# Patient Record
Sex: FEMALE | Race: WHITE | Employment: FULL TIME | URBAN - METROPOLITAN AREA
[De-identification: names, ages, dates, MRNs, and addresses within clinical notes are randomized per-mention and may not be internally consistent; named-entity substitution may affect disease eponyms.]

---

## 2018-04-03 ENCOUNTER — HOSPITAL ENCOUNTER (OUTPATIENT)
Age: 43
Discharge: HOME | End: 2018-04-03
Payer: COMMERCIAL

## 2018-04-03 ENCOUNTER — NURSE ONLY (OUTPATIENT)
Dept: OBGYN | Age: 43
End: 2018-04-03
Payer: COMMERCIAL

## 2018-04-03 VITALS
HEIGHT: 60 IN | WEIGHT: 190 LBS | DIASTOLIC BLOOD PRESSURE: 78 MMHG | HEART RATE: 97 BPM | RESPIRATION RATE: 14 BRPM | SYSTOLIC BLOOD PRESSURE: 110 MMHG | BODY MASS INDEX: 37.3 KG/M2 | TEMPERATURE: 98.8 F

## 2018-04-03 DIAGNOSIS — N93.8 DUB (DYSFUNCTIONAL UTERINE BLEEDING): Primary | ICD-10-CM

## 2018-04-03 DIAGNOSIS — N93.8 DUB (DYSFUNCTIONAL UTERINE BLEEDING): ICD-10-CM

## 2018-04-03 LAB
HCT VFR BLD CALC: 38.8 % (ref 34–48)
HEMOGLOBIN: 12.7 G/DL (ref 11.5–15.5)

## 2018-04-03 PROCEDURE — 36415 COLL VENOUS BLD VENIPUNCTURE: CPT

## 2018-04-03 PROCEDURE — 85014 HEMATOCRIT: CPT

## 2018-04-03 PROCEDURE — 6360000002 HC RX W HCPCS

## 2018-04-03 PROCEDURE — 99211 OFF/OP EST MAY X REQ PHY/QHP: CPT

## 2018-04-03 PROCEDURE — 85018 HEMOGLOBIN: CPT

## 2018-04-03 RX ORDER — MEDROXYPROGESTERONE ACETATE 150 MG/ML
150 INJECTION, SUSPENSION INTRAMUSCULAR
Status: DISCONTINUED | OUTPATIENT
Start: 2018-04-03 | End: 2021-02-11

## 2018-04-03 RX ADMIN — MEDROXYPROGESTERONE ACETATE 150 MG: 150 INJECTION, SUSPENSION INTRAMUSCULAR at 15:55

## 2018-04-11 ENCOUNTER — CLINICAL DOCUMENTATION (OUTPATIENT)
Dept: GENERAL RADIOLOGY | Age: 43
End: 2018-04-11

## 2018-04-18 ENCOUNTER — OFFICE VISIT (OUTPATIENT)
Dept: INTERNAL MEDICINE | Age: 43
End: 2018-04-18
Payer: COMMERCIAL

## 2018-04-18 VITALS
HEART RATE: 99 BPM | RESPIRATION RATE: 18 BRPM | HEIGHT: 60 IN | TEMPERATURE: 99.1 F | DIASTOLIC BLOOD PRESSURE: 78 MMHG | OXYGEN SATURATION: 96 % | SYSTOLIC BLOOD PRESSURE: 118 MMHG | WEIGHT: 190 LBS | BODY MASS INDEX: 37.3 KG/M2

## 2018-04-18 DIAGNOSIS — Z13.1 DIABETES MELLITUS SCREENING: ICD-10-CM

## 2018-04-18 DIAGNOSIS — R20.0 NUMBNESS AND TINGLING: ICD-10-CM

## 2018-04-18 DIAGNOSIS — L20.82 FLEXURAL ECZEMA: ICD-10-CM

## 2018-04-18 DIAGNOSIS — G43.109 MIGRAINE WITH AURA AND WITHOUT STATUS MIGRAINOSUS, NOT INTRACTABLE: Primary | Chronic | ICD-10-CM

## 2018-04-18 DIAGNOSIS — R20.2 NUMBNESS AND TINGLING: ICD-10-CM

## 2018-04-18 DIAGNOSIS — Z88.9 H/O SEASONAL ALLERGIES: ICD-10-CM

## 2018-04-18 PROCEDURE — 1036F TOBACCO NON-USER: CPT | Performed by: INTERNAL MEDICINE

## 2018-04-18 PROCEDURE — G8427 DOCREV CUR MEDS BY ELIG CLIN: HCPCS | Performed by: INTERNAL MEDICINE

## 2018-04-18 PROCEDURE — 99213 OFFICE O/P EST LOW 20 MIN: CPT | Performed by: INTERNAL MEDICINE

## 2018-04-18 PROCEDURE — G8417 CALC BMI ABV UP PARAM F/U: HCPCS | Performed by: INTERNAL MEDICINE

## 2018-04-18 PROCEDURE — 99212 OFFICE O/P EST SF 10 MIN: CPT | Performed by: INTERNAL MEDICINE

## 2018-04-18 RX ORDER — CETIRIZINE HYDROCHLORIDE 10 MG/1
5 TABLET ORAL DAILY
Qty: 30 TABLET | Refills: 5 | Status: SHIPPED | OUTPATIENT
Start: 2018-04-18 | End: 2019-01-25 | Stop reason: SDUPTHER

## 2018-04-18 RX ORDER — AMITRIPTYLINE HYDROCHLORIDE 150 MG/1
TABLET, FILM COATED ORAL
Qty: 30 TABLET | Refills: 5 | Status: SHIPPED | OUTPATIENT
Start: 2018-04-18 | End: 2019-01-25 | Stop reason: SDUPTHER

## 2018-04-18 RX ORDER — SUMATRIPTAN 100 MG/1
TABLET, FILM COATED ORAL
Qty: 9 TABLET | Refills: 5 | Status: SHIPPED | OUTPATIENT
Start: 2018-04-18 | End: 2019-01-25 | Stop reason: SDUPTHER

## 2018-06-07 ENCOUNTER — HOSPITAL ENCOUNTER (OUTPATIENT)
Age: 43
Discharge: HOME | End: 2018-06-07
Payer: COMMERCIAL

## 2018-06-07 ENCOUNTER — HOSPITAL ENCOUNTER (OUTPATIENT)
Dept: MRI IMAGING | Age: 43
Discharge: HOME | End: 2018-06-09
Payer: COMMERCIAL

## 2018-06-07 DIAGNOSIS — Z12.39 BREAST CANCER SCREENING, HIGH RISK PATIENT: ICD-10-CM

## 2018-06-07 DIAGNOSIS — Z91.89 AT HIGH RISK FOR BREAST CANCER: Primary | ICD-10-CM

## 2018-06-07 DIAGNOSIS — R20.0 NUMBNESS AND TINGLING: ICD-10-CM

## 2018-06-07 DIAGNOSIS — Z13.1 DIABETES MELLITUS SCREENING: ICD-10-CM

## 2018-06-07 DIAGNOSIS — R20.2 NUMBNESS AND TINGLING: ICD-10-CM

## 2018-06-07 LAB
FOLATE: >20 NG/ML (ref 4.8–24.2)
HBA1C MFR BLD: 5.4 % (ref 4.8–5.9)
VITAMIN B-12: 479 PG/ML (ref 211–946)

## 2018-06-07 PROCEDURE — 82607 VITAMIN B-12: CPT

## 2018-06-07 PROCEDURE — 82746 ASSAY OF FOLIC ACID SERUM: CPT

## 2018-06-07 PROCEDURE — A9577 INJ MULTIHANCE: HCPCS | Performed by: RADIOLOGY

## 2018-06-07 PROCEDURE — 83036 HEMOGLOBIN GLYCOSYLATED A1C: CPT

## 2018-06-07 PROCEDURE — C8908 MRI W/O FOL W/CONT, BREAST,: HCPCS

## 2018-06-07 PROCEDURE — 6360000004 HC RX CONTRAST MEDICATION: Performed by: RADIOLOGY

## 2018-06-07 PROCEDURE — 36415 COLL VENOUS BLD VENIPUNCTURE: CPT

## 2018-06-07 RX ADMIN — GADOBENATE DIMEGLUMINE 20 ML: 529 INJECTION, SOLUTION INTRAVENOUS at 11:10

## 2018-06-08 ENCOUNTER — TELEPHONE (OUTPATIENT)
Dept: OBGYN | Age: 43
End: 2018-06-08

## 2018-06-14 ENCOUNTER — OFFICE VISIT (OUTPATIENT)
Dept: FAMILY MEDICINE CLINIC | Age: 43
End: 2018-06-14
Payer: COMMERCIAL

## 2018-06-14 VITALS
DIASTOLIC BLOOD PRESSURE: 70 MMHG | HEART RATE: 99 BPM | WEIGHT: 206 LBS | RESPIRATION RATE: 16 BRPM | BODY MASS INDEX: 40.44 KG/M2 | HEIGHT: 60 IN | TEMPERATURE: 98.1 F | SYSTOLIC BLOOD PRESSURE: 104 MMHG | OXYGEN SATURATION: 96 %

## 2018-06-14 DIAGNOSIS — J01.90 ACUTE SINUSITIS, RECURRENCE NOT SPECIFIED, UNSPECIFIED LOCATION: Primary | ICD-10-CM

## 2018-06-14 PROCEDURE — 1036F TOBACCO NON-USER: CPT | Performed by: PHYSICIAN ASSISTANT

## 2018-06-14 PROCEDURE — G8417 CALC BMI ABV UP PARAM F/U: HCPCS | Performed by: PHYSICIAN ASSISTANT

## 2018-06-14 PROCEDURE — 99213 OFFICE O/P EST LOW 20 MIN: CPT | Performed by: PHYSICIAN ASSISTANT

## 2018-06-14 PROCEDURE — G8427 DOCREV CUR MEDS BY ELIG CLIN: HCPCS | Performed by: PHYSICIAN ASSISTANT

## 2018-06-14 RX ORDER — AMOXICILLIN AND CLAVULANATE POTASSIUM 875; 125 MG/1; MG/1
1 TABLET, FILM COATED ORAL 2 TIMES DAILY
Qty: 20 TABLET | Refills: 0 | Status: SHIPPED | OUTPATIENT
Start: 2018-06-14 | End: 2018-06-24

## 2018-06-14 RX ORDER — ONDANSETRON 4 MG/1
4 TABLET, ORALLY DISINTEGRATING ORAL EVERY 8 HOURS PRN
Qty: 9 TABLET | Refills: 0 | Status: SHIPPED | OUTPATIENT
Start: 2018-06-14 | End: 2018-06-17

## 2018-07-03 ENCOUNTER — OFFICE VISIT (OUTPATIENT)
Dept: FAMILY MEDICINE CLINIC | Age: 43
End: 2018-07-03
Payer: COMMERCIAL

## 2018-07-03 VITALS
TEMPERATURE: 99.4 F | HEART RATE: 104 BPM | OXYGEN SATURATION: 98 % | RESPIRATION RATE: 15 BRPM | WEIGHT: 204 LBS | SYSTOLIC BLOOD PRESSURE: 120 MMHG | HEIGHT: 60 IN | DIASTOLIC BLOOD PRESSURE: 82 MMHG | BODY MASS INDEX: 40.05 KG/M2

## 2018-07-03 DIAGNOSIS — Z02.89 ENCOUNTER TO OBTAIN EXCUSE FROM WORK: Primary | ICD-10-CM

## 2018-07-03 DIAGNOSIS — J01.41 ACUTE RECURRENT PANSINUSITIS: ICD-10-CM

## 2018-07-03 PROCEDURE — 99213 OFFICE O/P EST LOW 20 MIN: CPT | Performed by: PHYSICIAN ASSISTANT

## 2018-07-03 PROCEDURE — G8417 CALC BMI ABV UP PARAM F/U: HCPCS | Performed by: PHYSICIAN ASSISTANT

## 2018-07-03 PROCEDURE — 1036F TOBACCO NON-USER: CPT | Performed by: PHYSICIAN ASSISTANT

## 2018-07-03 PROCEDURE — G8428 CUR MEDS NOT DOCUMENT: HCPCS | Performed by: PHYSICIAN ASSISTANT

## 2018-07-03 RX ORDER — AMOXICILLIN AND CLAVULANATE POTASSIUM 875; 125 MG/1; MG/1
1 TABLET, FILM COATED ORAL 2 TIMES DAILY
Qty: 20 TABLET | Refills: 0 | Status: SHIPPED | OUTPATIENT
Start: 2018-07-03 | End: 2018-07-13

## 2018-07-03 RX ORDER — BROMPHENIRAMINE MALEATE, PSEUDOEPHEDRINE HYDROCHLORIDE, AND DEXTROMETHORPHAN HYDROBROMIDE 2; 30; 10 MG/5ML; MG/5ML; MG/5ML
10 SYRUP ORAL 4 TIMES DAILY PRN
Qty: 120 ML | Refills: 0 | COMMUNITY
Start: 2018-07-03 | End: 2021-02-11 | Stop reason: ALTCHOICE

## 2018-08-24 ENCOUNTER — HOSPITAL ENCOUNTER (OUTPATIENT)
Dept: GENERAL RADIOLOGY | Age: 43
Discharge: HOME | End: 2018-08-26
Payer: COMMERCIAL

## 2018-08-24 DIAGNOSIS — Z91.89 AT HIGH RISK FOR BREAST CANCER: ICD-10-CM

## 2018-08-24 PROCEDURE — 77063 BREAST TOMOSYNTHESIS BI: CPT

## 2018-08-25 DIAGNOSIS — Z91.89 AT HIGH RISK FOR BREAST CANCER: Primary | ICD-10-CM

## 2018-09-19 ENCOUNTER — TELEPHONE (OUTPATIENT)
Dept: OBGYN | Age: 43
End: 2018-09-19

## 2018-10-16 ENCOUNTER — TELEPHONE (OUTPATIENT)
Dept: PEDIATRICS | Age: 43
End: 2018-10-16

## 2018-10-16 DIAGNOSIS — L20.82 FLEXURAL ECZEMA: ICD-10-CM

## 2019-01-07 ENCOUNTER — APPOINTMENT (OUTPATIENT)
Dept: CT IMAGING | Age: 44
End: 2019-01-07
Payer: COMMERCIAL

## 2019-01-07 ENCOUNTER — HOSPITAL ENCOUNTER (EMERGENCY)
Age: 44
Discharge: HOME | End: 2019-01-07
Payer: COMMERCIAL

## 2019-01-07 VITALS
HEART RATE: 74 BPM | BODY MASS INDEX: 37.3 KG/M2 | WEIGHT: 190 LBS | SYSTOLIC BLOOD PRESSURE: 102 MMHG | OXYGEN SATURATION: 98 % | HEIGHT: 60 IN | TEMPERATURE: 98.3 F | RESPIRATION RATE: 16 BRPM | DIASTOLIC BLOOD PRESSURE: 68 MMHG

## 2019-01-07 DIAGNOSIS — Z91.89 AT HIGH RISK FOR BREAST CANCER: ICD-10-CM
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
## 2019-01-07 PROCEDURE — 72125 CT NECK SPINE W/O DYE: CPT

## 2019-01-07 PROCEDURE — 70450 CT HEAD/BRAIN W/O DYE: CPT

## 2019-01-07 PROCEDURE — 99284 EMERGENCY DEPT VISIT MOD MDM: CPT

## 2019-01-07 RX ORDER — TRAMADOL HYDROCHLORIDE 50 MG/1
50 TABLET ORAL EVERY 6 HOURS PRN
Qty: 20 TABLET | Refills: 0 | Status: SHIPPED | OUTPATIENT
Start: 2019-01-07 | End: 2019-01-07

## 2019-01-07 RX ORDER — TRAMADOL HYDROCHLORIDE 50 MG/1
50 TABLET ORAL EVERY 6 HOURS PRN
Qty: 20 TABLET | Refills: 0 | Status: SHIPPED | OUTPATIENT
Start: 2019-01-07 | End: 2019-01-12

## 2019-01-07 ASSESSMENT — PAIN DESCRIPTION - PROGRESSION: CLINICAL_PROGRESSION: GRADUALLY WORSENING

## 2019-01-07 ASSESSMENT — PAIN SCALES - GENERAL: PAINLEVEL_OUTOF10: 8

## 2019-01-07 ASSESSMENT — PAIN DESCRIPTION - LOCATION: LOCATION: NECK;SHOULDER

## 2019-01-07 ASSESSMENT — PAIN DESCRIPTION - FREQUENCY: FREQUENCY: CONTINUOUS

## 2019-01-07 ASSESSMENT — PAIN DESCRIPTION - PAIN TYPE: TYPE: ACUTE PAIN

## 2019-01-07 ASSESSMENT — PAIN DESCRIPTION - DESCRIPTORS: DESCRIPTORS: ACHING

## 2019-01-25 ENCOUNTER — OFFICE VISIT (OUTPATIENT)
Dept: INTERNAL MEDICINE | Age: 44
End: 2019-01-25
Payer: COMMERCIAL

## 2019-01-25 VITALS
HEART RATE: 91 BPM | DIASTOLIC BLOOD PRESSURE: 68 MMHG | SYSTOLIC BLOOD PRESSURE: 98 MMHG | WEIGHT: 190 LBS | HEIGHT: 60 IN | BODY MASS INDEX: 37.3 KG/M2 | RESPIRATION RATE: 16 BRPM | TEMPERATURE: 97.7 F

## 2019-01-25 DIAGNOSIS — G43.109 MIGRAINE WITH AURA AND WITHOUT STATUS MIGRAINOSUS, NOT INTRACTABLE: Chronic | ICD-10-CM

## 2019-01-25 DIAGNOSIS — L20.82 FLEXURAL ECZEMA: ICD-10-CM

## 2019-01-25 DIAGNOSIS — Z88.9 H/O SEASONAL ALLERGIES: ICD-10-CM

## 2019-01-25 PROCEDURE — 99212 OFFICE O/P EST SF 10 MIN: CPT | Performed by: INTERNAL MEDICINE

## 2019-01-25 PROCEDURE — 99213 OFFICE O/P EST LOW 20 MIN: CPT | Performed by: INTERNAL MEDICINE

## 2019-01-25 RX ORDER — AMITRIPTYLINE HYDROCHLORIDE 150 MG/1
TABLET, FILM COATED ORAL
Qty: 90 TABLET | Refills: 0 | Status: SHIPPED | OUTPATIENT
Start: 2019-01-25 | End: 2019-05-06 | Stop reason: SDUPTHER

## 2019-01-25 RX ORDER — SUMATRIPTAN 100 MG/1
TABLET, FILM COATED ORAL
Qty: 9 TABLET | Refills: 2 | Status: SHIPPED | OUTPATIENT
Start: 2019-01-25 | End: 2019-01-25 | Stop reason: SDUPTHER

## 2019-01-25 RX ORDER — AMITRIPTYLINE HYDROCHLORIDE 150 MG/1
TABLET, FILM COATED ORAL
Qty: 30 TABLET | Refills: 2 | Status: SHIPPED | OUTPATIENT
Start: 2019-01-25 | End: 2019-01-25 | Stop reason: SDUPTHER

## 2019-01-25 RX ORDER — AMITRIPTYLINE HYDROCHLORIDE 150 MG/1
TABLET, FILM COATED ORAL
Qty: 150 TABLET | Refills: 0 | Status: SHIPPED | OUTPATIENT
Start: 2019-01-25 | End: 2019-05-06 | Stop reason: SDUPTHER

## 2019-01-25 RX ORDER — CETIRIZINE HYDROCHLORIDE 10 MG/1
5 TABLET ORAL DAILY
Qty: 30 TABLET | Refills: 2 | Status: SHIPPED | OUTPATIENT
Start: 2019-01-25 | End: 2019-05-06 | Stop reason: SDUPTHER

## 2019-01-25 RX ORDER — SUMATRIPTAN 100 MG/1
TABLET, FILM COATED ORAL
Qty: 27 TABLET | Refills: 0 | Status: SHIPPED | OUTPATIENT
Start: 2019-01-25 | End: 2019-05-06 | Stop reason: SDUPTHER

## 2019-01-25 ASSESSMENT — PATIENT HEALTH QUESTIONNAIRE - PHQ9
SUM OF ALL RESPONSES TO PHQ QUESTIONS 1-9: 0
1. LITTLE INTEREST OR PLEASURE IN DOING THINGS: 0
SUM OF ALL RESPONSES TO PHQ9 QUESTIONS 1 & 2: 0
SUM OF ALL RESPONSES TO PHQ QUESTIONS 1-9: 0
2. FEELING DOWN, DEPRESSED OR HOPELESS: 0

## 2019-02-07 ENCOUNTER — TELEPHONE (OUTPATIENT)
Dept: INTERNAL MEDICINE | Age: 44
End: 2019-02-07

## 2019-04-16 ENCOUNTER — APPOINTMENT (OUTPATIENT)
Dept: CT IMAGING | Age: 44
End: 2019-04-16

## 2019-04-16 ENCOUNTER — HOSPITAL ENCOUNTER (EMERGENCY)
Age: 44
Discharge: HOME | End: 2019-04-16
Attending: EMERGENCY MEDICINE

## 2019-04-16 VITALS
HEART RATE: 77 BPM | OXYGEN SATURATION: 99 % | RESPIRATION RATE: 14 BRPM | HEIGHT: 60 IN | WEIGHT: 190 LBS | DIASTOLIC BLOOD PRESSURE: 73 MMHG | SYSTOLIC BLOOD PRESSURE: 116 MMHG | TEMPERATURE: 99 F | BODY MASS INDEX: 37.3 KG/M2

## 2019-04-16 DIAGNOSIS — K59.00 CONSTIPATION, UNSPECIFIED CONSTIPATION TYPE: ICD-10-CM

## 2019-04-16 DIAGNOSIS — K56.41 FECAL IMPACTION IN RECTUM (HCC): Primary | ICD-10-CM

## 2019-04-16 LAB
ALBUMIN SERPL-MCNC: 4.2 G/DL (ref 3.5–5.2)
ALP BLD-CCNC: 74 U/L (ref 35–104)
ALT SERPL-CCNC: 22 U/L (ref 0–32)
ANION GAP SERPL CALCULATED.3IONS-SCNC: 11 MMOL/L (ref 7–16)
AST SERPL-CCNC: 46 U/L (ref 0–31)
BASOPHILS ABSOLUTE: 0.04 E9/L (ref 0–0.2)
BASOPHILS RELATIVE PERCENT: 0.4 % (ref 0–2)
BILIRUB SERPL-MCNC: 0.4 MG/DL (ref 0–1.2)
BUN BLDV-MCNC: 6 MG/DL (ref 6–20)
CALCIUM SERPL-MCNC: 9 MG/DL (ref 8.6–10.2)
CHLORIDE BLD-SCNC: 105 MMOL/L (ref 98–107)
CO2: 24 MMOL/L (ref 22–29)
CREAT SERPL-MCNC: 0.7 MG/DL (ref 0.5–1)
EOSINOPHILS ABSOLUTE: 0.09 E9/L (ref 0.05–0.5)
EOSINOPHILS RELATIVE PERCENT: 0.9 % (ref 0–6)
GFR AFRICAN AMERICAN: >60
GFR NON-AFRICAN AMERICAN: >60 ML/MIN/1.73
GLUCOSE BLD-MCNC: 101 MG/DL (ref 74–99)
HCG QUALITATIVE: NEGATIVE
HCT VFR BLD CALC: 41.4 % (ref 34–48)
HEMOGLOBIN: 13.6 G/DL (ref 11.5–15.5)
IMMATURE GRANULOCYTES #: 0.05 E9/L
IMMATURE GRANULOCYTES %: 0.5 % (ref 0–5)
LACTIC ACID, SEPSIS: 1.5 MMOL/L (ref 0.5–1.9)
LYMPHOCYTES ABSOLUTE: 1.14 E9/L (ref 1.5–4)
LYMPHOCYTES RELATIVE PERCENT: 11.3 % (ref 20–42)
MCH RBC QN AUTO: 28 PG (ref 26–35)
MCHC RBC AUTO-ENTMCNC: 32.9 % (ref 32–34.5)
MCV RBC AUTO: 85.4 FL (ref 80–99.9)
MONOCYTES ABSOLUTE: 0.64 E9/L (ref 0.1–0.95)
MONOCYTES RELATIVE PERCENT: 6.3 % (ref 2–12)
NEUTROPHILS ABSOLUTE: 8.12 E9/L (ref 1.8–7.3)
NEUTROPHILS RELATIVE PERCENT: 80.6 % (ref 43–80)
PDW BLD-RTO: 14.4 FL (ref 11.5–15)
PLATELET # BLD: 360 E9/L (ref 130–450)
PMV BLD AUTO: 10.8 FL (ref 7–12)
POTASSIUM SERPL-SCNC: 4.5 MMOL/L (ref 3.5–5)
RBC # BLD: 4.85 E12/L (ref 3.5–5.5)
SODIUM BLD-SCNC: 140 MMOL/L (ref 132–146)
TOTAL PROTEIN: 7.8 G/DL (ref 6.4–8.3)
WBC # BLD: 10.1 E9/L (ref 4.5–11.5)

## 2019-04-16 PROCEDURE — 99283 EMERGENCY DEPT VISIT LOW MDM: CPT

## 2019-04-16 PROCEDURE — 80053 COMPREHEN METABOLIC PANEL: CPT

## 2019-04-16 PROCEDURE — 6360000002 HC RX W HCPCS: Performed by: EMERGENCY MEDICINE

## 2019-04-16 PROCEDURE — 96375 TX/PRO/DX INJ NEW DRUG ADDON: CPT

## 2019-04-16 PROCEDURE — 85025 COMPLETE CBC W/AUTO DIFF WBC: CPT

## 2019-04-16 PROCEDURE — 96374 THER/PROPH/DIAG INJ IV PUSH: CPT

## 2019-04-16 PROCEDURE — 2580000003 HC RX 258: Performed by: EMERGENCY MEDICINE

## 2019-04-16 PROCEDURE — 83605 ASSAY OF LACTIC ACID: CPT

## 2019-04-16 PROCEDURE — 36415 COLL VENOUS BLD VENIPUNCTURE: CPT

## 2019-04-16 PROCEDURE — 74176 CT ABD & PELVIS W/O CONTRAST: CPT

## 2019-04-16 PROCEDURE — 84703 CHORIONIC GONADOTROPIN ASSAY: CPT

## 2019-04-16 RX ORDER — METRONIDAZOLE 500 MG/1
500 TABLET ORAL 3 TIMES DAILY
Qty: 30 TABLET | Refills: 0 | Status: SHIPPED | OUTPATIENT
Start: 2019-04-16 | End: 2019-04-26

## 2019-04-16 RX ORDER — CIPROFLOXACIN 500 MG/1
500 TABLET, FILM COATED ORAL 2 TIMES DAILY
Qty: 20 TABLET | Refills: 0 | Status: SHIPPED | OUTPATIENT
Start: 2019-04-16 | End: 2019-04-26

## 2019-04-16 RX ORDER — 0.9 % SODIUM CHLORIDE 0.9 %
1000 INTRAVENOUS SOLUTION INTRAVENOUS ONCE
Status: COMPLETED | OUTPATIENT
Start: 2019-04-16 | End: 2019-04-16

## 2019-04-16 RX ORDER — MORPHINE SULFATE 4 MG/ML
6 INJECTION, SOLUTION INTRAMUSCULAR; INTRAVENOUS ONCE
Status: COMPLETED | OUTPATIENT
Start: 2019-04-16 | End: 2019-04-16

## 2019-04-16 RX ORDER — ONDANSETRON 2 MG/ML
4 INJECTION INTRAMUSCULAR; INTRAVENOUS ONCE
Status: COMPLETED | OUTPATIENT
Start: 2019-04-16 | End: 2019-04-16

## 2019-04-16 RX ADMIN — SODIUM CHLORIDE 1000 ML: 9 INJECTION, SOLUTION INTRAVENOUS at 08:51

## 2019-04-16 RX ADMIN — MORPHINE SULFATE 6 MG: 4 INJECTION INTRAVENOUS at 08:51

## 2019-04-16 RX ADMIN — ONDANSETRON 4 MG: 2 INJECTION INTRAMUSCULAR; INTRAVENOUS at 08:51

## 2019-04-16 ASSESSMENT — PAIN DESCRIPTION - DESCRIPTORS: DESCRIPTORS: ACHING

## 2019-04-16 ASSESSMENT — PAIN DESCRIPTION - LOCATION: LOCATION: ABDOMEN

## 2019-04-16 ASSESSMENT — PAIN DESCRIPTION - FREQUENCY: FREQUENCY: CONTINUOUS

## 2019-04-16 ASSESSMENT — PAIN DESCRIPTION - PAIN TYPE: TYPE: ACUTE PAIN

## 2019-04-16 ASSESSMENT — PAIN SCALES - GENERAL
PAINLEVEL_OUTOF10: 9
PAINLEVEL_OUTOF10: 9

## 2019-04-16 ASSESSMENT — PAIN DESCRIPTION - PROGRESSION: CLINICAL_PROGRESSION: GRADUALLY WORSENING

## 2019-04-16 NOTE — LETTER
[address] [facility] Emergency Department  [facility] [place] [address]  Phone: [phone]               April 16, 2019    Patient: Matthew Francisco   YOB: 1975   Date of Visit: 4/16/2019       To Whom It May Concern:    Santi Altamirano was seen and treated in our emergency department on 4/16/2019. She may return to work on 4/18/2019.       Sincerely,       Louann Hsieh RN         Signature:__________________________________

## 2019-04-16 NOTE — ED PROVIDER NOTES
and sister. The patients home medications have been reviewed. Allergies: Latex; Iodine; Trazodone and nefazodone; Bactrim; Iodides; Aspirin; Tylenol with codeine [acetaminophen-codeine]; Ultram [tramadol hcl]; and Vicodin tuss [hydrocodone-guaifenesin]        ---------------------------------------------------PHYSICAL EXAM--------------------------------------    Constitutional/General: Alert and oriented x3  Head: Normocephalic and atraumatic  Eyes: PERRL, EOMI, conjunctiva normal, sclera non icteric  Mouth: Oropharynx clear, handling secretions, no trismus, no asymmetry of the posterior oropharynx or uvular edema  Neck: Supple, full ROM, no stridor, no meningeal signs  Respiratory: Lungs clear to auscultation bilaterally, no wheezes, rales, or rhonchi. Not in respiratory distress  Cardiovascular:  Regular rate. Regular rhythm. No murmurs, no aortic murmurs, no gallops, or rubs. 2+ distal pulses. Equal extremity pulses. Chest: No chest wall tenderness  GI:  Abdomen Soft, Non tender, Non distended. +BS. No rebound, guarding, or rigidity. No pulsatile masses. Musculoskeletal: Moves all extremities x 4. Warm and well perfused, no clubbing, cyanosis, or edema. Capillary refill <3 seconds  Integument: skin warm and dry. No rashes. Neurologic: GCS 15, no focal deficits, symmetric strength 5/5 in the upper and lower extremities bilaterally  Psychiatric: Normal Affect  Rectal: Female chaperone present Bryan Osborn RN, external exam shows no external lesions or skin tears or anal fissures. No external hemorrhoids. Digital exam has mild tenderness and positive stool in the rectal vault as well as what feels like an internal hemorrhoid at 9:00 position. + fecal impaction.  Stool is brown and is trace guaiac positive but there is no gross blood or grossly bloody stool.        -------------------------------------------------- RESULTS -------------------------------------------------  I have personally reviewed retention and distention involving the   distal sigmoid/proximal rectum. Area measures approximately 8.0 cm   anterior posterior by 7.7 cm transverse by approximately 11 cm   craniocaudal with stranding noted. Findings are concerning for fecal   impaction and the possibility of developing stercoral colitis should   be considered. 2. Mild/moderate cardiomegaly with a marked to large sized hiatal   hernia. 3. Mass effect and compression upon the uterus secondary to the fecal   retention/impaction. Bladder is prominently distended. ------------------------- NURSING NOTES AND VITALS REVIEWED ---------------------------   The nursing notes within the ED encounter and vital signs as below have been reviewed by myself. /73   Pulse 77   Temp 99 °F (37.2 °C) (Oral)   Resp 14   Ht 5' (1.524 m)   Wt 190 lb (86.2 kg)   LMP 03/30/2019   SpO2 99%   Breastfeeding? No   BMI 37.11 kg/m²   Oxygen Saturation Interpretation: Normal    The patients available past medical records and past encounters were reviewed. ------------------------------ ED COURSE/MEDICAL DECISION MAKING----------------------  Medications   0.9 % sodium chloride bolus (0 mLs Intravenous Stopped 4/16/19 0900)   morphine (PF) injection 6 mg (6 mg Intravenous Given 4/16/19 0851)   ondansetron (ZOFRAN) injection 4 mg (4 mg Intravenous Given 4/16/19 0851)              Medical Decision Making:    Fecal impaction. Enema, antibiotics, home. No further bleeding      Re-Evaluations:                Improving  She is feeling better      This patient's ED course included: a personal history and physicial examination, re-evaluation prior to disposition, IV medications and complex medical decision making and emergency management    This patient has remained hemodynamically stable during their ED course. Counseling:    The emergency provider has spoken with the patient and discussed todays results, in addition to providing specific details for the plan of care and counseling regarding the diagnosis and prognosis. Questions are answered at this time and they are agreeable with the plan.       --------------------------------- IMPRESSION AND DISPOSITION ---------------------------------    IMPRESSION  1. Fecal impaction in rectum ([facility]    2. Constipation, unspecified constipation type        DISPOSITION  Disposition: Discharge to home  Patient condition is good        NOTE: This report was transcribed using voice recognition software.  Every effort was made to ensure accuracy; however, inadvertent computerized transcription errors may be present        Guillermina Rodney MD  04/16/19 8562

## 2019-04-28 ENCOUNTER — HOSPITAL ENCOUNTER (EMERGENCY)
Age: 44
Discharge: LEFT BEFORE DISCHARGE | End: 2019-04-28
Attending: EMERGENCY MEDICINE

## 2019-04-28 ENCOUNTER — APPOINTMENT (OUTPATIENT)
Dept: CT IMAGING | Age: 44
End: 2019-04-28

## 2019-04-28 ENCOUNTER — APPOINTMENT (OUTPATIENT)
Dept: GENERAL RADIOLOGY | Age: 44
End: 2019-04-28

## 2019-04-28 ENCOUNTER — HOSPITAL ENCOUNTER (EMERGENCY)
Age: 44
Discharge: HOME | End: 2019-04-28
Attending: EMERGENCY MEDICINE

## 2019-04-28 VITALS
HEART RATE: 99 BPM | SYSTOLIC BLOOD PRESSURE: 116 MMHG | HEIGHT: 60 IN | OXYGEN SATURATION: 98 % | DIASTOLIC BLOOD PRESSURE: 81 MMHG | RESPIRATION RATE: 14 BRPM | TEMPERATURE: 97.3 F | BODY MASS INDEX: 39.27 KG/M2 | WEIGHT: 200 LBS

## 2019-04-28 VITALS
SYSTOLIC BLOOD PRESSURE: 124 MMHG | WEIGHT: 200 LBS | HEIGHT: 60 IN | RESPIRATION RATE: 16 BRPM | OXYGEN SATURATION: 98 % | HEART RATE: 99 BPM | BODY MASS INDEX: 39.27 KG/M2 | TEMPERATURE: 98.2 F | DIASTOLIC BLOOD PRESSURE: 82 MMHG

## 2019-04-28 DIAGNOSIS — K62.89 PROCTITIS: ICD-10-CM

## 2019-04-28 DIAGNOSIS — R33.9 URINARY RETENTION: ICD-10-CM

## 2019-04-28 DIAGNOSIS — K59.00 CONSTIPATION, UNSPECIFIED CONSTIPATION TYPE: Primary | ICD-10-CM

## 2019-04-28 DIAGNOSIS — K56.41 FECAL IMPACTION (HCC): Primary | ICD-10-CM

## 2019-04-28 DIAGNOSIS — K59.00 CONSTIPATION, UNSPECIFIED CONSTIPATION TYPE: ICD-10-CM

## 2019-04-28 DIAGNOSIS — R10.84 GENERALIZED ABDOMINAL PAIN: ICD-10-CM

## 2019-04-28 LAB
ALBUMIN SERPL-MCNC: 4.5 G/DL (ref 3.5–5.2)
ALP BLD-CCNC: 88 U/L (ref 35–104)
ALT SERPL-CCNC: 20 U/L (ref 0–32)
ANION GAP SERPL CALCULATED.3IONS-SCNC: 12 MMOL/L (ref 7–16)
AST SERPL-CCNC: 20 U/L (ref 0–31)
BACTERIA: ABNORMAL /HPF
BASOPHILS ABSOLUTE: 0.05 E9/L (ref 0–0.2)
BASOPHILS RELATIVE PERCENT: 0.4 % (ref 0–2)
BILIRUB SERPL-MCNC: 1.7 MG/DL (ref 0–1.2)
BILIRUBIN URINE: NEGATIVE
BLOOD, URINE: NEGATIVE
BUN BLDV-MCNC: 5 MG/DL (ref 6–20)
CALCIUM SERPL-MCNC: 9.4 MG/DL (ref 8.6–10.2)
CHLORIDE BLD-SCNC: 105 MMOL/L (ref 98–107)
CLARITY: CLEAR
CO2: 26 MMOL/L (ref 22–29)
COLOR: YELLOW
CREAT SERPL-MCNC: 0.8 MG/DL (ref 0.5–1)
EOSINOPHILS ABSOLUTE: 0.08 E9/L (ref 0.05–0.5)
EOSINOPHILS RELATIVE PERCENT: 0.6 % (ref 0–6)
GFR AFRICAN AMERICAN: >60
GFR NON-AFRICAN AMERICAN: >60 ML/MIN/1.73
GLUCOSE BLD-MCNC: 104 MG/DL (ref 74–99)
GLUCOSE URINE: NEGATIVE MG/DL
HCT VFR BLD CALC: 42.5 % (ref 34–48)
HEMOGLOBIN: 13.8 G/DL (ref 11.5–15.5)
IMMATURE GRANULOCYTES #: 0.05 E9/L
IMMATURE GRANULOCYTES %: 0.4 % (ref 0–5)
KETONES, URINE: NEGATIVE MG/DL
LACTIC ACID: 1.2 MMOL/L (ref 0.5–2.2)
LEUKOCYTE ESTERASE, URINE: ABNORMAL
LIPASE: 22 U/L (ref 13–60)
LYMPHOCYTES ABSOLUTE: 1.91 E9/L (ref 1.5–4)
LYMPHOCYTES RELATIVE PERCENT: 15 % (ref 20–42)
MCH RBC QN AUTO: 28.2 PG (ref 26–35)
MCHC RBC AUTO-ENTMCNC: 32.5 % (ref 32–34.5)
MCV RBC AUTO: 86.7 FL (ref 80–99.9)
MONOCYTES ABSOLUTE: 0.85 E9/L (ref 0.1–0.95)
MONOCYTES RELATIVE PERCENT: 6.7 % (ref 2–12)
NEUTROPHILS ABSOLUTE: 9.78 E9/L (ref 1.8–7.3)
NEUTROPHILS RELATIVE PERCENT: 76.9 % (ref 43–80)
NITRITE, URINE: NEGATIVE
PDW BLD-RTO: 14.2 FL (ref 11.5–15)
PH UA: 5 (ref 5–9)
PLATELET # BLD: 356 E9/L (ref 130–450)
PMV BLD AUTO: 10.4 FL (ref 7–12)
POTASSIUM SERPL-SCNC: 3.9 MMOL/L (ref 3.5–5)
PROTEIN UA: NEGATIVE MG/DL
RBC # BLD: 4.9 E12/L (ref 3.5–5.5)
RBC UA: ABNORMAL /HPF (ref 0–2)
SODIUM BLD-SCNC: 143 MMOL/L (ref 132–146)
SPECIFIC GRAVITY UA: 1.01 (ref 1–1.03)
TOTAL PROTEIN: 8 G/DL (ref 6.4–8.3)
UROBILINOGEN, URINE: 0.2 E.U./DL
WBC # BLD: 12.7 E9/L (ref 4.5–11.5)
WBC UA: ABNORMAL /HPF (ref 0–5)

## 2019-04-28 PROCEDURE — 85025 COMPLETE CBC W/AUTO DIFF WBC: CPT

## 2019-04-28 PROCEDURE — 6360000002 HC RX W HCPCS: Performed by: NURSE PRACTITIONER

## 2019-04-28 PROCEDURE — 74018 RADEX ABDOMEN 1 VIEW: CPT

## 2019-04-28 PROCEDURE — 83690 ASSAY OF LIPASE: CPT

## 2019-04-28 PROCEDURE — 2580000003 HC RX 258: Performed by: NURSE PRACTITIONER

## 2019-04-28 PROCEDURE — 36415 COLL VENOUS BLD VENIPUNCTURE: CPT

## 2019-04-28 PROCEDURE — 80053 COMPREHEN METABOLIC PANEL: CPT

## 2019-04-28 PROCEDURE — 74176 CT ABD & PELVIS W/O CONTRAST: CPT

## 2019-04-28 PROCEDURE — 99284 EMERGENCY DEPT VISIT MOD MDM: CPT

## 2019-04-28 PROCEDURE — 83605 ASSAY OF LACTIC ACID: CPT

## 2019-04-28 PROCEDURE — 96374 THER/PROPH/DIAG INJ IV PUSH: CPT

## 2019-04-28 PROCEDURE — 81001 URINALYSIS AUTO W/SCOPE: CPT

## 2019-04-28 PROCEDURE — 99283 EMERGENCY DEPT VISIT LOW MDM: CPT

## 2019-04-28 PROCEDURE — 6360000004 HC RX CONTRAST MEDICATION: Performed by: RADIOLOGY

## 2019-04-28 RX ORDER — MAGNESIUM CITRATE
150 SOLUTION, ORAL ORAL ONCE
Qty: 150 ML | Refills: 0 | Status: SHIPPED | OUTPATIENT
Start: 2019-04-28 | End: 2019-04-28

## 2019-04-28 RX ORDER — 0.9 % SODIUM CHLORIDE 0.9 %
1000 INTRAVENOUS SOLUTION INTRAVENOUS ONCE
Status: COMPLETED | OUTPATIENT
Start: 2019-04-28 | End: 2019-04-28

## 2019-04-28 RX ORDER — PROCHLORPERAZINE EDISYLATE 5 MG/ML
10 INJECTION INTRAMUSCULAR; INTRAVENOUS ONCE
Status: COMPLETED | OUTPATIENT
Start: 2019-04-28 | End: 2019-04-28

## 2019-04-28 RX ADMIN — PROCHLORPERAZINE EDISYLATE 10 MG: 5 INJECTION INTRAMUSCULAR; INTRAVENOUS at 15:20

## 2019-04-28 RX ADMIN — SODIUM CHLORIDE 1000 ML: 9 INJECTION, SOLUTION INTRAVENOUS at 15:20

## 2019-04-28 RX ADMIN — IOHEXOL 50 ML: 240 INJECTION, SOLUTION INTRATHECAL; INTRAVASCULAR; INTRAVENOUS; ORAL at 15:20

## 2019-04-28 ASSESSMENT — ENCOUNTER SYMPTOMS
CONSTIPATION: 1
COUGH: 0
SHORTNESS OF BREATH: 0
BLOOD IN STOOL: 0
ABDOMINAL PAIN: 1
DIARRHEA: 0
VOMITING: 1
NAUSEA: 1
HEMATOCHEZIA: 0
BACK PAIN: 0
WHEEZING: 0

## 2019-04-28 ASSESSMENT — PAIN DESCRIPTION - ORIENTATION: ORIENTATION: MID;LOWER;UPPER

## 2019-04-28 ASSESSMENT — PAIN DESCRIPTION - LOCATION
LOCATION: ABDOMEN
LOCATION: ABDOMEN

## 2019-04-28 ASSESSMENT — PAIN DESCRIPTION - DESCRIPTORS: DESCRIPTORS: ACHING

## 2019-04-28 ASSESSMENT — PAIN DESCRIPTION - FREQUENCY: FREQUENCY: CONTINUOUS

## 2019-04-28 ASSESSMENT — PAIN DESCRIPTION - PAIN TYPE: TYPE: ACUTE PAIN;CHRONIC PAIN

## 2019-04-28 ASSESSMENT — PAIN DESCRIPTION - PROGRESSION: CLINICAL_PROGRESSION: NOT CHANGED

## 2019-04-28 ASSESSMENT — PAIN SCALES - GENERAL
PAINLEVEL_OUTOF10: 9
PAINLEVEL_OUTOF10: 9

## 2019-04-28 NOTE — ED NOTES
Pt requested soap suds  States \"It works better for [facility]".   States she wants to do it herself     Elisha Aguilera, RN  04/28/19 7760

## 2019-04-28 NOTE — ED NOTES
Latex free urinary catheter obtained from central supply. Went into pts room. Pt was not in room. Clothes were not in room. Gown on bed. Pt not in restrooms at this time. NP notified.       Osmany Little RN  04/28/19 1262
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
## 2019-04-28 NOTE — ED NOTES
Call placed at this time to central supply for a latex free urinary catheter     Chen Simon RN  04/28/19 2622

## 2019-04-28 NOTE — ED NOTES
Radiology Procedure Waiver   Name: Alicia Decker  : 1975  MRN: [id number]    Date:  19    Time: 4:17 PM    Benefits of immediately proceeding with Radiology exam(s) without pre-testing outweigh the risks or are not indicated as specified below and therefore the following is/are being waived:    [x] Pregnancy test   [x] Patient had a negative POC pregnancy on 19   [] Patient had Tubal Ligation or has other Contraception Device. [] Patient  is Menopausal or Premenarcheal.    [] Patient had Full or Partial Hysterectomy. [] Protocol for Iodine allergy    [] MRI Questionnaire     [] BUN/Creatinine   [] Patient age w/no hx of renal dysfunction. [] Patient on Dialysis. [] Recent Normal Labs.   Electronically signed by NANCY Fung CNP on 19 at 4:17 PM               NANCY Fung CNP  [phone]

## 2019-04-28 NOTE — ED NOTES
Pt states she feels a little better.   Requests to be discharged    rx x 1  To follow with pmnory/ELIANE Romo RN  04/28/19 0436

## 2019-04-28 NOTE — ED NOTES
20g IV found on top of garbage in garbage can in pts room.       Virginie Russo, DULCE  04/28/19 1083

## 2019-04-28 NOTE — ED PROVIDER NOTES
atraumatic. Eyes: Pupils are equal, round, and reactive to light. Neck: Normal range of motion. Neck supple. Cardiovascular: Normal rate, regular rhythm, normal heart sounds and intact distal pulses. No murmur heard. Pulmonary/Chest: Effort normal and breath sounds normal. No respiratory distress. She has no wheezes. She has no rales. Abdominal: Soft. Bowel sounds are normal. She exhibits distension. There is no tenderness. There is no rebound and no guarding. Musculoskeletal: She exhibits no edema. Neurological: She is alert. Skin: Skin is warm and dry. Capillary refill takes less than 2 seconds. She is not diaphoretic. Nursing note and vitals reviewed. Procedures  --------------------------------------------- PAST HISTORY ---------------------------------------------  Past Medical History:  has a past medical history of Migraine, PTSD (post-traumatic stress disorder), and Sinusitis (chronic). Past Surgical History:  has a past surgical history that includes Cholecystectomy (age 24); Tonsillectomy (age 1); [place] tooth extraction; and Adenoidectomy. Social History:  reports that she has never smoked. She has never used smokeless tobacco. She reports that she does not drink alcohol or use drugs. Family History: family history includes Arthritis in her mother; Asthma in her mother; Cancer in her father, mother, and sister; Heart Disease in her mother; High Blood Pressure in her father; Migraines in her father, mother, and sister; Stroke in her mother and sister. The patients home medications have been reviewed. Allergies: Latex; Iodine; Trazodone and nefazodone; Bactrim; Iodides; Aspirin; Tylenol with codeine [acetaminophen-codeine];  Ultram [tramadol hcl]; and Vicodin tuss [hydrocodone-guaifenesin]    -------------------------------------------------- RESULTS -------------------------------------------------  Labs:  No results found for this visit on 04/28/19. Radiology:  XR ABDOMEN (KUB) (SINGLE AP VIEW)    (Results Pending)       ------------------------- NURSING NOTES AND VITALS REVIEWED ---------------------------  Date / Time Roomed:  4/28/2019 12:11 AM  ED Bed Assignment:  10/10    The nursing notes within the ED encounter and vital signs as below have been reviewed. /82   Pulse 99   Temp 98.2 °F (36.8 °C) (Oral)   Resp 16   Ht 5' (1.524 m)   Wt 200 lb (90.7 kg)   LMP 04/14/2019   SpO2 98%   BMI 39.06 kg/m²   Oxygen Saturation Interpretation: Normal      ------------------------------------------ PROGRESS NOTES ------------------------------------------         1:52 AM  I have spoken with the patient and discussed todays results, in addition to providing specific details for the plan of care and counseling regarding the diagnosis and prognosis. Their questions are answered at this time and they are agreeable with the plan. I discussed at length with them reasons for immediate return here for re evaluation. They will followup with their GI speciliast and primary care physician by calling their office tomorrow. --------------------------------- ADDITIONAL PROVIDER NOTES ---------------------------------  At this time the patient is without objective evidence of an acute process requiring hospitalization or inpatient management. They have remained hemodynamically stable throughout their entire ED visit and are stable for discharge with outpatient follow-up. The plan has been discussed in detail and they are aware of the specific conditions for emergent return, as well as the importance of follow-up. New Prescriptions    MAGNESIUM CITRATE (CITROMA) SOLN    Take 150 mLs by mouth once for 1 dose       Diagnosis:  1. Fecal impaction ([address]    2. Constipation, unspecified constipation type        Disposition:  Patient's disposition: Discharge to home  Patient's condition is stable.     [facility]    ED Course as of Apr 28 0157   [place] Apr

## 2019-04-29 ENCOUNTER — TELEPHONE (OUTPATIENT)
Dept: INTERNAL MEDICINE | Age: 44
End: 2019-04-29

## 2019-04-29 NOTE — TELEPHONE ENCOUNTER
Reached out to patient to complete Pre-Charting. Unable to reach to patient, left a voicemail for patient to call me back to proceed with [facility] flow sheet.

## 2019-05-06 ENCOUNTER — OFFICE VISIT (OUTPATIENT)
Dept: INTERNAL MEDICINE | Age: 44
End: 2019-05-06
Payer: MEDICAID

## 2019-05-06 VITALS
DIASTOLIC BLOOD PRESSURE: 77 MMHG | TEMPERATURE: 98.2 F | HEART RATE: 91 BPM | BODY MASS INDEX: 37.16 KG/M2 | SYSTOLIC BLOOD PRESSURE: 118 MMHG | HEIGHT: 60 IN | RESPIRATION RATE: 16 BRPM | WEIGHT: 189.3 LBS

## 2019-05-06 DIAGNOSIS — Z88.9 H/O SEASONAL ALLERGIES: ICD-10-CM

## 2019-05-06 DIAGNOSIS — L20.82 FLEXURAL ECZEMA: ICD-10-CM

## 2019-05-06 DIAGNOSIS — G43.109 MIGRAINE WITH AURA AND WITHOUT STATUS MIGRAINOSUS, NOT INTRACTABLE: Chronic | ICD-10-CM

## 2019-05-06 PROCEDURE — 99213 OFFICE O/P EST LOW 20 MIN: CPT | Performed by: INTERNAL MEDICINE

## 2019-05-06 PROCEDURE — 99212 OFFICE O/P EST SF 10 MIN: CPT | Performed by: INTERNAL MEDICINE

## 2019-05-06 RX ORDER — CETIRIZINE HYDROCHLORIDE 10 MG/1
10 TABLET ORAL DAILY
Qty: 30 TABLET | Refills: 2 | Status: SHIPPED | OUTPATIENT
Start: 2019-05-06 | End: 2019-07-30 | Stop reason: SDUPTHER

## 2019-05-06 RX ORDER — FLUTICASONE PROPIONATE 50 MCG
1 SPRAY, SUSPENSION (ML) NASAL DAILY
Qty: 1 BOTTLE | Refills: 3 | Status: SHIPPED | OUTPATIENT
Start: 2019-05-06 | End: 2020-11-10

## 2019-05-06 RX ORDER — SUMATRIPTAN 100 MG/1
TABLET, FILM COATED ORAL
Qty: 27 TABLET | Refills: 2 | Status: SHIPPED | OUTPATIENT
Start: 2019-05-06 | End: 2019-07-30 | Stop reason: SDUPTHER

## 2019-05-06 RX ORDER — AMITRIPTYLINE HYDROCHLORIDE 150 MG/1
TABLET, FILM COATED ORAL
Qty: 150 TABLET | Refills: 2 | Status: SHIPPED | OUTPATIENT
Start: 2019-05-06 | End: 2019-07-30 | Stop reason: SDUPTHER

## 2019-05-06 ASSESSMENT — ENCOUNTER SYMPTOMS
VOICE CHANGE: 0
ABDOMINAL PAIN: 1
COUGH: 0
NAUSEA: 0
SHORTNESS OF BREATH: 0
VOMITING: 1
SORE THROAT: 0
ABDOMINAL DISTENTION: 0

## 2019-05-06 NOTE — PROGRESS NOTES
[facility]  Internal Medicine Clinic    Attending Physician Statement  I have discussed the case, including pertinent history and exam findings with the resident. I have seen and examined the patient and the key elements of the encounter have been performed by me. I agree with the assessment, plan and orders as documented by the resident. I have reviewed all pertinent PMHx, PSHx, FamHx, SocialHx, medications, and allergies and updated history as appropriate. Patient here for routine follow up of medical problems. Constipation - continues changed diet - refractory to fiber - elavil does have side effect of constipation - would try and taper off to topamax for migraines and follow up sx - will see GS soon. Remainder of medical problems as per resident note.   Yesi Salamanca D.O.  5/6/2019 1:49 PM

## 2019-05-06 NOTE — PROGRESS NOTES
[facility]  InternalMedicine Residency Program  [facility] Note      SUBJECTIVE:    Aisha Lin presented to the [facility] for a routine visit for had concerns including 3 Month Follow-Up and Medication Refill. .     Patient is here for a follow-up appointment. She complains of persistent constipation, uses over the counter laxative as needed. Last bowel movement was a few days ago. She tends to try to defecate several times a day and reports that she feels \" raw down there\"  Patient eats a low fiber diet as revi: Diet is mostly fries, salads with lettuce and tomato, tacos, pizza and pasta. She has had several ER visits for this issue. She notes she has a reduced appetite. She notes associated abdominal pain. She notes occasional blood iin her stool, dark red and pain when defecating. She reports no unplanned weight loss. Reports no history of scopes. She reports that her her mother had colon cancer before the age of 28 which was treated with surgery and radiation, mother is currently 79. Review of Systems   Constitutional: Negative for activity change, fatigue, fever and unexpected weight change. HENT: Negative for congestion, sore throat and voice change. Respiratory: Negative for cough and shortness of breath. Cardiovascular: Negative for chest pain and leg swelling. Gastrointestinal: Positive for abdominal pain and vomiting. Negative for abdominal distention and nausea. Genitourinary: Positive for difficulty urinating. Negative for dysuria, flank pain, frequency, genital sores, pelvic pain, urgency, vaginal bleeding, vaginal discharge and vaginal pain. Neurological: Positive for headaches. Negative for dizziness, seizures, speech difficulty, light-headedness and numbness. Psychiatric/Behavioral: The patient is not nervous/anxious.         Current Outpatient Medications on File Prior to Visit   Medication Sig Dispense Refill    Psyllium (METAMUCIL) 28.3 % POWD Take by mouth Takes 1 teaspoon daily  otc med      cetirizine (ZYRTEC) 10 MG tablet Take 0.5 tablets by mouth daily 30 tablet 2    mineral oil-hydrophilic petrolatum (AQUAPHOR) ointment APPLY TOPICALLY AS NEEDED 52.5 g 2    hydrocortisone 2.5 % cream USE AS DIRECTED PER DR DIXON 30 g 2    SUMAtriptan (IMITREX) 100 MG tablet Take one tablet  prn headache. May repeat in 2 hours. No more than 200 mg in 24 hours. Do not treat greater than 4 headaches in 30 days. 27 tablet 0    amitriptyline (ELAVIL) 150 MG tablet TAKE ONE TABLET BY MOUTH NIGHTLY 150 tablet 0    amitriptyline (ELAVIL) 150 MG tablet TAKE ONE TABLET BY MOUTH NIGHTLY 90 tablet 0    brompheniramine-pseudoephedrine-DM 30-2-10 MG/5ML syrup Take 10 mLs by mouth 4 times daily as needed for Congestion or Cough 120 mL 0    Ascorbic Acid (VITAMIN C) 500 MG tablet Take 500 mg by mouth daily      VITAMIN A PO Take 1 tablet by mouth daily      vitamin B-12 (CYANOCOBALAMIN) 100 MCG tablet Take 50 mcg by mouth daily      Biotin 1 MG CAPS Take 1 tablet by mouth daily      vitamin D3 (CHOLECALCIFEROL) 400 UNITS TABS tablet Take 400 Units by mouth daily      ferrous sulfate 325 (65 FE) MG tablet Take 650 mg by mouth daily (with breakfast)      Multiple Vitamins-Minerals (MULTIVITAMIN GUMMIES ADULT PO) Take 1 tablet by mouth daily      fluticasone (FLONASE) 50 MCG/ACT nasal spray 1 spray by Nasal route daily 1 Bottle 3    Misc. Devices MISC Please provide braces for carpal tunnel syndrome, bilateral. 2 Device 0    melatonin 3 MG TABS tablet Take 3 mg by mouth daily      DiphenhydrAMINE HCl (BENADRYL ALLERGY PO) Take  by mouth as needed.        Current Facility-Administered Medications on File Prior to Visit   Medication Dose Route Frequency Provider Last Rate Last Dose    medroxyPROGESTERone (DEPO-PROVERA) injection 150 mg  150 mg Intramuscular Q3 Months NANCY DuarteM   150 mg at 04/03/18 1555       OBJECTIVE:    VS: /77 (Site: Left Upper Arm, Position: Sitting, Cuff Size: Medium Adult)   Pulse 91   Temp 98.2 °F (36.8 °C) (Oral)   Resp 16   Ht 5' (1.524 m)   Wt 189 lb 4.8 oz (85.9 kg)   LMP 04/14/2019   BMI 36.97 kg/m²   Physical Exam   Constitutional: She is oriented to person, place, and time. She appears well-developed and well-nourished. HENT:   Head: Normocephalic and atraumatic. Eyes: Pupils are equal, round, and reactive to light. Conjunctivae and EOM are normal.   Neck: Normal range of motion. Cardiovascular: Normal rate, regular rhythm and normal heart sounds. Pulmonary/Chest: Effort normal and breath sounds normal.   Abdominal: Soft. Bowel sounds are normal. She exhibits no mass. There is tenderness (lower quadrants). There is no rebound and no guarding. Genitourinary:   Genitourinary Comments: Patient declined a genitourinary examination    Musculoskeletal: Normal range of motion. Neurological: She is alert and oriented to person, place, and time. Skin: Skin is warm. Capillary refill takes less than 2 seconds. ASSESSMENT/PLAN:    Constipation  -Advised on dietary changes including increased fiber intake and more fluids  -Patient advised on trial of daily stool softener  -History of colon cancer at young age in mother as reported   -Follow up with Dr Susannah Arana as scheduled on 5/13/19  -Trial to wean off amitriptyline as it has constipation listed as side effect:consider changing to   topiramate after assessment by general surgery    History of  seasonal allergies  - cetirizine (ZYRTEC) 10 MG tablet; Take 0.5 tablets by mouth daily  Dispense: 30 tablet     Migraine with aura and without status migrainosus, not intractable  - SUMAtriptan (IMITREX) 100 MG tablet; Take one tablet  prn headache. May repeat in 2 hours. No more than 200 mg in 24 hours. Do not treat greater than 4 headaches in 30 days.   Dispense: 9 tablet  - amitriptyline (ELAVIL) 150 MG tablet; TAKE ONE TABLET BY MOUTH NIGHTLY  Dispense: 30 tablet     Flexural eczema  - mineral oil-hydrophilic petrolatum (AQUAPHOR) ointment; APPLY TOPICALLY AS NEEDED        Dispense: 52.5 g  - hydrocortisone 2.5 % cream; USE AS DIRECTED PER DR DIXON  Dispense: 30 g       RTC:  Follow up with PCP in 2 months   I have reviewed my findings and recommendations with Go Mckeon and Dr Ruperto Ellsworth MD PGY-2  5/6/2019 1:51 PM

## 2019-05-13 ENCOUNTER — OFFICE VISIT (OUTPATIENT)
Dept: SURGERY | Age: 44
End: 2019-05-13
Payer: MEDICAID

## 2019-05-13 ENCOUNTER — TELEPHONE (OUTPATIENT)
Dept: SURGERY | Age: 44
End: 2019-05-13

## 2019-05-13 VITALS
DIASTOLIC BLOOD PRESSURE: 76 MMHG | HEIGHT: 60 IN | HEART RATE: 91 BPM | SYSTOLIC BLOOD PRESSURE: 116 MMHG | OXYGEN SATURATION: 98 % | BODY MASS INDEX: 37.11 KG/M2 | WEIGHT: 189 LBS

## 2019-05-13 DIAGNOSIS — Z80.0 FAMILY HX OF COLON CANCER: ICD-10-CM

## 2019-05-13 DIAGNOSIS — K59.00 CONSTIPATION, UNSPECIFIED CONSTIPATION TYPE: Primary | ICD-10-CM

## 2019-05-13 PROCEDURE — 99204 OFFICE O/P NEW MOD 45 MIN: CPT | Performed by: SURGERY

## 2019-05-13 RX ORDER — CHLORPHENIRAMINE MALEATE 4 MG/1
4 TABLET ORAL EVERY 6 HOURS PRN
COMMUNITY
End: 2019-11-12 | Stop reason: SDUPTHER

## 2019-05-13 NOTE — PATIENT INSTRUCTIONS
Call [phone] for any questions/concerns. You will need 2 days of clear liquids prior to your colonoscopy. Follow the mag citrate instructions 2 days prior to your scope. Follow the GoLytely instructions the day prior to your scope. [facility]  MAGNESIUM CITRATE/DULCOLAX TABLETS  COLON PREP FOR COLONOSCOPY OR COLON SURGERY    It is very important that you follow all of the instructions listed on this sheet carefully (they may be slightly different than the directions on the product that you purchase at the pharmacy) to ensure that you colon is adequately cleaned out or you risk of complications could be increased. 3 Days or More Before Endoscopy:   Obtain two 8 to 10-ounce bottles of Magnesium Citrate and 1 bottle of Dulcolax tablets from the pharmacy.  Do not eat corn, tomatoes, peas or watermelon 3 to 5 days before procedure.  7:00pm Take 4 Dulcolax tablets and drink at least 8oz of clear liquids. 2 Day Before the Endoscopy:   No solid food - only clear liquids (soup, jello, or juice that you can see through with no solid food) for breakfast, lunch and supper. DO NOT drink or eat anything that is red as it will turn the inside of the colon red and look like blood. Nothing to eat or drink after midnight.  Have at least 8 oz or more of clear liquids for breakfast (7am to 8am) and lunch (11:30am to 12:30pm).  1:00pm Drink at least 8oz of clear liquids.  2:00pm Drink at least 8oz of clear liquids.  3:00pm Drink at least 8oz of clear liquids.  4:00pm Drink an 8 to 10oz bottle of Magnesium Citrate followed immediately by at least 8oz of clear liquids.  5:00pm Drink at least 8oz of clear liquids.  6:00pm Dinner - all clear liquids.  7:00pm Take 4 Dulcolax tablets and drink at least 8oz of clear liquids.  8:00pm Drink at least 8oz of clear liquids.    9:00pm Drink an 8 to 10oz bottle of Magnesium Citrate followed immediately by at least 8oz of clear liquids. Day of Endoscopy:   Nothing to eat or drink after midnight the night before the endoscopy. However, if you are taking any blood pressure medications or heart medications, you should take them with a sip of water.  If you are on INSULIN or OTHER DIABETIC MEDICATIONS, then check with your primary care physician as to how to adjust your medication while on clear liquid diet and when nothing by mouth. BOWEL PREP INSTRUCTIONS      It is very important that you follow all of the instructions listed on this sheet carefully to ensure that your colon is cleaned out or your risk of side effects could be increased. What you will Need:  1. Nulytely, Golytely or Colyte Colon prep. You have been given a prescription. 2 Days or More Before Colonoscopy:   Obtain your colon prep from the pharmacy   Do not eat corn, tomatoes, peas, or watermelon 3 to 5 days before procedure. On the Day Before Colonoscopy:  · You may have clear liquids ONLY - No solid food. Do not drink milk   Do not eat or drink anything that is red or purple in color   Do not drink alcohol or beer. The following is OK to eat or drink:  ·  Water, Limeade or lemonade  ·  Strained fruit juices (no pulp) - including apple, orange, white grape or white  cranberry  ·  Coffee or Tea - do not use any milk or creamer  ·  Chicken broth  ·  Jello without added fruit or toppings (No red or purple)    Directions to take Colon Prep:  Step 1. The colon prep can be used with or without the flavor packs. If using flavor, tear open packet and pour into bottle before mixing. Step 2. Add lukewarm water to top line on bottle. Put cap on bottle and shake to dissolve the powder. It should clear like water. Do not put anything else in the bottle. After mixing, keep in the refrigerator. You should drink it within 48 hours. Step 3. The first bowel movement occurs about 1 hour after you start drinking the bowel prep.  Continue to drink the bowel prep until the bowel movement is clear like water and free of solid bowel movement  Step 4. Drink 1 (8 oz) glass every 10 minutes. Try not to sip small amounts, but instead drink each glass within a few minutes. Note:    Feelings of bloating and/or nausea are common. This is temporary and will get better once bowel movements begin.  If you have nausea or vomiting, call the doctor   Nothing to eat or drink after midnight the night before your colonoscopy. However, if you are taking any blood pressure medications or heart medications, you should take them with a sip of water.  If you are on Insulin or Other medications for diabetes then check with your doctor as to how to take your medication for the day before and the day of the colonoscopy. [facility] - Dr. Santino Bills, Dr. Papa Martin,  Dr. Mukesh Marsh, Dr. Lizet Joseph, Dr. Everette Gill, Dr. Austin Smart, Dr. Doug Snyder  Phone: [phone]  Fax:  [phone]       Patient Information and Instructions for Colonoscopy         Definition of Colonoscopy   A colonoscopy is the visual exam of the rectum and colon (large intestine). The exam is done with a tool called a colonoscope. The colonoscope is a flexible tube with a tiny camera on the end. This instrument allows the doctor to view the inside of your rectum and colon. Sigmoidoscopy is a shorter scope that views only the last one third of the colon. Reasons for Colonoscopy   It is used to examine, diagnose, and treat problems in your large intestine. The procedure is most often done for the following reasons:    To determine the cause of abdominal pain, rectal bleeding, or a change in bowel habits   To detect and treat colon cancer or colon polyps   To obtain tissue samples for testing   To stop intestinal bleeding   Monitor response to treatment if you have inflammatory bowel disease     Possible Complications   Complications are rare, but no procedure is completely free of risk. If you are planning to have a colonoscopy, your doctor will review a list of possible complications, which may include:   Bleeding   Reaction to the sedation causing drop in your blood pressure or problems breathing  Perforation or puncture of the bowel     Factors that may increase the risk of complications include:   Pre-existing heart or kidney condition   Treatment with certain medicines, including aspirin and other drugs with anticoagulant or blood-thinning properties   Prior abdominal surgery or radiation treatments   Active colitis , diverticulitis , or other acute bowel disease   Previous treatment with radiation therapy     Be sure to discuss these risks with your doctor before the procedure. What to Expect   Prior to Procedure   Your doctor will likely do the following:   Physical exam   Health history   Review of medicines   Test your stool for hidden blood (called \"occult blood\")     Your colon must be completely clean before the procedure. Any stool left in the intestine will block the view. This preparation may start several days before the procedure. Follow your doctor's instructions. Leading up to your procedure:   Talk to your doctor about your medicines. You may be asked to stop taking some medicines up to one week before the procedure, like:   Anti-inflammatory drugs (e.g., aspirin )   Blood thinners like clopidogrel (Plavix) or warfarin (Coumadin)   Iron supplements or vitamins containing iron   The day or days before your procedure, go on a clear liquid diet (clear broth, clear juice, clear jello) with no red coloring  Do not eat or drink anything after midnight. Wear comfortable clothing. If you have diabetes, ask your doctor if you need to adjust your diabetes medicine on the day prior to your procedure and the day of your procedure. Arrange for a ride home after the procedure.      Anesthesia   You will receive intravenous sedation medicine for the procedure so you will not feel anything during the procedure. Description of the Procedure   You will lie on your left side with knees bent and drawn up toward your chest. The colonoscope will be slowly inserted through the rectum and into the bowel. The colonoscope will inject air into the colon. A small attached video camera will allow the doctor to view the colon's lining on a screen. The doctor will continue guiding the tool through the bowel and assess the lining. A tissue sample or polyps may be removed during the procedure. How Long Will It Take? Usually it takes about 30 to 45 minutes     Will It Hurt? Most people do not feel anything during the procedure and will not remember the procedure. After the procedure, gas pains and cramping are common. These pains should go away with the passing of gas. Post-procedure Care   If any tissue was removed: It will be sent to a lab to be examined. It may take 1-2 weeks for results. The doctor will usually give an initial report after the scope is removed. Other tests may be recommended. A small amount of bleeding may occur during the first few days after the procedure. When you return home after the procedure, be sure to follow your doctor's instructions, which may include:   Resume medicines as instructed by your doctor. Resume normal diet, unless directed otherwise by your doctor. The sedative will make you drowsy. Avoid driving, operating machinery, or making important decisions for the rest of the day. Rest for the remainder of the day. After arriving home, contact your doctor if any of the following occurs:   Bleeding from your rectum, notify your doctor if you pass a teaspoonful of blood or more.    Black, tarry stools   Severe abdominal pain   Hard, swollen abdomen   Signs of infection, including fever or chills   Inability to pass gas or stool   Coughing, shortness of breath, chest pain, severe nausea or vomiting In case of an emergency, CALL 911 .

## 2019-05-13 NOTE — PROGRESS NOTES
GENERAL SURGERY  OFFICE VISIT  5/13/2019    Chief Complaint   Patient presents with    Constipation     New patient, ED f/u 4/28/19, fatty blockage and constipation    Constipation     states it's been occuring for a month and a half. states BMs are about once a week.  Abdominal Pain     lower/medial abdominal pain, bloating, complains of stomach stiffness    Nausea & Vomiting     states nausea occurs after eating and with constipation, causing problems with work    [facility] Other     eating more fruits and veggies, cutting back/out carbs and fried foods, no changes in bowels     Other     feels like stools are too big to pass        HPI  Ressie Dancer is a 38 y.o. female who presents for evaluation of chronic constipation, feels stools are too large to pass. Has had multiple ED visits, and has required disimpaction. States typically has one BM per week. Does not take regular bowel regimen at home. Last bowel movement was this morning. Notes occasional blood in her stool, weight loss of approximately 15-20 lbs over past few months. Denies any fevers, chills, nausea, vomiting, abdominal pain currently. Has extensive family history of multiple types of cancer - including Colon Cancer in her mother at age 28, required surgery and chemo-rads; colon cancer is multiple other family members; gastric cancer; brain cancer; prostate cancer; can't not remember the rest.    Never had colonoscopy or EGD. Past Medical History:   Diagnosis Date    Migraine 20 years    Sinusitis (chronic)        Past Surgical History:   Procedure Laterality Date    ADENOIDECTOMY      CHOLECYSTECTOMY  age 24   [facility] TONSILLECTOMY  age 1    WISDOM TOOTH EXTRACTION         Prior to Admission medications    Medication Sig Start Date End Date Taking?  Authorizing Provider   chlorpheniramine (EQ CHLORTABS) 4 MG tablet Take 4 mg by mouth every 6 hours as needed for Allergies   Yes Historical Provider, MD   Psyllium (METAMUCIL) 28.3 % POWD Takes 1 teaspoon daily  otc med 5/6/19  Yes Sahra Hickey MD   mineral oil-hydrophilic petrolatum (AQUAPHOR) ointment APPLY TOPICALLY AS NEEDED 5/6/19  Yes Sahra Hickey MD   hydrocortisone 2.5 % cream Apply to affected area 5/6/19  Yes Sahra Hickey MD   SUMAtriptan (IMITREX) 100 MG tablet Take one tablet  prn headache. May repeat in 2 hours. No more than 200 mg in 24 hours. Do not treat greater than 4 headaches in 30 days. 5/6/19  Yes Sahra Hickey MD   amitriptyline (ELAVIL) 150 MG tablet TAKE ONE TABLET BY MOUTH NIGHTLY 5/6/19  Yes Destiney Delaney MD   Ascorbic Acid (VITAMIN C) 500 MG tablet Take 500 mg by mouth daily   Yes Historical Provider, MD   vitamin B-12 (CYANOCOBALAMIN) 100 MCG tablet Take 50 mcg by mouth daily   Yes Historical Provider, MD   Biotin 1 MG CAPS Take 1 tablet by mouth daily   Yes Historical Provider, MD   vitamin D3 (CHOLECALCIFEROL) 400 UNITS TABS tablet Take 400 Units by mouth daily   Yes Historical Provider, MD   ferrous sulfate 325 (65 FE) MG tablet Take 650 mg by mouth daily (with breakfast)   Yes Historical Provider, MD   Multiple Vitamins-Minerals (MULTIVITAMIN GUMMIES ADULT PO) Take 1 tablet by mouth daily   Yes Historical Provider, MD   Misc. Devices MISC Please provide braces for carpal tunnel syndrome, bilateral. 9/21/16  Yes Elias Briscoe MD   melatonin 3 MG TABS tablet Take 3 mg by mouth daily   Yes Historical Provider, MD   DiphenhydrAMINE HCl (BENADRYL ALLERGY PO) Take  by mouth as needed.    Yes Historical Provider, MD   cetirizine (ZYRTEC) 10 MG tablet Take 1 tablet by mouth daily 5/6/19   Sahra Hickey MD   fluticasone (FLONASE) 50 MCG/ACT nasal spray 1 spray by Nasal route daily 5/6/19   Sahra Hickey MD   brompheniramine-pseudoephedrine-DM 30-2-10 MG/5ML syrup Take 10 mLs by mouth 4 times daily as needed for Congestion or Cough 7/3/18   [facility]   VITAMIN A PO Take 1 tablet by mouth daily    Historical Provider, MD       Allergies   Allergen Reactions    Latex Anaphylaxis, Hives and Rash    Iodine Hives    Trazodone And Nefazodone Nausea And Vomiting    Bactrim Hives    Iodides Hives    Aspirin Hives and Nausea And Vomiting    Tylenol With Codeine [Acetaminophen-Codeine] Nausea And Vomiting    Ultram [Tramadol Hcl] Nausea And Vomiting    Vicodin Tuss [Hydrocodone-Guaifenesin] Nausea And Vomiting       Family History   Problem Relation Age of Onset    Cancer Mother     Heart Disease Mother     Asthma Mother     Stroke Mother    [facility] Migraines Mother     Arthritis Mother     High Blood Pressure Father     Cancer Father    [facility] Migraines Father     Cancer Sister     Stroke Sister     Migraines Sister        Social History     Tobacco Use    Smoking status: Never Smoker    Smokeless tobacco: Never Used   Substance Use Topics    Alcohol use: No     Alcohol/week: 0.0 oz    Drug use: No         Review of Systems: pertinent ROS listed in HPI, all others negative       PHYSICAL EXAM:    Vitals:    19 1243   BP: 116/76   Pulse: 91   SpO2: 98%       GENERAL:  NAD. A&Ox3. HEAD:  Normocephalic, atraumatic. LUNGS:  No increased work of breathing. CARDIOVASCULAR: RR  ABDOMEN:  Soft, non-distended, non-tender. No guarding, rigidity, rebound. RECTAL: No hemorrhoids. FOBT POSITIVE. Stool brown. Large stool ball present in rectal vault, attempted to disimpact. RADIOLOGY    Ct Abdomen Pelvis Wo Contrast    Result Date: 2019  Patient MRN:  [id number] : 1975 Age: 38 years Gender: Female Order Date:  2019 2:45 PM EXAM: CT ABDOMEN PELVIS WO CONTRAST COMPARISON: 2019 INDICATION:  persistent constipation and abdominal pain WITH ORAL CONTRAST PLEASE  TECHNIQUE:  Low-dose CT acquisition technique included one of the following options; 1. Automated exposure control, 2. Adjustment of mA and/or kV according to the patient's size or 3. Use of iterative reconstruction.  FINDINGS: There is redemonstration of large bowel stool throughout the colon notable at level of the rectum. There is fat stranding seen at posterior aspect of the rectum appearing similar compared to previous examination which may indicate proctitis. There is no evidence of bowel obstruction, free air, or evidence of free fluid. Appendix visualized and unremarkable. The urinary bladder is moderately distended. No intrahepatic or extra hepatic bile duct dilatation. There is evidence of cholecystectomy. No evidence of acute pancreatitis. Spleen is nonenlarged. Adrenal glands are unremarkable. There is mild right and left hydronephrosis and mild right and left hydroureter. No evidence of retroperitoneal lymphadenopathy. There is a small hiatal hernia. 1. Redemonstration of large amount of stool at level of the rectum with surrounding inflammatory changes. Clinical correlation recommended for possibility of proctitis. 2. Distended urinary bladder with mild bilateral hydroureter and hydronephrosis. Clinical correlation recommended. 3. No evidence of bowel obstruction, free air, or evidence of free fluid. Xr Abdomen (kub) (single Ap View)    Result Date: 2019  Patient MRN: [id number] : 1975 Age:  38 years Gender: Female Order Date: 2019 12:00 AM Exam: XR ABDOMEN (KUB) (SINGLE AP VIEW) Number of Views: 3 Indication:  Constipation x2 weeks Comparison: 2013. CT abdomen and pelvis 2019. Findings: Nonspecific bowel gas pattern. No acute fracture. Fecal retention overlying the rectum and scattered throughout the remainder of the colon. Surgical clips right mid upper abdomen. No abnormally dilated loops of small bowel.      1. Fecal retention within the rectum and throughout the remainder of the colon        ASSESSMENT/PLAN:  38 y.o. female with extensive family hx of multiple types of cancer, with chronic constipation    Regular bowel regimen  Increase daily fiber and H2O intake  Given high-risk would recommend colonoscopy    Plan will be discussed with Dr. Lizette Velazco.     Himanshu Villalba DO  Resident, PGY-1  5/13/2019  2:00 PM

## 2019-05-21 ENCOUNTER — TELEPHONE (OUTPATIENT)
Dept: SURGERY | Age: 44
End: 2019-05-21

## 2019-05-22 NOTE — TELEPHONE ENCOUNTER
MA attempted to contact patient back to schedule. MA spoke with patient's mother and she states patient is currently incapacitated and will call back when she is able.      Electronically signed by Kamille Tomas on 5/22/19 at 10:49 AM

## 2019-05-24 ENCOUNTER — APPOINTMENT (OUTPATIENT)
Dept: CT IMAGING | Age: 44
End: 2019-05-24

## 2019-05-24 ENCOUNTER — HOSPITAL ENCOUNTER (EMERGENCY)
Age: 44
Discharge: HOME | End: 2019-05-24
Attending: EMERGENCY MEDICINE

## 2019-05-24 VITALS
WEIGHT: 189 LBS | TEMPERATURE: 97.5 F | OXYGEN SATURATION: 99 % | BODY MASS INDEX: 37.11 KG/M2 | HEIGHT: 60 IN | SYSTOLIC BLOOD PRESSURE: 112 MMHG | RESPIRATION RATE: 14 BRPM | HEART RATE: 76 BPM | DIASTOLIC BLOOD PRESSURE: 80 MMHG

## 2019-05-24 DIAGNOSIS — R19.7 DIARRHEA, UNSPECIFIED TYPE: Primary | ICD-10-CM

## 2019-05-24 DIAGNOSIS — K59.00 CONSTIPATION, UNSPECIFIED CONSTIPATION TYPE: ICD-10-CM

## 2019-05-24 LAB
ALBUMIN SERPL-MCNC: 4.7 G/DL (ref 3.5–5.2)
ALP BLD-CCNC: 72 U/L (ref 35–104)
ALT SERPL-CCNC: 24 U/L (ref 0–32)
ANION GAP SERPL CALCULATED.3IONS-SCNC: 12 MMOL/L (ref 7–16)
AST SERPL-CCNC: 22 U/L (ref 0–31)
BASOPHILS ABSOLUTE: 0.05 E9/L (ref 0–0.2)
BASOPHILS RELATIVE PERCENT: 0.8 % (ref 0–2)
BILIRUB SERPL-MCNC: 0.4 MG/DL (ref 0–1.2)
BUN BLDV-MCNC: 7 MG/DL (ref 6–20)
CALCIUM SERPL-MCNC: 9.6 MG/DL (ref 8.6–10.2)
CHLORIDE BLD-SCNC: 106 MMOL/L (ref 98–107)
CO2: 25 MMOL/L (ref 22–29)
CREAT SERPL-MCNC: 0.9 MG/DL (ref 0.5–1)
EOSINOPHILS ABSOLUTE: 0.1 E9/L (ref 0.05–0.5)
EOSINOPHILS RELATIVE PERCENT: 1.6 % (ref 0–6)
GFR AFRICAN AMERICAN: >60
GFR NON-AFRICAN AMERICAN: >60 ML/MIN/1.73
GLUCOSE BLD-MCNC: 108 MG/DL (ref 74–99)
HCG QUALITATIVE: NEGATIVE
HCT VFR BLD CALC: 44.4 % (ref 34–48)
HEMOGLOBIN: 14.3 G/DL (ref 11.5–15.5)
IMMATURE GRANULOCYTES #: 0.02 E9/L
IMMATURE GRANULOCYTES %: 0.3 % (ref 0–5)
LACTIC ACID: 1.5 MMOL/L (ref 0.5–2.2)
LIPASE: 19 U/L (ref 13–60)
LYMPHOCYTES ABSOLUTE: 1.46 E9/L (ref 1.5–4)
LYMPHOCYTES RELATIVE PERCENT: 23.2 % (ref 20–42)
MCH RBC QN AUTO: 27.4 PG (ref 26–35)
MCHC RBC AUTO-ENTMCNC: 32.2 % (ref 32–34.5)
MCV RBC AUTO: 85.2 FL (ref 80–99.9)
MONOCYTES ABSOLUTE: 0.59 E9/L (ref 0.1–0.95)
MONOCYTES RELATIVE PERCENT: 9.4 % (ref 2–12)
NEUTROPHILS ABSOLUTE: 4.06 E9/L (ref 1.8–7.3)
NEUTROPHILS RELATIVE PERCENT: 64.7 % (ref 43–80)
PDW BLD-RTO: 14.1 FL (ref 11.5–15)
PLATELET # BLD: 329 E9/L (ref 130–450)
PMV BLD AUTO: 10.5 FL (ref 7–12)
POTASSIUM SERPL-SCNC: 4.1 MMOL/L (ref 3.5–5)
RBC # BLD: 5.21 E12/L (ref 3.5–5.5)
SODIUM BLD-SCNC: 143 MMOL/L (ref 132–146)
TOTAL PROTEIN: 7.9 G/DL (ref 6.4–8.3)
WBC # BLD: 6.3 E9/L (ref 4.5–11.5)

## 2019-05-24 PROCEDURE — 85025 COMPLETE CBC W/AUTO DIFF WBC: CPT

## 2019-05-24 PROCEDURE — 2580000003 HC RX 258: Performed by: EMERGENCY MEDICINE

## 2019-05-24 PROCEDURE — 99284 EMERGENCY DEPT VISIT MOD MDM: CPT

## 2019-05-24 PROCEDURE — 36415 COLL VENOUS BLD VENIPUNCTURE: CPT

## 2019-05-24 PROCEDURE — 83690 ASSAY OF LIPASE: CPT

## 2019-05-24 PROCEDURE — 74176 CT ABD & PELVIS W/O CONTRAST: CPT

## 2019-05-24 PROCEDURE — 83605 ASSAY OF LACTIC ACID: CPT

## 2019-05-24 PROCEDURE — 80053 COMPREHEN METABOLIC PANEL: CPT

## 2019-05-24 PROCEDURE — 84703 CHORIONIC GONADOTROPIN ASSAY: CPT

## 2019-05-24 RX ORDER — 0.9 % SODIUM CHLORIDE 0.9 %
1000 INTRAVENOUS SOLUTION INTRAVENOUS ONCE
Status: COMPLETED | OUTPATIENT
Start: 2019-05-24 | End: 2019-05-24

## 2019-05-24 RX ORDER — POLYETHYLENE GLYCOL 3350 17 G/17G
POWDER, FOR SOLUTION ORAL
Qty: 1 BOTTLE | Refills: 0 | Status: SHIPPED | OUTPATIENT
Start: 2019-05-24 | End: 2019-06-07

## 2019-05-24 RX ADMIN — SODIUM CHLORIDE 1000 ML: 9 INJECTION, SOLUTION INTRAVENOUS at 08:15

## 2019-05-24 ASSESSMENT — ENCOUNTER SYMPTOMS
DIARRHEA: 1
NAUSEA: 1
VOMITING: 1
SINUS PRESSURE: 0
SHORTNESS OF BREATH: 0
EYE PAIN: 0
EYE REDNESS: 0
BACK PAIN: 0
SORE THROAT: 0
COUGH: 0
WHEEZING: 0
ABDOMINAL DISTENTION: 0
ABDOMINAL PAIN: 1
EYE DISCHARGE: 0

## 2019-05-24 ASSESSMENT — PAIN SCALES - GENERAL: PAINLEVEL_OUTOF10: 9

## 2019-05-24 ASSESSMENT — PAIN DESCRIPTION - ORIENTATION: ORIENTATION: LOWER

## 2019-05-24 ASSESSMENT — PAIN DESCRIPTION - PROGRESSION: CLINICAL_PROGRESSION: GRADUALLY WORSENING

## 2019-05-24 ASSESSMENT — PAIN DESCRIPTION - DESCRIPTORS: DESCRIPTORS: ACHING

## 2019-05-24 ASSESSMENT — PAIN DESCRIPTION - LOCATION: LOCATION: ABDOMEN

## 2019-05-24 ASSESSMENT — PAIN DESCRIPTION - PAIN TYPE: TYPE: ACUTE PAIN

## 2019-05-24 ASSESSMENT — PAIN DESCRIPTION - FREQUENCY: FREQUENCY: CONTINUOUS

## 2019-05-24 NOTE — ED NOTES
Assumed care of pt. No s/s of distress. Resps easy. A&O. Pt states she took medicine for her constipation and now c/o diarrhea. Will continue to monitor.      Ellie Roblero RN  05/24/19 [id number]

## 2019-05-24 NOTE — ED NOTES
Pt states she will go to  at this time for urine specimen collection.      Buddy Cool RN  05/24/19 [id number]

## 2019-05-24 NOTE — ED PROVIDER NOTES
There is no tenderness. There is no rebound and no guarding. Musculoskeletal: She exhibits no edema. Neurological: She is alert and oriented to person, place, and time. No cranial nerve deficit. Coordination normal.   Skin: Skin is warm and dry. Nursing note and vitals reviewed. Procedures    MDM  Number of Diagnoses or Management Options  Constipation, unspecified constipation type:   Diarrhea, unspecified type:   Diagnosis management comments: 69-year-old female with diarrhea ×4 days. Patient diffuse abdominal tenderness and history of constipation. Labs are within normal limits and no evidence of electrolyte abnormalities percent treated by diarrhea. CT scan shows significant stool burden which is likely constipation. Thought that the patient's diarrhea secondary to liquid stool escaping around solid stools with fecaliths. She was counseled to need to stop using fiber and to increase her bowel regimen to promote bowel motility and remove the impacted feces. --------------------------------------------- PAST HISTORY ---------------------------------------------  Past Medical History:  has a past medical history of Migraine and Sinusitis (chronic). Past Surgical History:  has a past surgical history that includes Cholecystectomy (age 24); Tonsillectomy (age 1); Flora tooth extraction; and Adenoidectomy. Social History:  reports that she has never smoked. She has never used smokeless tobacco. She reports that she does not drink alcohol or use drugs. Family History: family history includes Arthritis in her mother; Asthma in her mother; Cancer in her father, mother, and sister; Heart Disease in her mother; High Blood Pressure in her father; Migraines in her father, mother, and sister; Stroke in her mother and sister. The patients home medications have been reviewed. Allergies: Latex; Iodine; Trazodone and nefazodone; Bactrim; Iodides; Aspirin;  Tylenol with codeine [acetaminophen-codeine];  Ultram [tramadol hcl]; and Vicodin tuss [hydrocodone-guaifenesin]    -------------------------------------------------- RESULTS -------------------------------------------------  Labs:  Results for orders placed or performed during the hospital encounter of 05/24/19   CBC Auto Differential   Result Value Ref Range    WBC 6.3 4.5 - 11.5 E9/L    RBC 5.21 3.50 - 5.50 E12/L    Hemoglobin 14.3 11.5 - 15.5 g/dL    Hematocrit 44.4 34.0 - 48.0 %    MCV 85.2 80.0 - 99.9 fL    MCH 27.4 26.0 - 35.0 pg    MCHC 32.2 32.0 - 34.5 %    RDW 14.1 11.5 - 15.0 fL    Platelets 417 408 - 501 E9/L    MPV 10.5 7.0 - 12.0 fL    Neutrophils % 64.7 43.0 - 80.0 %    Immature Granulocytes % 0.3 0.0 - 5.0 %    Lymphocytes % 23.2 20.0 - 42.0 %    Monocytes % 9.4 2.0 - 12.0 %    Eosinophils % 1.6 0.0 - 6.0 %    Basophils % 0.8 0.0 - 2.0 %    Neutrophils # 4.06 1.80 - 7.30 E9/L    Immature Granulocytes # 0.02 E9/L    Lymphocytes # 1.46 (L) 1.50 - 4.00 E9/L    Monocytes # 0.59 0.10 - 0.95 E9/L    Eosinophils # 0.10 0.05 - 0.50 E9/L    Basophils # 0.05 0.00 - 0.20 E9/L   Comprehensive Metabolic Panel   Result Value Ref Range    Sodium 143 132 - 146 mmol/L    Potassium 4.1 3.5 - 5.0 mmol/L    Chloride 106 98 - 107 mmol/L    CO2 25 22 - 29 mmol/L    Anion Gap 12 7 - 16 mmol/L    Glucose 108 (H) 74 - 99 mg/dL    BUN 7 6 - 20 mg/dL    CREATININE 0.9 0.5 - 1.0 mg/dL    GFR Non-African American >60 >=60 mL/min/1.73    GFR African American >60     Calcium 9.6 8.6 - 10.2 mg/dL    Total Protein 7.9 6.4 - 8.3 g/dL    Alb 4.7 3.5 - 5.2 g/dL    Total Bilirubin 0.4 0.0 - 1.2 mg/dL    Alkaline Phosphatase 72 35 - 104 U/L    ALT 24 0 - 32 U/L    AST 22 0 - 31 U/L   Lactic Acid, Plasma   Result Value Ref Range    Lactic Acid 1.5 0.5 - 2.2 mmol/L   Lipase   Result Value Ref Range    Lipase 19 13 - 60 U/L   HCG, SERUM, QUALITATIVE   Result Value Ref Range    hCG Qual NEGATIVE NEGATIVE       Radiology:  CT ABDOMEN PELVIS WO CONTRAST   Final Result   ALERT:  THIS IS AN ABNORMAL REPORT   1. Severe fecal retention within the rectum and sigmoid colon   measuring approximately 8.4 x 2.2 cm transverse by anterior posterior   within the rectum and extending for at least 22 cm longitudinal   dimension in the rectum and sigmoid colon with continued severe fecal   retention throughout the remainder the colon through the cecum. 2. Mass effect upon the bladder and uterus by the severe fecal   retention, with anterior displacement. 3. Abnormal appearance of the distal esophagus with circumferential   wall thickening in the setting of at least a moderate sized hiatal   hernia, circumferential infiltrative esophageal wall malignancy is not   excluded given this appearance, gastrointestinal specialist   consultation and direct examination and evaluation is recommended. 4. The appendix is not identified and therefore not evaluated. Acute   appendicitis is not excluded without positive identification. 5. Moderate degenerative disc disease L5-S1.          ------------------------- NURSING NOTES AND VITALS REVIEWED ---------------------------  Date / Time Roomed:  5/24/2019  7:49 AM  ED Bed Assignment:  17A/17A-17    The nursing notes within the ED encounter and vital signs as below have been reviewed. /80   Pulse 76   Temp 97.5 °F (36.4 °C) (Oral)   Resp 14   Ht 5' (1.524 m)   Wt 189 lb (85.7 kg)   LMP 05/04/2019   SpO2 99%   Breastfeeding? No   BMI 36.91 kg/m²   Oxygen Saturation Interpretation: Normal      ------------------------------------------ PROGRESS NOTES ------------------------------------------         1:06 PM  I have spoken with the patient and discussed todays results, in addition to providing specific details for the plan of care and counseling regarding the diagnosis and prognosis. Their questions are answered at this time and they are agreeable with the plan.  I discussed at length with them reasons for immediate return here for re evaluation. They will followup with their primary care physician by calling their office on Monday.      --------------------------------- ADDITIONAL PROVIDER NOTES ---------------------------------  At this time the patient is without objective evidence of an acute process requiring hospitalization or inpatient management. They have remained hemodynamically stable throughout their entire ED visit and are stable for discharge with outpatient follow-up. The plan has been discussed in detail and they are aware of the specific conditions for emergent return, as well as the importance of follow-up. New Prescriptions    POLYETHYLENE GLYCOL (MIRALAX) POWDER    Take 17 g by mouth daily for 14 days. Dispense QS, and no refills       Diagnosis:  1. Diarrhea, unspecified type    2. Constipation, unspecified constipation type        Disposition:  Patient's disposition: Discharge to home  Patient's condition is stable. NOTE: This report was transcribed using voice recognition software. Every effort was made to ensure accuracy; however, inadvertent computerized transcription errors may be present.             Shantel Majano,   Resident  05/24/19 0696

## 2019-05-24 NOTE — ED NOTES
Urine cup provided for urine specimen. Pt states \"I'm not able to urinate at this time. \"     Cat Jensen RN  05/24/19 2378

## 2019-05-29 ENCOUNTER — TELEPHONE (OUTPATIENT)
Dept: SURGERY | Age: 44
End: 2019-05-29

## 2019-05-29 NOTE — TELEPHONE ENCOUNTER
Scheduled pt for colonoscopy with Dr. Wes Ugalde on 6/4/19 at 10:45AM. Pt needs to arrive at [facility] at 9:45AM. Confirmed procedure date time and location with patient. Sent instruction sheet.     Electronically signed by Yvan Kumar on 5/29/19 at 9:45 AM

## 2019-05-31 ENCOUNTER — TELEPHONE (OUTPATIENT)
Dept: SURGERY | Age: 44
End: 2019-05-31

## 2019-05-31 NOTE — TELEPHONE ENCOUNTER
Pt called in to reschedule her colonoscopy with Dr. Ruben Goldberg on 6/4/19. She states she cannot get out of work. She is looking to reschedule towards the end of June to middle of July due to having some time off. She asked that Delvis Jackson call her back at [phone]. Routing message to Delvis Jackson for informational purposes.     Electronically signed by Lalo Marley CMA on 5/31/19 at 9:22 AM

## 2019-06-28 ENCOUNTER — TELEPHONE (OUTPATIENT)
Dept: SURGERY | Age: 44
End: 2019-06-28

## 2019-07-30 ENCOUNTER — OFFICE VISIT (OUTPATIENT)
Dept: INTERNAL MEDICINE | Age: 44
End: 2019-07-30
Payer: MEDICAID

## 2019-07-30 VITALS
HEART RATE: 74 BPM | TEMPERATURE: 97.8 F | SYSTOLIC BLOOD PRESSURE: 105 MMHG | BODY MASS INDEX: 37.21 KG/M2 | HEIGHT: 60 IN | RESPIRATION RATE: 16 BRPM | DIASTOLIC BLOOD PRESSURE: 60 MMHG | WEIGHT: 189.5 LBS
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
## 2019-07-30 DIAGNOSIS — G43.109 MIGRAINE WITH AURA AND WITHOUT STATUS MIGRAINOSUS, NOT INTRACTABLE: Chronic | ICD-10-CM

## 2019-07-30 DIAGNOSIS — M25.562 ACUTE PAIN OF LEFT KNEE: ICD-10-CM

## 2019-07-30 DIAGNOSIS — Z88.9 H/O SEASONAL ALLERGIES: ICD-10-CM

## 2019-07-30 PROCEDURE — 99213 OFFICE O/P EST LOW 20 MIN: CPT | Performed by: INTERNAL MEDICINE

## 2019-07-30 RX ORDER — CETIRIZINE HYDROCHLORIDE 10 MG/1
10 TABLET ORAL DAILY
Qty: 30 TABLET | Refills: 3 | Status: SHIPPED | OUTPATIENT
Start: 2019-07-30 | End: 2019-11-12 | Stop reason: SDUPTHER

## 2019-07-30 RX ORDER — AMITRIPTYLINE HYDROCHLORIDE 150 MG/1
TABLET, FILM COATED ORAL
Qty: 90 TABLET | Refills: 1 | Status: SHIPPED | OUTPATIENT
Start: 2019-07-30 | End: 2019-11-12 | Stop reason: SDUPTHER

## 2019-07-30 RX ORDER — SUMATRIPTAN 100 MG/1
TABLET, FILM COATED ORAL
Qty: 27 TABLET | Refills: 0 | Status: SHIPPED | OUTPATIENT
Start: 2019-07-30 | End: 2019-11-12 | Stop reason: SDUPTHER

## 2019-07-30 ASSESSMENT — PATIENT HEALTH QUESTIONNAIRE - PHQ9
SUM OF ALL RESPONSES TO PHQ QUESTIONS 1-9: 0
SUM OF ALL RESPONSES TO PHQ9 QUESTIONS 1 & 2: 0
SUM OF ALL RESPONSES TO PHQ QUESTIONS 1-9: 0
2. FEELING DOWN, DEPRESSED OR HOPELESS: 0
1. LITTLE INTEREST OR PLEASURE IN DOING THINGS: 0

## 2019-07-30 NOTE — PROGRESS NOTES
Patient given discharge instructions by Dr. Peyman Rogers.  2 month follow up appt scheduled and AVS mailed to patient at her request.

## 2019-07-30 NOTE — PATIENT INSTRUCTIONS
Be compliant with medications  Voltaren gel on affected area as needed  Warm compress   Stretching exercises    Follow-up referral to neurology

## 2019-09-09 ENCOUNTER — OFFICE VISIT (OUTPATIENT)
Dept: OBGYN | Age: 44
End: 2019-09-09
Payer: MEDICAID

## 2019-09-09 ENCOUNTER — HOSPITAL ENCOUNTER (OUTPATIENT)
Age: 44
Discharge: HOME | End: 2019-09-11

## 2019-09-09 VITALS
BODY MASS INDEX: 36.52 KG/M2 | HEIGHT: 60 IN | SYSTOLIC BLOOD PRESSURE: 116 MMHG | TEMPERATURE: 97.9 F | RESPIRATION RATE: 16 BRPM | WEIGHT: 186 LBS | DIASTOLIC BLOOD PRESSURE: 73 MMHG | HEART RATE: 78 BPM

## 2019-09-09 DIAGNOSIS — Z12.4 SCREENING FOR MALIGNANT NEOPLASM OF CERVIX: ICD-10-CM

## 2019-09-09 DIAGNOSIS — Z12.39 BREAST CANCER SCREENING: ICD-10-CM

## 2019-09-09 DIAGNOSIS — Z01.419 WELL WOMAN EXAM WITH ROUTINE GYNECOLOGICAL EXAM: Primary | ICD-10-CM

## 2019-09-09 PROCEDURE — G0123 SCREEN CERV/VAG THIN LAYER: HCPCS

## 2019-09-09 PROCEDURE — 87624 HPV HI-RISK TYP POOLED RSLT: CPT

## 2019-09-09 PROCEDURE — 99213 OFFICE O/P EST LOW 20 MIN: CPT | Performed by: NURSE PRACTITIONER

## 2019-09-09 PROCEDURE — 99396 PREV VISIT EST AGE 40-64: CPT | Performed by: NURSE PRACTITIONER

## 2019-09-09 ASSESSMENT — ENCOUNTER SYMPTOMS: GASTROINTESTINAL NEGATIVE: 1

## 2019-09-09 NOTE — PROGRESS NOTES
Pelvic exam by litzy edouard cnp. Specimen for pap smear obtained and hand delivered to lab. Discharged instructions given by litzy edouard cnp. Mammogram rx faxed to [address]. they will call patient to schedule.

## 2019-09-09 NOTE — PROGRESS NOTES
Subjective:      Patient ID: Tristan Cordoba is a 38 y.o. female. HPI  Patient in today for annual examination as an established patient. Last pap smear was 9/15 and was negative for intraepithelial lesion or malignancy and HPV negative. Patient's last menstrual period was 2019. Periods have been regular since stopping depo injections last year.     Has not been sexually active for a decade. Declines STD testing. Due for a screening mammogram. No concerns with breasts. No concerns today. Review of Systems   Constitutional: Negative. Gastrointestinal: Negative. Genitourinary: Negative. Psychiatric/Behavioral: Negative. All other systems reviewed and are negative. Objective:   Physical Exam   Constitutional: She is oriented to person, place, and time. She appears well-developed and well-nourished. Cardiovascular: Normal rate and regular rhythm. Pulmonary/Chest: Effort normal and breath sounds normal. Right breast exhibits no inverted nipple, no mass, no nipple discharge, no skin change and no tenderness. Left breast exhibits no inverted nipple, no mass, no nipple discharge, no skin change and no tenderness. Abdominal: Soft. She exhibits no mass. There is no tenderness. Genitourinary: Uterus normal. There is no rash, tenderness, lesion or injury on the right labia. There is no rash, tenderness, lesion or injury on the left labia. Cervix exhibits no motion tenderness, no discharge and no friability. Right adnexum displays no mass, no tenderness and no fullness. Left adnexum displays no mass, no tenderness and no fullness. No erythema, tenderness or bleeding in the vagina. No foreign body in the vagina. No signs of injury around the vagina. No vaginal discharge found. Musculoskeletal: Normal range of motion. Lymphadenopathy:        Right: No inguinal adenopathy present. Left: No inguinal adenopathy present.    Neurological: She is alert and oriented to person, place, and time. Skin: Skin is warm and dry. Psychiatric: She has a normal mood and affect. Nursing note and vitals reviewed. Assessment:      1. Well woman exam with routine gynecological exam    2. Screening for malignant neoplasm of cervix    3. Breast cancer screening            Plan:      Orders Placed This Encounter   Procedures    LISE DIGITAL SCREEN W CAD BILATERAL    PAP SMEAR     The patient was informed about the importance of regular gynecological  examination and pap smear. She was also  informed of the need for yearly mammogram after the age of 36. After age 48 ,a colonoscopy should be scheduled through her primary care physician (PCP). This will help decrease the risk of colon cancer. During the reproductive years, she should take folic acid daily in order to decrease the risk of neural tube defects such as spina bifida. Adequate calcium and vitamin D intake should be added to a healthy diet ,and weight bearing exercise continued daily for cardiovascular and bone health. The patient was reminded of the importance of safe sexual practices including a mutually monogamous relationship and the use of  barrier contraception. All vaccinations such as flu, tetanus, gardasil ( for cervical cancer ), meningitis, pneumonia and shingles should be updated by her PCP. If she is not established with a PCP, she may schedule an appointment at the medical clinic or at the [facility]. Return in about 1 year (around 9/9/2020), or if symptoms worsen or fail to improve, for annual exam.  All questions and concerns addressed. Patient voices understanding of plan of care.           NANCY Lu CNM

## 2019-09-12 LAB
HPV SAMPLE: NORMAL
HPV TYPE 16: NOT DETECTED
HPV TYPE 18: NOT DETECTED
HPV, HIGH RISK OTHER: NOT DETECTED
INTERPRETATION: NORMAL
SOURCE: NORMAL

## 2019-10-23 ENCOUNTER — HOSPITAL ENCOUNTER (OUTPATIENT)
Dept: GENERAL RADIOLOGY | Age: 44
Discharge: HOME | End: 2019-10-25

## 2019-10-23 DIAGNOSIS — Z12.39 BREAST CANCER SCREENING: ICD-10-CM

## 2019-10-23 DIAGNOSIS — Z01.419 WELL WOMAN EXAM WITH ROUTINE GYNECOLOGICAL EXAM: ICD-10-CM

## 2019-10-23 PROCEDURE — 77063 BREAST TOMOSYNTHESIS BI: CPT

## 2019-10-28 ENCOUNTER — TELEPHONE (OUTPATIENT)
Dept: ONCOLOGY | Age: 44
End: 2019-10-28

## 2019-11-12 ENCOUNTER — OFFICE VISIT (OUTPATIENT)
Dept: INTERNAL MEDICINE | Age: 44
End: 2019-11-12
Payer: MEDICAID

## 2019-11-12 VITALS
SYSTOLIC BLOOD PRESSURE: 125 MMHG | HEART RATE: 99 BPM | RESPIRATION RATE: 16 BRPM | HEIGHT: 60 IN | TEMPERATURE: 98.6 F | WEIGHT: 185 LBS | BODY MASS INDEX: 36.32 KG/M2 | DIASTOLIC BLOOD PRESSURE: 65 MMHG

## 2019-11-12 DIAGNOSIS — L20.82 FLEXURAL ECZEMA: ICD-10-CM

## 2019-11-12 DIAGNOSIS — G43.109 MIGRAINE WITH AURA AND WITHOUT STATUS MIGRAINOSUS, NOT INTRACTABLE: Chronic | ICD-10-CM

## 2019-11-12 DIAGNOSIS — Z78.9 TAKES IRON SUPPLEMENTS: Primary | ICD-10-CM

## 2019-11-12 DIAGNOSIS — Z88.9 H/O SEASONAL ALLERGIES: ICD-10-CM

## 2019-11-12 PROCEDURE — G0008 ADMIN INFLUENZA VIRUS VAC: HCPCS

## 2019-11-12 PROCEDURE — 6360000002 HC RX W HCPCS

## 2019-11-12 PROCEDURE — 90686 IIV4 VACC NO PRSV 0.5 ML IM: CPT

## 2019-11-12 PROCEDURE — 99213 OFFICE O/P EST LOW 20 MIN: CPT | Performed by: INTERNAL MEDICINE

## 2019-11-12 PROCEDURE — 99212 OFFICE O/P EST SF 10 MIN: CPT | Performed by: INTERNAL MEDICINE

## 2019-11-12 RX ORDER — AMITRIPTYLINE HYDROCHLORIDE 150 MG/1
TABLET, FILM COATED ORAL
Qty: 90 TABLET | Refills: 3 | Status: SHIPPED | OUTPATIENT
Start: 2019-11-12 | End: 2020-05-04 | Stop reason: SDUPTHER

## 2019-11-12 RX ORDER — SUMATRIPTAN 100 MG/1
TABLET, FILM COATED ORAL
Qty: 20 TABLET | Refills: 0 | Status: SHIPPED | OUTPATIENT
Start: 2019-11-12 | End: 2020-05-04 | Stop reason: SDUPTHER

## 2019-11-12 RX ORDER — CHLORPHENIRAMINE MALEATE 4 MG/1
4 TABLET ORAL EVERY 6 HOURS PRN
Qty: 120 TABLET | Refills: 3 | Status: SHIPPED | OUTPATIENT
Start: 2019-11-12 | End: 2021-02-11 | Stop reason: SDUPTHER

## 2019-11-12 RX ORDER — CETIRIZINE HYDROCHLORIDE 10 MG/1
10 TABLET ORAL DAILY
Qty: 30 TABLET | Refills: 3 | Status: SHIPPED | OUTPATIENT
Start: 2019-11-12 | End: 2020-08-13 | Stop reason: SDUPTHER

## 2019-11-12 RX ORDER — FERROUS SULFATE 325(65) MG
325 TABLET ORAL
Qty: 30 TABLET | Refills: 3 | Status: CANCELLED | OUTPATIENT
Start: 2019-11-12

## 2019-11-12 ASSESSMENT — ENCOUNTER SYMPTOMS
DIARRHEA: 0
SHORTNESS OF BREATH: 0
SINUS PRESSURE: 0
ABDOMINAL PAIN: 0
COUGH: 0
WHEEZING: 0
BACK PAIN: 0
EYE REDNESS: 0
RHINORRHEA: 0
CONSTIPATION: 0
PHOTOPHOBIA: 0
SORE THROAT: 0

## 2019-11-19 ENCOUNTER — HOSPITAL ENCOUNTER (OUTPATIENT)
Dept: GENERAL RADIOLOGY | Age: 44
Discharge: HOME | End: 2019-11-21
Payer: COMMERCIAL

## 2019-11-19 ENCOUNTER — HOSPITAL ENCOUNTER (OUTPATIENT)
Age: 44
Discharge: HOME | End: 2019-11-19

## 2019-11-19 ENCOUNTER — HOSPITAL ENCOUNTER (OUTPATIENT)
Dept: GENERAL RADIOLOGY | Age: 44
End: 2019-11-19
Payer: COMMERCIAL

## 2019-11-19 DIAGNOSIS — Z78.9 TAKES IRON SUPPLEMENTS: ICD-10-CM

## 2019-11-19 DIAGNOSIS — R92.8 ABNORMAL MAMMOGRAM: ICD-10-CM

## 2019-11-19 DIAGNOSIS — R92.8 ABNORMAL MAMMOGRAM OF LEFT BREAST: Primary | ICD-10-CM

## 2019-11-19 LAB
BASOPHILS ABSOLUTE: 0.05 E9/L (ref 0–0.2)
BASOPHILS RELATIVE PERCENT: 0.7 % (ref 0–2)
EOSINOPHILS ABSOLUTE: 0.13 E9/L (ref 0.05–0.5)
EOSINOPHILS RELATIVE PERCENT: 1.9 % (ref 0–6)
FERRITIN: 51 NG/ML
HCT VFR BLD CALC: 41.8 % (ref 34–48)
HEMOGLOBIN: 13.2 G/DL (ref 11.5–15.5)
IMMATURE GRANULOCYTES #: 0.03 E9/L
IMMATURE GRANULOCYTES %: 0.4 % (ref 0–5)
IRON SATURATION: 19 % (ref 15–50)
IRON: 68 MCG/DL (ref 37–145)
LYMPHOCYTES ABSOLUTE: 1.76 E9/L (ref 1.5–4)
LYMPHOCYTES RELATIVE PERCENT: 25.7 % (ref 20–42)
MCH RBC QN AUTO: 27.2 PG (ref 26–35)
MCHC RBC AUTO-ENTMCNC: 31.6 % (ref 32–34.5)
MCV RBC AUTO: 86 FL (ref 80–99.9)
MONOCYTES ABSOLUTE: 0.43 E9/L (ref 0.1–0.95)
MONOCYTES RELATIVE PERCENT: 6.3 % (ref 2–12)
NEUTROPHILS ABSOLUTE: 4.45 E9/L (ref 1.8–7.3)
NEUTROPHILS RELATIVE PERCENT: 65 % (ref 43–80)
PDW BLD-RTO: 13.8 FL (ref 11.5–15)
PLATELET # BLD: 322 E9/L (ref 130–450)
PMV BLD AUTO: 9.8 FL (ref 7–12)
RBC # BLD: 4.86 E12/L (ref 3.5–5.5)
TOTAL IRON BINDING CAPACITY: 359 MCG/DL (ref 250–450)
WBC # BLD: 6.9 E9/L (ref 4.5–11.5)

## 2019-11-19 PROCEDURE — 83550 IRON BINDING TEST: CPT

## 2019-11-19 PROCEDURE — 76642 ULTRASOUND BREAST LIMITED: CPT

## 2019-11-19 PROCEDURE — 36415 COLL VENOUS BLD VENIPUNCTURE: CPT

## 2019-11-19 PROCEDURE — 85025 COMPLETE CBC W/AUTO DIFF WBC: CPT

## 2019-11-19 PROCEDURE — 83540 ASSAY OF IRON: CPT

## 2019-11-19 PROCEDURE — 82728 ASSAY OF FERRITIN: CPT

## 2019-12-06 ENCOUNTER — TELEPHONE (OUTPATIENT)
Dept: OBGYN | Age: 44
End: 2019-12-06

## 2019-12-19 ENCOUNTER — HOSPITAL ENCOUNTER (OUTPATIENT)
Dept: MRI IMAGING | Age: 44
Discharge: HOME | End: 2019-12-21
Payer: COMMERCIAL

## 2019-12-19 DIAGNOSIS — R92.8 ABNORMAL MAMMOGRAM OF LEFT BREAST: ICD-10-CM

## 2019-12-19 PROCEDURE — 6360000004 HC RX CONTRAST MEDICATION: Performed by: RADIOLOGY

## 2019-12-19 PROCEDURE — 77049 MRI BREAST C-+ W/CAD BI: CPT

## 2019-12-19 PROCEDURE — 77046 MRI BREAST C- UNILATERAL: CPT

## 2019-12-19 PROCEDURE — A9577 INJ MULTIHANCE: HCPCS | Performed by: RADIOLOGY

## 2019-12-19 RX ADMIN — GADOBENATE DIMEGLUMINE 18 ML: 529 INJECTION, SOLUTION INTRAVENOUS at 07:55

## 2019-12-20 ENCOUNTER — TELEPHONE (OUTPATIENT)
Dept: GENERAL RADIOLOGY | Age: 44
End: 2019-12-20

## 2019-12-20 DIAGNOSIS — R92.8 ABNORMAL MAMMOGRAM: Primary | ICD-10-CM

## 2019-12-23 ENCOUNTER — TELEPHONE (OUTPATIENT)
Dept: GENERAL RADIOLOGY | Age: 44
End: 2019-12-23

## 2020-01-09 ENCOUNTER — HOSPITAL ENCOUNTER (OUTPATIENT)
Dept: MRI IMAGING | Age: 45
Discharge: HOME | End: 2020-01-11
Payer: COMMERCIAL

## 2020-01-09 ENCOUNTER — HOSPITAL ENCOUNTER (OUTPATIENT)
Dept: GENERAL RADIOLOGY | Age: 45
Discharge: HOME | End: 2020-01-11
Payer: COMMERCIAL

## 2020-01-09 PROCEDURE — 88305 TISSUE EXAM BY PATHOLOGIST: CPT

## 2020-01-09 PROCEDURE — 2720000010 MRI BIOPSY BREAST W LOC DEVICE LEFT 1ST LESION

## 2020-01-09 PROCEDURE — 2500000003 HC RX 250 WO HCPCS

## 2020-01-09 PROCEDURE — A9577 INJ MULTIHANCE: HCPCS | Performed by: RADIOLOGY

## 2020-01-09 PROCEDURE — 77065 DX MAMMO INCL CAD UNI: CPT

## 2020-01-09 PROCEDURE — 2720000010 MRI BIOPSY BREAST W LOC DEVICE RIGHT 1ST LESION

## 2020-01-09 PROCEDURE — 6360000004 HC RX CONTRAST MEDICATION: Performed by: RADIOLOGY

## 2020-01-09 RX ADMIN — GADOBENATE DIMEGLUMINE 17 ML: 529 INJECTION, SOLUTION INTRAVENOUS at 13:37

## 2020-01-15 ENCOUNTER — TELEPHONE (OUTPATIENT)
Dept: GENERAL RADIOLOGY | Age: 45
End: 2020-01-15

## 2020-01-15 NOTE — TELEPHONE ENCOUNTER
[facility], patient was asked prior to biopsy how she would like notified of her breast biopsy results and she elected telephone call from breast navigator. Call to patient today to instruct her that the pathology report from her left breast biopsy indicates a benign finding: fibrocystic change and usual ductal hyperplasia. Instructed that the findings in the right breast also are benign: fibroadenomatoid hyperplasia and PASH. Instructed that while [address] [place] [address] Drive is a benign finding, some experts recommend surgical excision for further evaluation, while others do not recommend excision if patients are asymptomatic. Instructed that the performing radiologist is recommending a consultation with a surgeon regarding the right breast biopsy findings. Instructed that the surgeon will make a recommendation regarding whether or not further surgical intervention is indicated. Samantha verbalizes understanding and states she will follow up with [facility] regarding a referral to a surgeon. Pathology report route to [facility].  Electronically signed by Sangeeta Hurst RN, BSN on 1/15/2020 at 12:31 PM

## 2020-01-16 ENCOUNTER — TELEPHONE (OUTPATIENT)
Dept: OBGYN | Age: 45
End: 2020-01-16

## 2020-01-16 NOTE — TELEPHONE ENCOUNTER
I placed a referral for patient to see DR. Sawyer regarding [address] [place] [address] Drive.

## 2020-02-21 ENCOUNTER — OFFICE VISIT (OUTPATIENT)
Dept: BREAST CENTER | Age: 45
End: 2020-02-21
Payer: MEDICAID

## 2020-02-21 VITALS
TEMPERATURE: 98.2 F | SYSTOLIC BLOOD PRESSURE: 136 MMHG | DIASTOLIC BLOOD PRESSURE: 72 MMHG | HEIGHT: 60 IN | WEIGHT: 202 LBS | BODY MASS INDEX: 39.66 KG/M2 | HEART RATE: 102 BPM | OXYGEN SATURATION: 98 % | RESPIRATION RATE: 16 BRPM

## 2020-02-21 PROCEDURE — 99244 OFF/OP CNSLTJ NEW/EST MOD 40: CPT | Performed by: SURGERY

## 2020-02-21 PROCEDURE — 99203 OFFICE O/P NEW LOW 30 MIN: CPT

## 2020-02-21 ASSESSMENT — ENCOUNTER SYMPTOMS
EYES NEGATIVE: 1
DIARRHEA: 0
BLOOD IN STOOL: 0
NAUSEA: 0
CONSTIPATION: 0
ALLERGIC/IMMUNOLOGIC NEGATIVE: 1
SHORTNESS OF BREATH: 0
RESPIRATORY NEGATIVE: 1
COUGH: 0
GASTROINTESTINAL NEGATIVE: 1
VOMITING: 0
ABDOMINAL PAIN: 0
BACK PAIN: 0

## 2020-02-21 NOTE — LETTER
[address] [facility] [address] [address]  Phone: [phone]  Fax: Karina Lobato MD        February 21, 2020     Andrés Dickson MD  [facility]    Patient: Kang Gaston  MR Number: [id number]  YOB: 1975  Date of Visit: 2/21/2020    Dear Dr. Andrés Dickson: Thank you for the request for consultation for Madison Allijessica to me for the evaluation of [address] [place] [address] Drive and high risk for breast cancer. Below are the relevant portions of my assessment and plan of care. Primus Yany is at super high risk for breast cancer, but at this time she needs no surgical intervention. I highly recommended genetic testing and seeing genetic counselors, but she is going to think about it. I recommended chemoprevention and provided literature. I agree with continuing every 6 months alternating mammogram and ultrasound. I recommended colonoscopy, she is going to think about it. We will see her back in 6 months in our high risk breast clinic. If you have questions, please do not hesitate to call me. I look forward to following Primus Yany along with you.     Sincerely,        Karyna Contreras MD

## 2020-02-21 NOTE — PROGRESS NOTES
drugs. Past Medical History:   Diagnosis Date    Migraine 20 years    Sinusitis (chronic)        Past Surgical History:   Procedure Laterality Date    ADENOIDECTOMY      CHOLECYSTECTOMY  age 24   [facility] TONSILLECTOMY  age 1    WISDOM TOOTH EXTRACTION         Current Outpatient Medications   Medication Sig Dispense Refill    amitriptyline (ELAVIL) 150 MG tablet TAKE ONE TABLET BY MOUTH NIGHTLY 90 tablet 3    chlorpheniramine (EQ CHLORTABS) 4 MG tablet Take 1 tablet by mouth every 6 hours as needed for Allergies 120 tablet 3    mineral oil-hydrophilic petrolatum (AQUAPHOR) ointment APPLY TOPICALLY AS NEEDED 52.5 g 3    SUMAtriptan (IMITREX) 100 MG tablet Take one tablet  prn headache. May repeat in 2 hours. No more than 200 mg in 24 hours. Do not treat greater than 4 headaches in 30 days. 20 tablet 0    diclofenac sodium 1 % GEL Apply 2 g topically 4 times daily 2 Tube 1    Ascorbic Acid (VITAMIN C) 500 MG tablet Take 500 mg by mouth daily      VITAMIN A PO Take 1 tablet by mouth daily      vitamin B-12 (CYANOCOBALAMIN) 100 MCG tablet Take 50 mcg by mouth daily      Biotin 1 MG CAPS Take 1 tablet by mouth daily      vitamin D3 (CHOLECALCIFEROL) 400 UNITS TABS tablet Take 400 Units by mouth daily      Multiple Vitamins-Minerals (MULTIVITAMIN GUMMIES ADULT PO) Take 1 tablet by mouth daily      Misc. Devices MISC Please provide braces for carpal tunnel syndrome, bilateral. 2 Device 0    melatonin 3 MG TABS tablet Take 3 mg by mouth daily      DiphenhydrAMINE HCl (BENADRYL ALLERGY PO) Take  by mouth as needed.       cetirizine (ZYRTEC) 10 MG tablet Take 1 tablet by mouth daily (Patient not taking: Reported on 2/21/2020) 30 tablet 3    hydrocortisone 2.5 % cream Apply to affected area (Patient not taking: Reported on 2/21/2020) 30 g 3    Psyllium (METAMUCIL) 28.3 % POWD Takes 1 teaspoon daily  otc med (Patient not taking: Reported on 2/21/2020) 1 Bottle 2    fluticasone (FLONASE) 50 MCG/ACT nasal spray 1 spray by Nasal route daily (Patient not taking: Reported on 2/21/2020) 1 Bottle 3    brompheniramine-pseudoephedrine-DM 30-2-10 MG/5ML syrup Take 10 mLs by mouth 4 times daily as needed for Congestion or Cough (Patient not taking: Reported on 2/21/2020) 120 mL 0    ferrous sulfate 325 (65 FE) MG tablet Take 650 mg by mouth daily (with breakfast)       Current Facility-Administered Medications   Medication Dose Route Frequency Provider Last Rate Last Dose    medroxyPROGESTERone (DEPO-PROVERA) injection 150 mg  150 mg Intramuscular Q3 Months NANCY Garner - CNM   150 mg at 04/03/18 1555       Allergies   Allergen Reactions    Latex Anaphylaxis, Hives and Rash    Iodine Hives    Trazodone And Nefazodone Nausea And Vomiting    Bactrim Hives    Iodides Hives    Aspirin Hives and Nausea And Vomiting    Tylenol With Codeine [Acetaminophen-Codeine] Nausea And Vomiting    Ultram [Tramadol Hcl] Nausea And Vomiting    Vicodin Tuss [Hydrocodone-Guaifenesin] Nausea And Vomiting       Family History   Problem Relation Age of Onset    Cancer Mother 29        breast, bilateral, more than once; gastric cancer    Heart Disease Mother     Asthma Mother     Stroke Mother     Migraines Mother     Arthritis Mother     Colon Cancer Mother 27        rectal cancer    High Blood Pressure Father     Cancer Father         prostate    Migraines Father     Cancer Sister 29        breast and ovarian    Stroke Sister     Breast Cancer Maternal Grandmother     Cancer Paternal Grandmother         breast    Cancer Paternal Grandfather         prostate    Migraines Sister     Breast Cancer Maternal Aunt     Cancer Maternal Aunt         ovarian cancer    Colon Cancer Maternal Aunt     Breast Cancer Paternal Aunt     Prostate Cancer Paternal Uncle     Breast Cancer Maternal Cousin         under age of 48    Cancer Maternal Cousin         ovarian    Breast Cancer Maternal Aunt     Cancer Maternal Aunt Abdomen is soft. Tenderness: There is no abdominal tenderness. Musculoskeletal: Normal range of motion. General: No swelling or tenderness. Lymphadenopathy:      Cervical: No cervical adenopathy. Right cervical: No superficial cervical adenopathy. Left cervical: No superficial cervical adenopathy. Upper Body:      Right upper body: No supraclavicular or axillary adenopathy. Left upper body: No supraclavicular or axillary adenopathy. Skin:     General: Skin is warm and dry. Neurological:      General: No focal deficit present. Mental Status: She is alert and oriented to person, place, and time. Psychiatric:         Mood and Affect: Mood normal.         Behavior: Behavior normal.         Thought Content: Thought content normal.         Judgment: Judgment normal.       MAMMOGRAM/ULTRASOUND:    INDICATION:   Screening.       HISTORY:   The patient has no personal history of cancer.  The patient has the following family history of breast cancer:  mother, at age 35 and sister, at age 45.       MAMMOGRAM VIEWS:   The following mammographic views where obtained: bilateral craniocaudal; bilateral craniocaudal with tomosynthesis; bilateral mediolateral oblique; and bilateral mediolateral oblique with tomosynthesis       TOMOSYNTHESIS:   Tomosynthesis (3 Dimensional Breast Imaging) was used on this examination to aid in evaluation.       COMPARISON:   The present examination has been compared to prior imaging studies performed at [facility] on 07/12/2017 and 08/24/2018.       CAD:   This exam was reviewed using the Synac[employer] Computer Aided Detection (CAD)       TISSUE DENSITY:   The breasts are heterogeneously dense (Type 3 density).       MAMMOGRAM FINDINGS:   In the right breast, no suspicious masses, areas of suspicious architectural distortion, suspicious calcifications, or additional suspicious findings are identified.           Finding 1:   There is a

## 2020-03-03 ENCOUNTER — TELEPHONE (OUTPATIENT)
Dept: OBGYN | Age: 45
End: 2020-03-03

## 2020-03-03 NOTE — TELEPHONE ENCOUNTER
Patient returned call and informed of instruction as per litzy edouard cnp. Informed that she is not too old for depo provera bur being she is brca risk she would like patient to check with her breast specialist before staring the depo back up.  Patient states will do this with her breast specialist.

## 2020-03-03 NOTE — TELEPHONE ENCOUNTER
----- Message from [facility], NANCY - TANNER sent at 3/3/2020 10:10 AM EST -----  Regarding: FW: Depo  Patient is not \"too old\" to use depo. However, as she is high risk for BRCA, I recommend she discuss this with her breast specialist. Some studies indicate depo use can increase BRCA risk in higher risk women, but results are mixed. ----- Message -----  From: Saelna Pringle  Sent: 2/26/2020   1:06 PM EST  To: [facility], NANCY - TANNER  Subject: Depo                                             Patient called and wanted to know if she is able to re-start the Depo. Or is she too old to have it at 54 y.o.?

## 2020-04-29 ENCOUNTER — TELEPHONE (OUTPATIENT)
Dept: BREAST CENTER | Age: 45
End: 2020-04-29

## 2020-04-29 NOTE — TELEPHONE ENCOUNTER
MA received a call from patient that states she has thought about everything you spoke to her about at her appt on 2/21/2020. She states she would like to move forward now with the genetic counselor. MA advised patient she would let  know so she can place referral and I will send all information needed to the office and they will contact her to schedule appt. Pt verbalized understanding. MA routing message to  for further assistance. MA noticed stated Genetic testing I wasn't sure if that was for us to draw or since they do testing at counselor was just stating for them.         Electronically signed by Sully Liu MA on 4/29/20 at 10:19 AM EDT

## 2020-05-04 ENCOUNTER — VIRTUAL VISIT (OUTPATIENT)
Dept: INTERNAL MEDICINE | Age: 45
End: 2020-05-04
Payer: MEDICAID

## 2020-05-04 PROCEDURE — 99213 OFFICE O/P EST LOW 20 MIN: CPT | Performed by: INTERNAL MEDICINE

## 2020-05-04 RX ORDER — MECLIZINE HCL 12.5 MG/1
12.5 TABLET ORAL 3 TIMES DAILY PRN
Qty: 90 TABLET | Refills: 0 | Status: SHIPPED | OUTPATIENT
Start: 2020-05-04 | End: 2020-08-13 | Stop reason: SDUPTHER

## 2020-05-04 RX ORDER — AMOXICILLIN AND CLAVULANATE POTASSIUM 875; 125 MG/1; MG/1
1 TABLET, FILM COATED ORAL 2 TIMES DAILY WITH MEALS
Qty: 20 TABLET | Refills: 0 | Status: SHIPPED | OUTPATIENT
Start: 2020-05-04 | End: 2020-05-14

## 2020-05-04 RX ORDER — AMITRIPTYLINE HYDROCHLORIDE 150 MG/1
TABLET, FILM COATED ORAL
Qty: 90 TABLET | Refills: 3 | Status: SHIPPED | OUTPATIENT
Start: 2020-05-04 | End: 2020-08-19 | Stop reason: SDUPTHER

## 2020-05-04 RX ORDER — SUMATRIPTAN 100 MG/1
TABLET, FILM COATED ORAL
Qty: 20 TABLET | Refills: 0 | Status: SHIPPED | OUTPATIENT
Start: 2020-05-04 | End: 2020-08-13 | Stop reason: SDUPTHER

## 2020-05-04 ASSESSMENT — ENCOUNTER SYMPTOMS
EYE REDNESS: 0
SHORTNESS OF BREATH: 0
SORE THROAT: 0
SINUS PRESSURE: 1
ABDOMINAL PAIN: 0
EYE ITCHING: 1
NAUSEA: 0
COUGH: 0
SINUS PAIN: 1
DIARRHEA: 0

## 2020-05-04 ASSESSMENT — PATIENT HEALTH QUESTIONNAIRE - PHQ9
2. FEELING DOWN, DEPRESSED OR HOPELESS: 0
SUM OF ALL RESPONSES TO PHQ QUESTIONS 1-9: 0
SUM OF ALL RESPONSES TO PHQ9 QUESTIONS 1 & 2: 0
SUM OF ALL RESPONSES TO PHQ QUESTIONS 1-9: 0
1. LITTLE INTEREST OR PLEASURE IN DOING THINGS: 0

## 2020-05-04 NOTE — PATIENT INSTRUCTIONS
Take medications as prescribed  Contact clinic if no improvement in your symptoms after completing antibiotics

## 2020-05-04 NOTE — PROGRESS NOTES
TeleMedicine Patient Consent    This visit was performed as a virtual video visit using a synchronous, two-way, audio-video telehealth technology platform. Patient identification was verified at the start of the visit, including the patient's telephone number and physical location. I discussed with the patient the nature of our telehealth visits, that:     1. Due to the nature of an audio- video modality, the only components of a physical exam that could be done are the elements supported by direct observation. 2. I would evaluate the patient and recommend diagnostics and treatments based on my assessment. 3. If it was felt that the patient should be evaluated in clinic or an emergency room setting, then they would be directed there. 4. Our sessions are not being recorded and that personal health information is protected. 5. Our team would provide follow up care in person if/when the patient needs it. Patient does agree to proceed with telemedicine consultation. Patient's location: home address in [facility]  Physician  location other address in [facility]. Time spent: Greater than 15 minutes    This visit was completed virtually using Doxy. dayan Padilla [facility]  InternalMedicine Residency Program  [facility] Note      SUBJECTIVE:    Nazanin Fong presented to the [facility] for a routine visit for had concerns including Medication Refill and Sinus Problem. .     Patient complains today of sinus problem. She complains of sinus tenderness and pressure. She is also experiencing thick greenish nasal discharge, ongoing for 1. 5 months. She  Notes occasionally chills. Maximum temp of 99. She also complains of associated headaches. She has tried over the counter medications for her symptoms with minimal symptoms. Patient also complains of dizziness, she feels like the room is spinning. She has had these symptoms for several months. She denies any associated falls.      Review of Systems   HENT: Positive

## 2020-08-04 ASSESSMENT — ENCOUNTER SYMPTOMS
NAUSEA: 0
TROUBLE SWALLOWING: 0
BLOOD IN STOOL: 0
VOMITING: 0
VOICE CHANGE: 0
ABDOMINAL PAIN: 0
RHINORRHEA: 0
EYE DISCHARGE: 0
BACK PAIN: 0
SINUS PAIN: 0
DIARRHEA: 0
WHEEZING: 0
ABDOMINAL DISTENTION: 0
CHEST TIGHTNESS: 0
SORE THROAT: 0
SHORTNESS OF BREATH: 0
CONSTIPATION: 0
COUGH: 0
CHOKING: 0
EYE ITCHING: 0
SINUS PRESSURE: 0

## 2020-08-04 NOTE — PROGRESS NOTES
Subjective:  Bx 2020 PAS; TC 34%; Dr. Sandoval Class referred for high risk monitoring. Patient ID: Kacy Renee is a 54 y.o. female. HPI   54 y.o. female who presented with a mammogram at [facility] on 10/23/2019. The patient has not noted a change in BSE since presentation. Patient denies nipple discharge. Patient denies a personal history of breast cancer. Breast cancer risk factors include family hx on mother's side, family hx on father's side, mom with breast CA at age 29's (currently age 79). , Sister with breast CA at age 39 (currently 46). nulliparous, family hx of ovarian CA, family hx of colon CA and age and gender. Ashkenazi Anab[employer]ism Ancestry: No.     OBSTETRIC RELATED HISTORY:  Age of menarche was 14. Age of menopause :  Not yet    Patient denies hormonal therapy. Patient is . Is patient interested in fertility information about fertility preservation? No     CANCER SURVEILLANCE HISTORY:  Mammograms: Yes  Yearly  Breast MRI's: Yes Yearly  Breast Biopsies: Yes   Colonoscopy: No   GI Polyps: Not Applicable   EGD: No   Pelvic Exam: Yes  Pap Smear: Yes   Dermatology: No   Lung screening: no        Estimated body mass index is 39.45 kg/m² as calculated from the following:    Height as of this encounter: 5' (1.524 m). Weight as of this encounter: 202 lb (91.6 kg). Bra Size: 42B     -Mammogram findings: Focal asymmetry in the left breast requires additional evaluation, and ultrasound was recommended.  -Left breast ultrasound 10/23/2019: The finding in question is not seen on ultrasound; recommend breast MRI. -MRI bilateral breast on 2020:  Segmental non-mass enhancement is noted in the upper outer quadrant of the left breast which measures approximately 7.7 cm's in dimension; linear heterogeneous non-mass enhancement  Is identified in the upper outer quadrant of the right breast which measures 1.7 cm's.      -Biopsy on 2020 PATHOLOGY:  Diagnosis:  A.  Breast, left upper outer, biopsy:  Fibrocystic change and subtle usual ductal hyperplasia (UDH). Negative for malignancy. B. Breast, right upper outer, biopsy:  Benign breast tissue with foci of fibroadenomatoid hyperplasia and focal  pseudoangiomatous stromal hyperplasia ([address]). Negative for malignancy.     Past Medical History:   Diagnosis Date    Migraine 20 years    Sinusitis (chronic)      Past Surgical History:   Procedure Laterality Date    ADENOIDECTOMY      CHOLECYSTECTOMY  age 24   [facility] TONSILLECTOMY  age 1   [facility] WISDOM TOOTH EXTRACTION       Social History     Socioeconomic History    Marital status: Single     Spouse name: Not on file    Number of children: Not on file    Years of education: Not on file    Highest education level: Not on file   Occupational History    Not on file   Social Needs    Financial resource strain: Not on file    Food insecurity     Worry: Not on file     Inability: Not on file    Transportation needs     Medical: Not on file     Non-medical: Not on file   Tobacco Use    Smoking status: Never Smoker    Smokeless tobacco: Never Used   Substance and Sexual Activity    Alcohol use: No     Alcohol/week: 0.0 standard drinks    Drug use: No    Sexual activity: Never   Lifestyle    Physical activity     Days per week: Not on file     Minutes per session: Not on file    Stress: Not on file   Relationships    Social connections     Talks on phone: Not on file     Gets together: Not on file     Attends Congregation service: Not on file     Active member of club or organization: Not on file     Attends meetings of clubs or organizations: Not on file     Relationship status: Not on file    Intimate partner violence     Fear of current or ex partner: Not on file     Emotionally abused: Not on file     Physically abused: Not on file     Forced sexual activity: Not on file   Other Topics Concern    Not on file   Social History Narrative    Not on file     Allergies   Allergen Reactions    Latex Anaphylaxis, Hives and Rash    Iodine Hives    Trazodone And Nefazodone Nausea And Vomiting    Bactrim Hives    Iodides Hives    Aspirin Hives and Nausea And Vomiting    Tylenol With Codeine [Acetaminophen-Codeine] Nausea And Vomiting    Ultram [Tramadol Hcl] Nausea And Vomiting    Vicodin Tuss [Hydrocodone-Guaifenesin] Nausea And Vomiting     Current Outpatient Medications on File Prior to Visit   Medication Sig Dispense Refill    SUMAtriptan (IMITREX) 100 MG tablet Take one tablet  prn headache. May repeat in 2 hours. No more than 200 mg in 24 hours. Do not treat greater than 4 headaches in 30 days.  20 tablet 0    amitriptyline (ELAVIL) 150 MG tablet TAKE ONE TABLET BY MOUTH NIGHTLY 90 tablet 3    meclizine (ANTIVERT) 12.5 MG tablet Take 1 tablet by mouth 3 times daily as needed for Dizziness 90 tablet 0    cetirizine (ZYRTEC) 10 MG tablet Take 1 tablet by mouth daily 30 tablet 3    chlorpheniramine (EQ CHLORTABS) 4 MG tablet Take 1 tablet by mouth every 6 hours as needed for Allergies 120 tablet 3    hydrocortisone 2.5 % cream Apply to affected area 30 g 3    mineral oil-hydrophilic petrolatum (AQUAPHOR) ointment APPLY TOPICALLY AS NEEDED 52.5 g 3    diclofenac sodium 1 % GEL Apply 2 g topically 4 times daily 2 Tube 1    Psyllium (METAMUCIL) 28.3 % POWD Takes 1 teaspoon daily  otc med 1 Bottle 2    fluticasone (FLONASE) 50 MCG/ACT nasal spray 1 spray by Nasal route daily 1 Bottle 3    brompheniramine-pseudoephedrine-DM 30-2-10 MG/5ML syrup Take 10 mLs by mouth 4 times daily as needed for Congestion or Cough 120 mL 0    Ascorbic Acid (VITAMIN C) 500 MG tablet Take 500 mg by mouth daily      VITAMIN A PO Take 1 tablet by mouth daily      vitamin B-12 (CYANOCOBALAMIN) 100 MCG tablet Take 50 mcg by mouth daily      Biotin 1 MG CAPS Take 1 tablet by mouth daily      vitamin D3 (CHOLECALCIFEROL) 400 UNITS TABS tablet Take 400 Units by mouth daily      ferrous sulfate 325 (65 FE) MG tablet Take 650 mg by mouth daily (with breakfast)      Multiple Vitamins-Minerals (MULTIVITAMIN GUMMIES ADULT PO) Take 1 tablet by mouth daily      Misc. Devices MISC Please provide braces for carpal tunnel syndrome, bilateral. 2 Device 0    melatonin 3 MG TABS tablet Take 3 mg by mouth daily      DiphenhydrAMINE HCl (BENADRYL ALLERGY PO) Take  by mouth as needed. Current Facility-Administered Medications on File Prior to Visit   Medication Dose Route Frequency Provider Last Rate Last Dose    medroxyPROGESTERone (DEPO-PROVERA) injection 150 mg  150 mg Intramuscular Q3 Months CamillaNANCY Seay - CNM   150 mg at 04/03/18 2375     Review of Systems   Constitutional: Negative for activity change, appetite change, chills, fatigue, fever and unexpected weight change. Generally she feels. HENT: Positive for postnasal drip. Negative for congestion, rhinorrhea, sinus pressure, sinus pain, sore throat, trouble swallowing and voice change. Eyes: Negative for discharge, itching and visual disturbance. Respiratory: Negative for cough, choking, chest tightness, shortness of breath and wheezing. Hx asthma   Cardiovascular: Negative for chest pain, palpitations and leg swelling. Gastrointestinal: Negative for abdominal distention, abdominal pain, blood in stool, constipation, diarrhea, nausea and vomiting. Endocrine: Negative for cold intolerance and heat intolerance. Genitourinary: Negative for difficulty urinating, dysuria, frequency and hematuria. Musculoskeletal: Negative for arthralgias, back pain, gait problem, joint swelling, myalgias, neck pain and neck stiffness. Allergic/Immunologic: Negative for environmental allergies and food allergies. Neurological: Negative for dizziness, seizures, syncope, speech difficulty, weakness, light-headedness and headaches. Hematological: Negative for adenopathy. Does not bruise/bleed easily.    Psychiatric/Behavioral: Negative for agitation, confusion and decreased concentration. The patient is not nervous/anxious. Pleasant and conversant       Objective:   Physical Exam  Vitals signs and nursing note reviewed. Constitutional:       General: She is not in acute distress. Appearance: She is well-developed. She is not diaphoretic. Comments: ECOG 0   HENT:      Head: Normocephalic and atraumatic. Mouth/Throat:      Pharynx: No oropharyngeal exudate. Eyes:      General: No scleral icterus. Right eye: No discharge. Left eye: No discharge. Conjunctiva/sclera: Conjunctivae normal.   Neck:      Musculoskeletal: Normal range of motion and neck supple. Thyroid: No thyromegaly. Vascular: No JVD. Trachea: No tracheal deviation. Cardiovascular:      Rate and Rhythm: Normal rate and regular rhythm. Heart sounds: No murmur. No friction rub. No gallop. Pulmonary:      Effort: Pulmonary effort is normal. No respiratory distress or retractions. Breath sounds: Normal breath sounds. No stridor. No wheezing or rales. Chest:      Chest wall: No mass, lacerations, deformity, swelling, tenderness or edema. Breasts: Breasts are symmetrical.         Right: No inverted nipple, mass, nipple discharge, skin change or tenderness. Left: No inverted nipple, mass, nipple discharge, skin change or tenderness. Comments: Breasts are dense bilaterally. No skin dimpling or puckering. No nipple discharge. No clinically suspicious lumps nodules or masses appreciated. No axillary lymphadenopathy. Abdominal:      General: There is no distension. Palpations: Abdomen is soft. Tenderness: There is no abdominal tenderness. There is no guarding or rebound. Musculoskeletal: Normal range of motion. General: No tenderness or deformity. Right shoulder: Normal.      Left shoulder: Normal.   Lymphadenopathy:      Cervical: No cervical adenopathy.       Right cervical: No superficial, hyperplasia ([address] [place] [address] Drive). Negative for malignancy. HIGH RISK PROTOCOL:      During pt's visit today, a ShannanLisa Risk Evaluation was performed. Pt has a 34.4 % lifetime risk (to age [place] 3) of developing breast cancer (high risk). Per NCCN guidelines Version 1.2019 , the following recommendations were discussed:      Clinical encounter every 6-12 months (to begin when identified as high risk; not prior to age 24).  Referral to [facility].  Annual screening mammogram   o To begin 10 years prior to the youngest family member with breast cancer but not prior to age 27.  Annual breast MRI   o To begin 10 years prior to youngest family member with breast cancer but not prior to age 22.    -High risk screening:  Discussed MRI of breast and advised her that breast MRI has high sensitivity but the specificity is lower due to the overlap in the enhancement pattern of benign and malignant lesions. This may lead to false positive results and invasive testing and procedures that may be unnecessary (i.e., biopsy, excision, etc.). We reviewed potential risks of Gadolinium and that risks, if any, are not fully understood. Gadolinium is retained for months or years and brain, bone, and other organs. The FDA states there is no clinical evidence that directly links gadolinium retention to adverse effects in patients with normal kidney function. Nephrogenic systemic fibrosis has occurred in patients with impaired elimination of gadolinium based contrast agents. Higher than recommended dosing or repeated dosing appears to increase the risk. We reviewed that it's important to have blood work to ensure adequate kidney function prior to MRI. We discussed prior authorization does not cover the co-pay, and once authorized, she should contact her insurance company to clearly understand the financial impact of testing.     Anaphylactic and other hypersensitivity reactions with cardiovascular, respiratory or cutaneous

## 2020-08-11 ENCOUNTER — OFFICE VISIT (OUTPATIENT)
Dept: BREAST CENTER | Age: 45
End: 2020-08-11
Payer: MEDICAID

## 2020-08-11 VITALS
HEIGHT: 60 IN | SYSTOLIC BLOOD PRESSURE: 108 MMHG | RESPIRATION RATE: 18 BRPM | BODY MASS INDEX: 40.44 KG/M2 | TEMPERATURE: 97 F | HEART RATE: 99 BPM | DIASTOLIC BLOOD PRESSURE: 60 MMHG | OXYGEN SATURATION: 99 % | WEIGHT: 206 LBS

## 2020-08-11 PROCEDURE — 99214 OFFICE O/P EST MOD 30 MIN: CPT | Performed by: NURSE PRACTITIONER

## 2020-08-11 PROCEDURE — 99213 OFFICE O/P EST LOW 20 MIN: CPT | Performed by: NURSE PRACTITIONER

## 2020-08-13 ENCOUNTER — VIRTUAL VISIT (OUTPATIENT)
Dept: INTERNAL MEDICINE | Age: 45
End: 2020-08-13
Payer: MEDICAID

## 2020-08-13 PROCEDURE — 99442 PR PHYS/QHP TELEPHONE EVALUATION 11-20 MIN: CPT | Performed by: INTERNAL MEDICINE

## 2020-08-13 RX ORDER — MOMETASONE FUROATE 50 UG/1
SPRAY, METERED NASAL
Qty: 1 INHALER | Refills: 0 | Status: SHIPPED
Start: 2020-08-13 | End: 2020-08-19 | Stop reason: SDUPTHER

## 2020-08-13 RX ORDER — MECLIZINE HCL 12.5 MG/1
12.5 TABLET ORAL 3 TIMES DAILY PRN
Qty: 90 TABLET | Refills: 0 | Status: SHIPPED
Start: 2020-08-13 | End: 2020-08-19 | Stop reason: SDUPTHER

## 2020-08-13 RX ORDER — CETIRIZINE HYDROCHLORIDE 10 MG/1
10 TABLET ORAL DAILY
Qty: 30 TABLET | Refills: 3 | Status: SHIPPED
Start: 2020-08-13 | End: 2020-08-19 | Stop reason: SDUPTHER

## 2020-08-13 RX ORDER — SUMATRIPTAN 100 MG/1
TABLET, FILM COATED ORAL
Qty: 20 TABLET | Refills: 0 | Status: SHIPPED
Start: 2020-08-13 | End: 2020-08-19 | Stop reason: SDUPTHER

## 2020-08-13 NOTE — PROGRESS NOTES
[facility]  Internal Medicine Clinic    Attending Physician's Statement      TeleMedicine Patient Consent    This visit was performed as phone visit. Patient identification was verified at the start of the visit, including the patient's telephone number and physical location. I discussed with the patient the nature of our telehealth visits, that:     1. I would evaluate the patient and recommend diagnostics and treatments based on my assessment. 2. If it is felt that the patient should be evaluated in the clinic or an emergency room setting, then they would be directed there. 3. Our sessions are not being recorded and that personal health information is protected. 4. Our team would provide follow up care in person if/when the patient needs it. Patient does agree to proceed with telemedicine consultation. Patient's location: home address in [place]    I have discussed the case, including pertinent history with the medical resident. I agree with the assessment, plan and orders as documented by the resident. I have reviewed all the pertinent PMHx, PSHx, Family Hx, Social Hx, medications and allergies, and updated history as appropriate. Patient presents for telephone visit. 1. Chronic sinusitis, has been using decongestants a lot during the last 2 weeks, now with increased nasal congestion. Possible rhinitis medicamentosa. D/c decongestant, may have intranasal steroid. Follow up in 1 week. 2. Seasonal allergies      Pertinent lab results and imaging studies have been reviewed. Medical problems, assessment and plan per medical resident's note. Time spent: 11 - 20 mins      Thierry Marques. Herberth Carlos MD  Internal Medicine Residency Faculty  8/13/2020      The patient is being evaluated by a Virtual Visit (video visit) encounter to address concerns as mentioned above. A caregiver was present when appropriate.  Due to this being a TeleHealth encounter (During COVID-19 public health emergency), evaluation of the following organ systems was limited: Vitals/Constitutional/EENT/Resp/CV/GI//MS/Neuro/Skin/Heme-Lymph-Imm. Pursuant to the emergency declaration under the [address] [place] [address], [address] [place] [address] authority and the [employer] Act, this Virtual Visit was conducted with patient's (and/or legal guardian's) consent, to reduce the patient's risk of exposure to COVID-19 and provide necessary medical care. The patient (and/or legal guardian) has also been advised to contact this office for worsening conditions or problems, and seek emergency medical treatment and/or call 911 if deemed necessary. Services were provided through a video synchronous discussion virtually to substitute for in-person clinic visit. Patient and provider were located at their individual homes.

## 2020-08-13 NOTE — PROGRESS NOTES
Special Virtual Visit done per Dr. Chetan Ackerman  Patients questions were addressed and answered Printed AVS  was mailed to pt

## 2020-08-13 NOTE — PROGRESS NOTES
Radames Roberts is a 54 y.o. female evaluated via telephone on 8/13/2020. Consent:  She and/or health care decision maker is aware that that she may receive a bill for this telephone service, depending on her insurance coverage, and has provided verbal consent to proceed: Yes      Documentation:  I communicated with the patient and/or health care decision maker about visit  Details of this discussion including any medical advice provided: yes    She has PMH of Migraine on imitrex 100 mg PRN,  Eczema, seasonal alalergy Significant family hx of breast/ovarian/rectal ca. Chronic Rhinosinusitis   -reports increase nasal congestion, post nasal discharge, maxillary sinus tenderness, report worsening symptoms, denies fever, sob, cough. - she reports has been using sudofed nasal decongestant more often over last 2 week. - she use cetrizine and saline irrigation as well but denies using Flonase( reports make her taste bad)  - will advice to stop sudofed with concern for rhinitis medicamentosa, encourage to use cetrizine, intranasal steroid   -will follow up in 1 week for symptoms check and will refer to allergy clinic for evaluation for frequent rhinitis episode. Health Maintenance   -pap smear/HPV: normal(9/2019)  -mammogram:keft focal asymmetry>>US>MRI with tissue biopsy showed fibrocystic changes and ductal hyperplasia with negative for malignancy,per  advice genetic counseling and ?chemoprevention >>patient thinking about.  -Recommend mammogram with MRI q 6month next scan on (8/2020)  -colonoscopy: hx of mother CRC at age <60 years ,strongly recommend colonoscopy q 5 years >>refuse currently   -Tdap/Peumoovac:done  -Hba1c:5.7    I affirm this is a Patient Initiated Episode with a Patient who has not had a related appointment within my department in the past 7 days or scheduled within the next 24 hours.     Patient identification was verified at the start of the visit: Yes    Total Time: minutes: 11-20 minutes    Note: not billable if this call serves to triage the patient into an appointment for the relevant concern      Alivia Fields

## 2020-08-13 NOTE — PATIENT INSTRUCTIONS
Advice to stop Sudafed for now  Continue take cetrizine daily and started on mometasone nasal spray 1 spray both nostril twice a day  Follow up in 1 week in clinic

## 2020-08-14 ENCOUNTER — TELEPHONE (OUTPATIENT)
Dept: BREAST CENTER | Age: 45
End: 2020-08-14

## 2020-08-18 NOTE — TELEPHONE ENCOUNTER
Pt called and left message that she needs paper scripts mailed to her for her Elavil, Zyrtec, Imitrex, & Hydrocortisone. Called and spoke with patient via phone. States she now uses [facility] mail order [facility]. Pt notified of meds that were escripted on 20. Requested they all go to [facility]. Also asking for Elavil to be escripted. Pt notified that refill request for Hydrocortisone cream was denied. Pt ok with same. Called and spoke with [email or web address]. All orders canceled at [email or web address]. States they did not an active order for Elavil. Called new pharmacy to see if script from May had previously been mailed to them. States the script they had on file was from 2019 & is . Attempted to call patient back to see where script from 2020 was sent to and no answer noted. Pt returned call. States she does not remember getting a printed script for her Elavil after her 20 appt.

## 2020-08-19 RX ORDER — SUMATRIPTAN 100 MG/1
TABLET, FILM COATED ORAL
Qty: 20 TABLET | Refills: 0 | Status: SHIPPED
Start: 2020-08-19 | End: 2020-12-29 | Stop reason: SDUPTHER

## 2020-08-19 RX ORDER — AMITRIPTYLINE HYDROCHLORIDE 150 MG/1
TABLET, FILM COATED ORAL
Qty: 90 TABLET | Refills: 0 | Status: SHIPPED
Start: 2020-08-19 | End: 2020-12-29 | Stop reason: SDUPTHER

## 2020-08-19 RX ORDER — MOMETASONE FUROATE 50 UG/1
SPRAY, METERED NASAL
Qty: 3 INHALER | Refills: 0 | Status: SHIPPED
Start: 2020-08-19 | End: 2020-11-10

## 2020-08-19 RX ORDER — MECLIZINE HCL 12.5 MG/1
12.5 TABLET ORAL 3 TIMES DAILY PRN
Qty: 90 TABLET | Refills: 0 | Status: SHIPPED
Start: 2020-08-19 | End: 2020-11-10

## 2020-08-19 RX ORDER — CETIRIZINE HYDROCHLORIDE 10 MG/1
10 TABLET ORAL DAILY
Qty: 90 TABLET | Refills: 0 | Status: SHIPPED
Start: 2020-08-19 | End: 2021-02-11 | Stop reason: SDUPTHER

## 2020-10-07 ENCOUNTER — NURSE ONLY (OUTPATIENT)
Dept: INTERNAL MEDICINE | Age: 45
End: 2020-10-07
Payer: MEDICAID

## 2020-10-07 VITALS — TEMPERATURE: 97.7 F

## 2020-10-08 ENCOUNTER — TELEPHONE (OUTPATIENT)
Dept: BREAST CENTER | Age: 45
End: 2020-10-08

## 2020-10-19 ASSESSMENT — ENCOUNTER SYMPTOMS
SORE THROAT: 0
NAUSEA: 0
EYE ITCHING: 0
ABDOMINAL PAIN: 0
DIARRHEA: 0
BLOOD IN STOOL: 0
VOMITING: 0
EYE DISCHARGE: 0
CHEST TIGHTNESS: 0
CHOKING: 0
CONSTIPATION: 0
SHORTNESS OF BREATH: 0
BACK PAIN: 0
COUGH: 0
SINUS PAIN: 0
TROUBLE SWALLOWING: 0
WHEEZING: 0
RHINORRHEA: 0
ABDOMINAL DISTENTION: 0
SINUS PRESSURE: 0
VOICE CHANGE: 0

## 2020-10-19 NOTE — PROGRESS NOTES
Subjective:  Bx 2020 PASH; TC 34%; Dr. Dilan Mares referred for high risk monitoring. This note was copied forward from the last encounter. Essential components for this patient record were reviewed and verified on this visit including:  recent hospitalizations, recent imaging, PMH, PSH, FH, SOC HX, Allergies, and Medications were reviewed and updated as appropriate. In addition, the assessment and plan were copied from prior office note and updated accordingly. Patient ID: Adali Perez is a 54 y.o. female. HPI     54 y.o. female who presented with a mammogram at [facility] on 10/23/2019. The patient has not noted a change in BSE since presentation. Patient denies nipple discharge. Patient denies a personal history of breast cancer. Breast cancer risk factors include family hx on mother's side, family hx on father's side, mom with breast CA at age 29's and then again in her 42's (currently age 79). , Sister with breast CA at age 39 (currently 46). nulliparous, reported amily hx of ovarian CA, family hx of colon CA (reported in her mother) and age and gender. Ashkenazi Faith Ancestry: No.     OBSTETRIC RELATED HISTORY:  Age of menarche was 14. Age of menopause :  Not yet    Patient denies hormonal therapy. Patient is . Is patient interested in fertility information about fertility preservation? No     CANCER SURVEILLANCE HISTORY:  Mammograms: Yes  Yearly  Breast MRI's: Yes Yearly  Breast Biopsies: Yes   Colonoscopy: No   GI Polyps: Not Applicable   EGD: No   Pelvic Exam: Yes  Pap Smear: Yes   Dermatology: No   Lung screening: no        Estimated body mass index is 39.45 kg/m² as calculated from the following:    Height as of this encounter: 5' (1.524 m). Weight as of this encounter: 202 lb (91.6 kg). Bra Size: 42B     -Mammogram findings: Focal asymmetry in the left breast requires additional evaluation, and ultrasound was recommended.  -Left breast ultrasound 10/23/2019:  The finding in question is not seen on ultrasound; recommend breast MRI. -MRI bilateral breast on 12/19/2020:  Segmental non-mass enhancement is noted in the upper outer quadrant of the left breast which measures approximately 7.7 cm's in dimension; linear heterogeneous non-mass enhancement  Is identified in the upper outer quadrant of the right breast which measures 1.7 cm's.      -Biopsy on 01/09/2020 PATHOLOGY:  Diagnosis:  A. Breast, left upper outer, biopsy:  Fibrocystic change and subtle usual ductal hyperplasia (UDH). Negative for malignancy. B. Breast, right upper outer, biopsy:  Benign breast tissue with foci of fibroadenomatoid hyperplasia and focal  pseudoangiomatous stromal hyperplasia ([address] [place] [address] Drive). Negative for malignancy.     Past Medical History:   Diagnosis Date    Migraine 20 years    Sinusitis (chronic)      Past Surgical History:   Procedure Laterality Date    ADENOIDECTOMY      CHOLECYSTECTOMY  age 24   Radha Her TONSILLECTOMY  age 1   Radha Her WISDOM TOOTH EXTRACTION       Social History     Socioeconomic History    Marital status: Single     Spouse name: Not on file    Number of children: Not on file    Years of education: Not on file    Highest education level: Not on file   Occupational History    Not on file   Social Needs    Financial resource strain: Not on file    Food insecurity     Worry: Not on file     Inability: Not on file    Transportation needs     Medical: Not on file     Non-medical: Not on file   Tobacco Use    Smoking status: Never Smoker    Smokeless tobacco: Never Used   Substance and Sexual Activity    Alcohol use: No     Alcohol/week: 0.0 standard drinks    Drug use: No    Sexual activity: Not Currently     Partners: Male   Lifestyle    Physical activity     Days per week: Not on file     Minutes per session: Not on file    Stress: Not on file   Relationships    Social connections     Talks on phone: Not on file     Gets together: Not on file     Attends [employer] service: Not on fluticasone (FLONASE) 50 MCG/ACT nasal spray 1 spray by Nasal route daily (Patient not taking: Reported on 8/13/2020) 1 Bottle 3    brompheniramine-pseudoephedrine-DM 30-2-10 MG/5ML syrup Take 10 mLs by mouth 4 times daily as needed for Congestion or Cough (Patient not taking: Reported on 8/13/2020) 120 mL 0    Ascorbic Acid (VITAMIN C) 500 MG tablet Take 500 mg by mouth daily      VITAMIN A PO Take 1 tablet by mouth daily      vitamin B-12 (CYANOCOBALAMIN) 100 MCG tablet Take 50 mcg by mouth daily      Biotin 1 MG CAPS Take 1 tablet by mouth daily      vitamin D3 (CHOLECALCIFEROL) 400 UNITS TABS tablet Take 400 Units by mouth daily      ferrous sulfate 325 (65 FE) MG tablet Take 650 mg by mouth daily (with breakfast)      Multiple Vitamins-Minerals (MULTIVITAMIN GUMMIES ADULT PO) Take 1 tablet by mouth daily      Misc. Devices MISC Please provide braces for carpal tunnel syndrome, bilateral. (Patient not taking: Reported on 8/25/2020) 2 Device 0    melatonin 3 MG TABS tablet Take 3 mg by mouth daily      DiphenhydrAMINE HCl (BENADRYL ALLERGY PO) Take  by mouth as needed. Current Facility-Administered Medications on File Prior to Visit   Medication Dose Route Frequency Provider Last Rate Last Dose    medroxyPROGESTERone (DEPO-PROVERA) injection 150 mg  150 mg Intramuscular Q3 Months Nile KayserNANCY - CNM   150 mg at 04/03/18 1555     Review of Systems   Constitutional: Negative for activity change, appetite change, chills, fatigue, fever and unexpected weight change. Generally she feels. HENT: Negative for congestion, postnasal drip, rhinorrhea, sinus pressure, sinus pain, sore throat, trouble swallowing and voice change. Eyes: Negative for discharge, itching and visual disturbance. Respiratory: Negative for cough, choking, chest tightness, shortness of breath and wheezing. Hx asthma   Cardiovascular: Negative for chest pain, palpitations and leg swelling. inverted nipple, mass, nipple discharge, skin change or tenderness. Left: No inverted nipple, mass, nipple discharge, skin change or tenderness. Comments: Breasts are dense bilaterally. Overall her breast exam remains stable with no skin dimpling or puckering. No nipple discharge. No clinically suspicious lumps nodules or masses appreciated. No axillary lymphadenopathy. Abdominal:      General: There is no distension. Palpations: Abdomen is soft. Tenderness: There is no abdominal tenderness. There is no guarding or rebound. Musculoskeletal: Normal range of motion. General: No tenderness or deformity. Right shoulder: Normal.      Left shoulder: Normal.   Lymphadenopathy:      Cervical: No cervical adenopathy. Right cervical: No superficial, deep or posterior cervical adenopathy. Left cervical: No superficial, deep or posterior cervical adenopathy. Upper Body:      Right upper body: No pectoral adenopathy. Left upper body: No pectoral adenopathy. Skin:     General: Skin is warm and dry. Coloration: Skin is not pale. Findings: No erythema or rash. Neurological:      Mental Status: She is alert and oriented to person, place, and time. Coordination: Coordination normal.   Psychiatric:         Behavior: Behavior normal.         Thought Content: Thought content normal.         Judgment: Judgment normal.         Assessment:     54 y.o. pleasant female who's risk for breast cancer include gender, nulliparous and a family history that includes: Mom with breast cancer at age 29's currently age 79  Sister with breast cancer at age 39; currently age 46  Hx on father's side (Paternal aunts; unsure of age and # of aunts affected). [place]-Rectal cancer in her mother, age unknown. She reports  Her mother also had uterine cancer.   There are several members of her family from whom she is estranged; family history difficult to thoroughly assess. She was initially seen for an abnormal mammogram that was done on 10/23/2019.  -Mammogram findings: Focal asymmetry in the left breast requires additional evaluation, and ultrasound was recommended.  -Left breast ultrasound 10/23/2019: The finding in question is not seen on ultrasound; recommend breast MRI. -MRI bilateral breast on 12/19/2020:  Segmental non-mass enhancement is noted in the upper outer quadrant of the left breast which measures approximately 7.7 cm's in dimension; linear heterogeneous non-mass enhancement  Is identified in the upper outer quadrant of the right breast which measures 1.7 cm's.      -Biopsy on 01/09/2020 PATHOLOGY:  Diagnosis:  A. Breast, left upper outer, biopsy:  Fibrocystic change and subtle usual ductal hyperplasia (UDH). Negative for malignancy. B. Breast, right upper outer, biopsy:  Benign breast tissue with foci of fibroadenomatoid hyperplasia and focal  pseudoangiomatous stromal hyperplasia ([address] [place] [address] Drive). Negative for malignancy. HIGH RISK SCREENING PER PROTOCOL:    -B/L screening mammogram today, 11/10/2020:  Negative, BI-RADS 1.    -Genetic Testing, CancerNext test with RNA testin drawn on 10/07/2020: Of note, her mom plans to proceed with Genetic testing.    -Chemoprevention:  We reviewed that she may be eligible for chemoprevention with tamoxifen. Discussed that Tamoxifen 20 mg per day for 5 years was shown to reduce risk of breast cancer by 49%. Among women with a history of atypical hyperplasia, this dose and duration of tamoxifen was associated with an 86% reduction in breast cancer risk    Side effects of Tamoxifen again reviewed: including but not limited to hot flashes, mood changes, thrombophlebitis, thromboembolic disorders that can have life-threatening consequences, including death. We also discussed potential for endometrial cancer and the need for routine gynecological exams, at least annually.       There have been reports of Tamoxifen causing vaginal bleeding, birth defects and fetal loss in pregnant women. Tamoxifen use during pregnancy may have a potential long term risk to the fetus of a LLUVIA-like syndrome. For sexually-active women of childbearing age, initiate during menstruation (negative ? -hCG immediately prior to initiation in women with irregular cycles). Tamoxifen may induce ovulation. Barrier or nonhormonal contraceptives are recommended. Pregnancy should be avoided during treatment and for 2 months after treatment has been discontinued. Whenever possible, discontinue 2-3 weeks prior to elective surgery associated with an increased risk of thromboembolic events, or during periods of extended immobilization. Written information from \"Up To Date\" on Tamoxifen and breast cancer prevention provided. Continues to have menstrual cycles, however she reports they are irregular.    -Denies history of DVT/PE    Clinically, her breast exam remains stable and unremarkable. We reviewed her imaging today, she was advised that we are awaiting radiology review. We discussed that I cannot guarantee that she does not have breast cancer now; nor can I guarantee that she won't develop breast cancer in the future. However, there were no concerning findings identified on this visit. We reviewed healthy lifestyle, avoiding alcohol, and BSE in detail. Plan:   1. Continue monthly breast self examination; detailed instructions reviewed today. Bring any changes to your physician's attention. 2. Continue healthy diet and exercise routinely as tolerated. 3. Avoid alcohol or limit alcohol intake to < 3 drinks per week. 4. Continue follow up with Primary Care and Gynecology. 5. Wear a good, supportive bra at all times. 6. Repeat Bilateral mammogram 11/11/2021 (not ordered). 7. Repeat MRI bilateral breast May 2021 (ordered)   8. RTC May with MRI bilateral breasts prior.      9. She will review information on tamoxifen and call us if she would like to proceed. Will need to reinforce importance of avoiding pregnancy while on tamoxifen if she decides to go that route. 10. Colonoscopy advised due to [facility] in her mother; Refer back to Dr. Desiree Mae for screening colonoscopy. During today's visit, face-to-face time 25 minutes, greater than 50% in counseling education and coordination of care. All questions were answered to her apparent satisfaction, and she is agreeable to the plan as outlined above. Claudette Gage, RN, MSN, APRN-CNP, [address]  Advanced Oncology Certified Nurse Practitioner  Department of Breast Surgery  [facility] [place]/  [facility] in collaboration with Dr. Kenyon Callander.  [place]/Dr. Elisabeth Whalen  November 10, 2020

## 2020-10-19 NOTE — PROGRESS NOTES
Subjective:      Patient ID: Tonya Pina is a 54 y.o. female.     HPI    Review of Systems    Objective:   Physical Exam    Assessment:      ***      Plan:      ***

## 2020-11-10 ENCOUNTER — HOSPITAL ENCOUNTER (OUTPATIENT)
Dept: GENERAL RADIOLOGY | Age: 45
Discharge: HOME | End: 2020-11-12
Payer: MEDICAID

## 2020-11-10 ENCOUNTER — OFFICE VISIT (OUTPATIENT)
Dept: BREAST CENTER | Age: 45
End: 2020-11-10
Payer: MEDICAID

## 2020-11-10 VITALS
HEART RATE: 86 BPM | WEIGHT: 195 LBS | OXYGEN SATURATION: 98 % | TEMPERATURE: 97 F | HEIGHT: 60 IN | BODY MASS INDEX: 38.28 KG/M2 | SYSTOLIC BLOOD PRESSURE: 126 MMHG | DIASTOLIC BLOOD PRESSURE: 78 MMHG | RESPIRATION RATE: 20 BRPM

## 2020-11-10 PROCEDURE — G8482 FLU IMMUNIZE ORDER/ADMIN: HCPCS | Performed by: NURSE PRACTITIONER

## 2020-11-10 PROCEDURE — 1036F TOBACCO NON-USER: CPT | Performed by: NURSE PRACTITIONER

## 2020-11-10 PROCEDURE — 99213 OFFICE O/P EST LOW 20 MIN: CPT | Performed by: NURSE PRACTITIONER

## 2020-11-10 PROCEDURE — 77063 BREAST TOMOSYNTHESIS BI: CPT

## 2020-11-10 PROCEDURE — 99214 OFFICE O/P EST MOD 30 MIN: CPT | Performed by: NURSE PRACTITIONER

## 2020-11-10 PROCEDURE — G8417 CALC BMI ABV UP PARAM F/U: HCPCS | Performed by: NURSE PRACTITIONER

## 2020-11-10 PROCEDURE — G8427 DOCREV CUR MEDS BY ELIG CLIN: HCPCS | Performed by: NURSE PRACTITIONER

## 2020-11-10 NOTE — PATIENT INSTRUCTIONS
RELEASE OF RESULTS AND RECORDS  As of October 1, 2020, all results (x-ray reports, labwork, pathology reports) will be released through [address] in real time per federal law. Once your test results are final, they will be automatically released to your electronic medical record [facility] where you will be able to see those results and any clinical notes associated with that result. In some cases, you may see those results and notes before your provider or the staff have had a chance to review. We will make every effort to contact you, especially about any abnormal or confusing results. If you view a result before we have contacted you, please wait up to one business day for us to reach you before calling with questions. If anything in your notes seems inaccurate, please message us through [facility] with potential corrections. If you see a term or language in your clinical note that doesn't make sense, please use the online health library reference tool in your MyChart (under the Health tab)  to help you interpret the note.       Patient will be scheduled for breast MRI in 6 months --- Our office will contact patient    Referral to Dr Rodrigo Sanz will be placed -- her office staff will notify patient

## 2020-11-11 ENCOUNTER — TELEPHONE (OUTPATIENT)
Dept: SURGERY | Age: 45
End: 2020-11-11

## 2020-11-11 NOTE — TELEPHONE ENCOUNTER
MA attempted to contact patient to schedule appointment based on referral. Patient did not answer so MA left message requesting return call to schedule.      Electronically signed by Ericka Graves on 11/11/20 at 10:55 AM EST

## 2020-12-29 RX ORDER — SUMATRIPTAN 100 MG/1
TABLET, FILM COATED ORAL
Qty: 20 TABLET | Refills: 0 | Status: SHIPPED
Start: 2020-12-29 | End: 2021-01-18 | Stop reason: SDUPTHER

## 2020-12-29 RX ORDER — AMITRIPTYLINE HYDROCHLORIDE 150 MG/1
TABLET, FILM COATED ORAL
Qty: 90 TABLET | Refills: 0 | Status: SHIPPED
Start: 2020-12-29 | End: 2021-01-18 | Stop reason: SDUPTHER

## 2021-01-07 ENCOUNTER — OFFICE VISIT (OUTPATIENT)
Dept: PRIMARY CARE CLINIC | Age: 46
End: 2021-01-07
Payer: MEDICAID

## 2021-01-07 VITALS
TEMPERATURE: 98.5 F | OXYGEN SATURATION: 99 % | DIASTOLIC BLOOD PRESSURE: 87 MMHG | SYSTOLIC BLOOD PRESSURE: 121 MMHG | HEART RATE: 84 BPM

## 2021-01-07 DIAGNOSIS — Z20.822 SUSPECTED COVID-19 VIRUS INFECTION: Primary | ICD-10-CM

## 2021-01-07 DIAGNOSIS — J32.9 SINUSITIS, UNSPECIFIED CHRONICITY, UNSPECIFIED LOCATION: ICD-10-CM

## 2021-01-07 DIAGNOSIS — Z20.822 SUSPECTED COVID-19 VIRUS INFECTION: ICD-10-CM

## 2021-01-07 LAB
Lab: NORMAL
QC PASS/FAIL: NORMAL
SARS-COV-2, POC: NORMAL

## 2021-01-07 PROCEDURE — 1036F TOBACCO NON-USER: CPT | Performed by: FAMILY MEDICINE

## 2021-01-07 PROCEDURE — 99213 OFFICE O/P EST LOW 20 MIN: CPT | Performed by: FAMILY MEDICINE

## 2021-01-07 PROCEDURE — G8482 FLU IMMUNIZE ORDER/ADMIN: HCPCS | Performed by: FAMILY MEDICINE

## 2021-01-07 PROCEDURE — 87426 SARSCOV CORONAVIRUS AG IA: CPT | Performed by: FAMILY MEDICINE

## 2021-01-07 PROCEDURE — G8427 DOCREV CUR MEDS BY ELIG CLIN: HCPCS | Performed by: FAMILY MEDICINE

## 2021-01-07 PROCEDURE — G8417 CALC BMI ABV UP PARAM F/U: HCPCS | Performed by: FAMILY MEDICINE

## 2021-01-07 RX ORDER — DOXYCYCLINE HYCLATE 100 MG
100 TABLET ORAL 2 TIMES DAILY WITH MEALS
Qty: 20 TABLET | Refills: 0 | Status: SHIPPED | OUTPATIENT
Start: 2021-01-07 | End: 2021-01-17

## 2021-01-07 ASSESSMENT — ENCOUNTER SYMPTOMS
NAUSEA: 0
SORE THROAT: 1
SINUS PAIN: 0
ABDOMINAL PAIN: 0
SINUS PRESSURE: 0
SHORTNESS OF BREATH: 0
COUGH: 1

## 2021-01-07 NOTE — PROGRESS NOTES
Padma Zhu  : 1975    Chief Complaint:     Chief Complaint   Patient presents with    Headache    Nasal Congestion    Cough    Other     sinus drainage    Ear Fullness     bilateral, more the left then the right       HPI  Padma Zhu 54 y.o. presents for   Chief Complaint   Patient presents with    Headache    Nasal Congestion    Cough    Other     sinus drainage    Ear Fullness     bilateral, more the left then the right     Sinus Pain  Patient complains of congestion, cough, headaches, nasal congestion, post nasal drip, sinus pressure and sore throat. Onset of symptoms was 2 weeks ago. Symptoms have been unchanged since that time. She is drinking plenty of fluids. Past history is significant for chronic sinusitis. Patient is non-smoker. All questions were answered to patients satisfaction.     Past Medical History:   Diagnosis Date    Migraine 20 years    Sinusitis (chronic)        Past Surgical History:   Procedure Laterality Date    ADENOIDECTOMY      CHOLECYSTECTOMY  age 24   [facility]  age 1   [facility]         Social History     Socioeconomic History    Marital status: Single     Spouse name: Not on file    Number of children: Not on file    Years of education: Not on file    Highest education level: Not on file   Occupational History    Not on file   Social Needs    Financial resource strain: Not on file    Food insecurity     Worry: Not on file     Inability: Not on file    Transportation needs     Medical: Not on file     Non-medical: Not on file   Tobacco Use    Smoking status: Never Smoker    Smokeless tobacco: Never Used   Substance and Sexual Activity    Alcohol use: No     Alcohol/week: 0.0 standard drinks    Drug use: No    Sexual activity: Not Currently     Partners: Male   Lifestyle    Physical activity     Days per week: Not on file     Minutes per session: Not on file    Stress: Not on file   Relationships  Social connections     Talks on phone: Not on file     Gets together: Not on file     Attends Mandaeism service: Not on file     Active member of club or organization: Not on file     Attends meetings of clubs or organizations: Not on file     Relationship status: Not on file    Intimate partner violence     Fear of current or ex partner: Not on file     Emotionally abused: Not on file     Physically abused: Not on file     Forced sexual activity: Not on file   Other Topics Concern    Not on file   Social History Narrative    Not on file       Family History   Problem Relation Age of Onset    Cancer Mother 29        breast, bilateral, more than once; gastric cancer    Heart Disease Mother     Asthma Mother     Stroke Mother     Migraines Mother     Arthritis Mother     Colon Cancer Mother 27        rectal cancer    High Blood Pressure Father     Cancer Father         prostate    Migraines Father     Cancer Sister 29        breast and ovarian    Stroke Sister     Breast Cancer Maternal Grandmother     Cancer Paternal Grandmother         breast    Cancer Paternal Grandfather         prostate    Migraines Sister     Breast Cancer Maternal Aunt     Cancer Maternal Aunt         ovarian cancer    Colon Cancer Maternal Aunt     Breast Cancer Paternal Aunt     Prostate Cancer Paternal Uncle     Breast Cancer Maternal Cousin         under age of 48    Cancer Maternal Cousin         ovarian    Breast Cancer Maternal Aunt     Cancer Maternal Aunt         ovarian    Colon Cancer Maternal Aunt     Breast Cancer Maternal Aunt     Breast Cancer Paternal Great Grandmother     Breast Cancer Maternal Great Grandmother     Breast Cancer Paternal Aunt     Breast Cancer Maternal Cousin         under age of 48   [id number] Maternal Cousin         under age of 48   [address] Maternal Cousin         under age of 48    Breast Cancer Maternal Cousin         under age of 48 Current Outpatient Medications   Medication Sig Dispense Refill    doxycycline hyclate (VIBRA-TABS) 100 MG tablet Take 1 tablet by mouth 2 times daily (with meals) for 10 days 20 tablet 0    SUMAtriptan (IMITREX) 100 MG tablet Take one tablet  prn headache. May repeat in 2 hours. No more than 200 mg in 24 hours. Do not treat greater than 4 headaches in 30 days. 20 tablet 0    amitriptyline (ELAVIL) 150 MG tablet TAKE ONE TABLET BY MOUTH NIGHTLY 90 tablet 0    cetirizine (ZYRTEC) 10 MG tablet Take 1 tablet by mouth daily 90 tablet 0    chlorpheniramine (EQ CHLORTABS) 4 MG tablet Take 1 tablet by mouth every 6 hours as needed for Allergies 120 tablet 3    mineral oil-hydrophilic petrolatum (AQUAPHOR) ointment APPLY TOPICALLY AS NEEDED 52.5 g 3    diclofenac sodium 1 % GEL Apply 2 g topically 4 times daily 2 Tube 1    brompheniramine-pseudoephedrine-DM 30-2-10 MG/5ML syrup Take 10 mLs by mouth 4 times daily as needed for Congestion or Cough 120 mL 0    Ascorbic Acid (VITAMIN C) 500 MG tablet Take 500 mg by mouth daily      VITAMIN A PO Take 1 tablet by mouth daily      vitamin B-12 (CYANOCOBALAMIN) 100 MCG tablet Take 50 mcg by mouth daily      Biotin 1 MG CAPS Take 1 tablet by mouth daily      vitamin D3 (CHOLECALCIFEROL) 400 UNITS TABS tablet Take 400 Units by mouth daily      ferrous sulfate 325 (65 FE) MG tablet Take 650 mg by mouth daily (with breakfast)      Multiple Vitamins-Minerals (MULTIVITAMIN GUMMIES ADULT PO) Take 1 tablet by mouth daily      Misc. Devices MISC Please provide braces for carpal tunnel syndrome, bilateral. (Patient not taking: Reported on 8/25/2020) 2 Device 0    melatonin 3 MG TABS tablet Take 3 mg by mouth daily      DiphenhydrAMINE HCl (BENADRYL ALLERGY PO) Take  by mouth as needed.        Current Facility-Administered Medications   Medication Dose Route Frequency Provider Last Rate Last Admin  medroxyPROGESTERone (DEPO-PROVERA) injection 150 mg  150 mg Intramuscular Q3 Months NANCY Dow - CNM   150 mg at 04/03/18 6225       Allergies   Allergen Reactions    Latex Anaphylaxis, Hives and Rash    Iodine Hives    Trazodone And Nefazodone Nausea And Vomiting    Bactrim Hives    Iodides Hives    Aspirin Hives and Nausea And Vomiting    Tylenol With Codeine [Acetaminophen-Codeine] Nausea And Vomiting    Ultram [Tramadol Hcl] Nausea And Vomiting    Vicodin Tuss [Hydrocodone-Guaifenesin] Nausea And Vomiting       Health Maintenance Due   Topic Date Due    Hepatitis C screen  1975           REVIEW OF SYSTEMS  Review of Systems   Constitutional: Positive for chills and fatigue. Negative for fever. HENT: Positive for congestion, postnasal drip and sore throat. Negative for ear pain, sinus pressure and sinus pain. Respiratory: Positive for cough. Negative for shortness of breath. Cardiovascular: Negative for chest pain. Gastrointestinal: Negative for abdominal pain and nausea. Genitourinary: Negative for frequency. Musculoskeletal: Negative for arthralgias and myalgias. Skin: Negative for rash. Neurological: Negative for dizziness. Hematological: Negative for adenopathy. Psychiatric/Behavioral: Negative for decreased concentration and sleep disturbance. PHYSICAL EXAM  /87 (Site: Left Lower Arm, Position: Sitting, Cuff Size: Large Adult)   Pulse 84   Temp 98.5 °F (36.9 °C) (Oral)   SpO2 99%   Physical Exam  Vitals signs and nursing note reviewed. Constitutional:       Appearance: Normal appearance. She is normal weight. HENT:      Head: Normocephalic and atraumatic. Right Ear: Tympanic membrane, ear canal and external ear normal.      Left Ear: Tympanic membrane, ear canal and external ear normal.      Nose: Congestion present. Comments: PND     Mouth/Throat:      Pharynx: Posterior oropharyngeal erythema present.    Eyes: Extraocular Movements: Extraocular movements intact. Conjunctiva/sclera: Conjunctivae normal.   Neck:      Musculoskeletal: Normal range of motion. Cardiovascular:      Rate and Rhythm: Normal rate and regular rhythm. Pulses: Normal pulses. Pulmonary:      Effort: Pulmonary effort is normal.      Breath sounds: Normal breath sounds. No wheezing, rhonchi or rales. Chest:      Chest wall: No tenderness. Abdominal:      Palpations: Abdomen is soft. Tenderness: There is no abdominal tenderness. Lymphadenopathy:      Cervical: No cervical adenopathy. Skin:     General: Skin is warm and dry. Neurological:      General: No focal deficit present. Mental Status: She is alert and oriented to person, place, and time. Psychiatric:         Mood and Affect: Mood normal.         Behavior: Behavior normal.              Laboratory:   All laboratory and radiology results have been personally reviewed by myself    Lab Results   Component Value Date     05/24/2019    K 4.1 05/24/2019     05/24/2019    CO2 25 05/24/2019    BUN 7 05/24/2019    CREATININE 0.9 05/24/2019    PROT 7.9 05/24/2019    LABALBU 4.7 05/24/2019    CALCIUM 9.6 05/24/2019    GFRAA >60 05/24/2019    LABGLOM >60 05/24/2019    GLUCOSE 108 05/24/2019    GLUCOSE 85 03/13/2012    AST 22 05/24/2019    ALT 24 05/24/2019    ALKPHOS 72 05/24/2019    BILITOT 0.4 05/24/2019    TSH 1.220 12/05/2013    CHOL 168 07/19/2016    TRIG 87 07/19/2016    HDL 45 07/19/2016    LDLCALC 106 07/19/2016    LABA1C 5.4 06/07/2018        Lab Results   Component Value Date    CHOL 168 07/19/2016     Lab Results   Component Value Date    TRIG 87 07/19/2016     Lab Results   Component Value Date    HDL 45 07/19/2016     Lab Results   Component Value Date    LDLCALC 106 (H) 07/19/2016       Lab Results   Component Value Date    LABA1C 5.4 06/07/2018    LABA1C 5.7 10/20/2016     Lab Results   Component Value Date    LDLCALC 106 (H) 07/19/2016 CREATININE 0.9 05/24/2019       ASSESSMENT/PLAN:     Diagnosis Orders   1. Suspected COVID-19 virus infection  POCT COVID-19, Antigen    COVID-19 Ambulatory   2. Sinusitis, unspecified chronicity, unspecified location       Rapid negative, will send out pcr. Due to chronic sinus issues, recommend to start doxy as she has tolerated this in the past. Side effects of medication were reviewed with patient. COVID-19 swab obtained and pending, will call with results once available. Advised strict self-quarantine at home in the interim. Work excuse provided to patient today. Increase fluids. Rest as able, but take opportunities to ambulate frequently. Symptomatic relief discussed including Tylenol as directed prn pain/fever. Schedule f/u with PCP in 7-10 days if symptoms persist. ED sooner if symptoms worsen or change. ED immediately with high or refractory fever, progressive SOB, dyspnea, CP, calf pain/swelling, shaking chills, vomiting, abdominal pain, lethargy, flank pain, or decreased urinary output. Pt verbalizes understanding and is in agreement with plan of care. All questions answered. Problem list reviewed andsimplified/updated  HM reviewed today and counseled as appropriate    Call or go to ED immediately if symptoms worsen or persist.  Future Appointments   Date Time Provider Kristina Abby   1/18/2021  2:00 PM Italo Mccartney MD [facility]   2/4/2021  8:30 AM Kathie Benítez MD [address] [place] [address]     Or sooner if necessary.       Educational materials and/or homeexercises printed for patient's review and were included in patient instructions on his/her After Visit Summary and given to patient at the end of visit.       Counseled regarding above diagnosis, including possible risks and complications,  especially if left uncontrolled.    Counseled regarding the possible side effects, risks, benefits and alternatives to treatment; patient and/or guardian verbalizes understanding, agrees,feels comfortable with and wishes to proceed with above treatment plan.     Advised patient to call Robert Rosado new medication issues, and read all Rx info from pharmacy to assure aware of all possible risks and side effects of medication before taking.     Reviewed age and gender appropriate health screening exams and vaccinations. Advised patient regarding importance of keeping up with recommended health maintenance and toschedule as soon as possible if overdue, as this is important in assessing for undiagnosed pathology, especially cancer, as well as protecting against potentially harmful/life threatening disease.          Patient and/or guardian verbalizes understanding and agrees with above counseling, assessment and plan.     All questions answered. Rula Ghotra, DO  1/7/21    NOTE: This report was transcribed using voice recognition software.  Every effort was made to ensure accuracy; however, inadvertent computerized transcription errors may be present

## 2021-01-09 LAB
SARS-COV-2: NOT DETECTED
SOURCE: NORMAL

## 2021-01-14 ENCOUNTER — TELEPHONE (OUTPATIENT)
Dept: SURGERY | Age: 46
End: 2021-01-14

## 2021-01-14 NOTE — TELEPHONE ENCOUNTER
MA contacted patient to schedule appointment for colonoscopy consult with Dr Donnell Ott based on referral by BRYANNA Lopez. Patient scheduled for appointment on 2/22/21 @ 8:30am with Dr Donnell Ott in [place]. Patient informed of location and what to bring to appointment. Appointment letter mailed to patient.      Electronically signed by Breanna Escobar on 1/14/21 at 9:41 AM EST

## 2021-01-18 ENCOUNTER — VIRTUAL VISIT (OUTPATIENT)
Dept: INTERNAL MEDICINE | Age: 46
End: 2021-01-18
Payer: MEDICAID

## 2021-01-18 DIAGNOSIS — G43.109 MIGRAINE WITH AURA AND WITHOUT STATUS MIGRAINOSUS, NOT INTRACTABLE: Chronic | ICD-10-CM

## 2021-01-18 PROBLEM — Z80.0 FAMILY HISTORY OF COLON CANCER: Status: ACTIVE | Noted: 2020-10-07

## 2021-01-18 PROBLEM — Z80.3 FAMILY HISTORY OF BREAST CANCER: Status: ACTIVE | Noted: 2020-10-07

## 2021-01-18 PROBLEM — Z98.890 HISTORY OF BENIGN BREAST BIOPSY: Status: ACTIVE | Noted: 2020-10-07

## 2021-01-18 PROCEDURE — 99442 PR PHYS/QHP TELEPHONE EVALUATION 11-20 MIN: CPT | Performed by: INTERNAL MEDICINE

## 2021-01-18 RX ORDER — AMITRIPTYLINE HYDROCHLORIDE 150 MG/1
TABLET, FILM COATED ORAL
Qty: 90 TABLET | Refills: 0 | Status: SHIPPED
Start: 2021-01-18 | End: 2021-02-11 | Stop reason: SDUPTHER

## 2021-01-18 RX ORDER — SUMATRIPTAN 100 MG/1
TABLET, FILM COATED ORAL
Qty: 20 TABLET | Refills: 0 | Status: SHIPPED
Start: 2021-01-18 | End: 2021-02-11 | Stop reason: SDUPTHER

## 2021-01-18 ASSESSMENT — PATIENT HEALTH QUESTIONNAIRE - PHQ9
SUM OF ALL RESPONSES TO PHQ QUESTIONS 1-9: 0
SUM OF ALL RESPONSES TO PHQ QUESTIONS 1-9: 0

## 2021-01-18 NOTE — PATIENT INSTRUCTIONS
Advice to stop sudafed(no more than 1-2 times a week) with concern for rhinitis medicamentosa, encourage to use cetrizine, intranasal steroid

## 2021-01-18 NOTE — PROGRESS NOTES
[address] Janette Pad[address] [facility]  [facility]    Consent:  The Patient and/or health care decision maker is aware that that he or she may receive a bill for this telephone service, depending on his insurance coverage, and has provided verbal consent to proceed: Yes    Documentation:  I communicated with the patient and/or health care decision maker about     Bacterial sinusitis with hx of chronic allergic rhinitis, improved s/p antibiotics, COVID-19 negative. Chronic migraine headaches stable on prophylaxis with amitriptyline    Breast ca screening: [facility] test BRCA testing negative [facility] records) completed with genetic counselor with + FH breast ca (mother 35s and sister 35s). Follows with [facility] May 2021. Family hx colon cancer: mother in 62s and planned evaluation by GS for screening consideration. .   Details of this discussion including any medical advice provided: see above. I affirm this is a Patient Initiated Episode with a Patient who has not had a related appointment within my department in the past 7 days or scheduled within the next 24 hours. Patient identification was verified at the start of the visit: Yes    Patient's location: home address in [facility]  Physician  location other address in [facility] other people involved in call, Dr. Aashish Cortés. Total Time: minutes: 11-20 minutes    Zina Alvarez D.O.  1/18/2021 2:28 PM    Attending Physician Statement  I have discussed the case, including pertinent history and exam findings with the resident. I also have seen the patient and performed key portions of the examination. I agree with the documented assessment and plan. This visit was performed via Telemedicine during the SYYYO-38 public health crisis.

## 2021-01-18 NOTE — PROGRESS NOTES
Francine Woodall is a 54 y.o. female evaluated via telephone on 1/18/2021. Consent:  She and/or health care decision maker is aware that that she may receive a bill for this telephone service, depending on her insurance coverage, and has provided verbal consent to proceed: Yes      Documentation:  I communicated with the patient and/or health care decision maker about medication refill for migraine headache. Details of this discussion including any medical advice provided:     She has PMH of Migraine on imitrex 100 mg PRN,  Eczema, seasonal alalergy Significant family hx of breast/ovarian/rectal ca.     Chronic Rhinosinusitis   - recent fever, rhinorrhea -> she went to flu clinic diagnosed with bacterial sinusitis -> 10 days of doxy, covid neg  - she use cetrizine and saline irrigation as well but denies using Flonase( reports make her taste bad) not tolerated   - advice to avoid sudafed(1-2 times a week) with concern for rhinitis medicamentosa    Migraine headache   Chronically on amitriptyline 150 mg and imitrex 100, need refil    Significant family hx of breast/ovarian/rectal ca  family hx on mother's side, family hx on father's side, mom with breast CA at age 29's and then again in her 42's (currently age 79). , Sister with breast CA at age 39 (currently 46). nulliparous, reported family hx of ovarian CA, family hx of colon CA (reported in her mother)  genetic test -> [facility] 2/11 negative for 35 genes including APC, BRCA1 and 2  Biopsy on 01/09/2020 PATHOLOGY:  A. Breast, left upper outer, biopsy:  Fibrocystic change and subtle usual ductal hyperplasia (UDH). Negative for malignancy. B. Breast, right upper outer, biopsy:  Benign breast tissue with foci of fibroadenomatoid hyperplasia and focal  pseudoangiomatous stromal hyperplasia ([address] [place] [address] Drive). Negative for malignancy.   -MRI bilateral breast on 12/19/2020:

## 2021-02-11 ENCOUNTER — VIRTUAL VISIT (OUTPATIENT)
Dept: INTERNAL MEDICINE | Age: 46
End: 2021-02-11
Payer: MEDICAID

## 2021-02-11 DIAGNOSIS — G43.109 MIGRAINE WITH AURA AND WITHOUT STATUS MIGRAINOSUS, NOT INTRACTABLE: Chronic | ICD-10-CM

## 2021-02-11 DIAGNOSIS — Z88.9 H/O SEASONAL ALLERGIES: ICD-10-CM

## 2021-02-11 DIAGNOSIS — M47.9 DEGENERATIVE JOINT DISEASE OF LOW BACK: Primary | ICD-10-CM

## 2021-02-11 PROCEDURE — 99441 PR PHYS/QHP TELEPHONE EVALUATION 5-10 MIN: CPT | Performed by: INTERNAL MEDICINE

## 2021-02-11 RX ORDER — SUMATRIPTAN 100 MG/1
TABLET, FILM COATED ORAL
Qty: 20 TABLET | Refills: 0 | Status: SHIPPED
Start: 2021-02-11 | End: 2021-04-14 | Stop reason: SDUPTHER

## 2021-02-11 RX ORDER — CHLORPHENIRAMINE MALEATE 4 MG/1
4 TABLET ORAL EVERY 6 HOURS PRN
Qty: 120 TABLET | Refills: 3 | Status: SHIPPED
Start: 2021-02-11 | End: 2021-02-12

## 2021-02-11 RX ORDER — CETIRIZINE HYDROCHLORIDE 10 MG/1
10 TABLET ORAL DAILY
Qty: 30 TABLET | Refills: 3 | Status: SHIPPED
Start: 2021-02-11 | End: 2021-02-12

## 2021-02-11 RX ORDER — AMITRIPTYLINE HYDROCHLORIDE 150 MG/1
TABLET, FILM COATED ORAL
Qty: 90 TABLET | Refills: 0 | Status: SHIPPED
Start: 2021-02-11 | End: 2021-04-14 | Stop reason: SDUPTHER

## 2021-02-11 NOTE — PATIENT INSTRUCTIONS
We refilled your medications as requested. We also put in for a referral for physiotherapy     -Please take your medications as prescribed. -Please have your Labs done before next follow up appointment.     -If a referral was placed on your behalf during your visit, you should expect to receive information about the date and time of your referral appointment within 7-10 days. If not, please call the nurse line at [phone]. Should you have further questions in regards to this visit, you can review your clinical note and after visit summary document on your [employer] account. Other questions can be directed to our nurse line at [phone]. Patient Education        Migraine Headache: Care Instructions  Your Care Instructions     Migraines are painful, throbbing headaches that often start on one side of the head. They may cause nausea and vomiting and make you sensitive to light, sound, or smell. Without treatment, migraines can last from 4 hours to a few days. Medicines can help prevent migraines or stop them after they have started. Your doctor can help you find which ones work best for you. Follow-up care is a key part of your treatment and safety. Be sure to make and go to all appointments, and call your doctor if you are having problems. It's also a good idea to know your test results and keep a list of the medicines you take. How can you care for yourself at home? · Do not drive if you have taken a prescription pain medicine. · Rest in a quiet, dark room until your headache is gone. Close your eyes, and try to relax or go to sleep. Don't watch TV or read. · Put a cold, moist cloth or cold pack on the painful area for 10 to 20 minutes at a time. Put a thin cloth between the cold pack and your skin. · Use a warm, moist towel or a heating pad set on low to relax tight shoulder and neck muscles. · Have someone gently massage your neck and shoulders. · Take your medicines exactly as prescribed. Call your doctor if you think you are having a problem with your medicine. You will get more details on the specific medicines your doctor prescribes. · Don't take medicine for headache pain too often. Talk to your doctor if you are taking medicine more than 2 days a week to stop a headache. Taking too much pain medicine can lead to more headaches. These are called medicine-overuse headaches. To prevent migraines  · Keep a headache diary so you can figure out what triggers your headaches. Avoiding triggers may help you prevent headaches. Record when each headache began, how long it lasted, and what the pain was like. Write down any other symptoms you had with the headache, such as nausea, flashing lights or dark spots, or sensitivity to bright light or loud noise. Note if the headache occurred near your period. List anything that might have triggered the headache. Triggers may include certain foods (chocolate, cheese, wine) or odors, smoke, bright light, stress, or lack of sleep. · If your doctor has prescribed medicine for your migraines, take it as directed. You may have medicine that you take only when you get a migraine and medicine that you take all the time to help prevent migraines. ? If your doctor has prescribed medicine for when you get a headache, take it at the first sign of a migraine, unless your doctor has given you other instructions. ? If your doctor has prescribed medicine to prevent migraines, take it exactly as prescribed. Call your doctor if you think you are having a problem with your medicine. · Find healthy ways to deal with stress. Migraines are most common during or right after stressful times. Try finding ways to reduce stress like practicing mindfulness or deep breathing exercises. · Get plenty of sleep and exercise. But be careful to not push yourself too hard during exercise. It may trigger a headache. · Eat meals on a regular schedule. Avoid foods and drinks that often trigger migraines. These include chocolate, alcohol (especially red wine and port), aspartame, monosodium glutamate (MSG), and some additives found in foods (such as hot dogs, saenz, cold cuts, aged cheeses, and pickled foods). · Limit caffeine. Don't drink too much coffee, tea, or soda. But don't quit caffeine suddenly. That can also give you migraines. · Do not smoke or allow others to smoke around you. If you need help quitting, talk to your doctor about stop-smoking programs and medicines. These can increase your chances of quitting for good. · If you are taking birth control pills or hormone therapy, talk to your doctor about whether they are triggering your migraines. When should you call for help? Call 911 anytime you think you may need emergency care. For example, call if:    · You have signs of a stroke. These may include:  ? Sudden numbness, paralysis, or weakness in your face, arm, or leg, especially on only one side of your body. ? Sudden vision changes. ? Sudden trouble speaking. ? Sudden confusion or trouble understanding simple statements. ? Sudden problems with walking or balance. ? A sudden, severe headache that is different from past headaches. Call your doctor now or seek immediate medical care if:    · You have new or worse nausea and vomiting.     · You have a new or higher fever.     · Your headache gets much worse. Watch closely for changes in your health, and be sure to contact your doctor if:    · You are not getting better after 2 days (48 hours). Where can you learn more? Go to https://Plang[employer]ranay.[employer]. org and sign in to your [employer] account. Enter N497 in the Grid[employer] box to learn more about \"Migraine Headache: Care Instructions. \"     If you do not have an account, please click on the \"Sign Up Now\" link.   Current as of: November 20, 2019               Content Version: 12.6 © 5723-0740 [employer]. Care instructions adapted under license by [facility] ([facility]). If you have questions about a medical condition or this instruction, always ask your healthcare professional. [address] any warranty or liability for your use of this information.

## 2021-02-11 NOTE — PROGRESS NOTES
[facility] Internal Medicine      SUBJECTIVE:  Ifeoma Smith (:  1975) is a 54 y.o. female here for evaluation of the following chief complaint(s):  Hip Pain (hip/back/neck pain. feels like out of alignment. wants referral to therapy.) and Sinus Problem     Ifeoma Smith is a 54 y.o. female evaluated via telephone on 2021. Consent:  She and/or health care decision maker is aware that that she may receive a bill for this telephone service, depending on her insurance coverage, and has provided verbal consent to proceed: Yes    Documentation:  I communicated with the patient and/or health care decision maker about medical management   Details of this discussion including any medical advice provided:   PMH of migraine, seasonal allergies, degenerative joint disease of back. Pt states she has been having difficulty with movement and 'alignment' of her hip and shoulder. She would like Physical therapy which was the only thing that helped her previously. Denies recent trauma, severe pain, muscle spasms, LOC, falls. 7-8 episodes of Migraine a month- does not take Imitrex on time because of work but complaint with amitripityline     1. Migraine with aura and without status migrainosus, not intractable  - SUMAtriptan (IMITREX) 100 MG tablet; Take one tablet  prn headache. May repeat in 2 hours. No more than 200 mg in 24 hours. Do not treat greater than 4 headaches in 30 days. Dispense: 20 tablet; Refill: 0  - amitriptyline (ELAVIL) 150 MG tablet; TAKE ONE TABLET BY MOUTH NIGHTLY  Dispense: 90 tablet; Refill: 0    2. H/O seasonal allergies  - cetirizine (ZYRTEC) 10 MG tablet; Take 1 tablet by mouth daily  Dispense: 30 tablet; Refill: 3  - chlorpheniramine (EQ CHLORTABS) 4 MG tablet; Take 1 tablet by mouth every 6 hours as needed for Allergies  Dispense: 120 tablet; Refill: 3    3.  Degenerative joint disease of low back
Spoke with pt via phone. Pt states she takes 2 Women Multivitamin. States one of them has Vit C, Vit A, iron, Vit D, Vit B12, Biotin. Reviewed with Dr. Carol Ann Acevedo. All listed vitamins listed under one of her Multivitamin and individual meds removed from [facility] AVS mailed to patient.
Time spent: Greater than 5 - 10 minutes      Suzi Wilkinson DO  Internal Medicine Residency Faculty  2/11/2021      The patient is being evaluated by a Virtual Visit (video visit) encounter to address concerns as mentioned above. A caregiver was present when appropriate. Due to this being a TeleHealth encounter (During [facility] public health emergency), evaluation of the following organ systems was limited: Vitals/Constitutional/EENT/Resp/CV/GI//MS/Neuro/Skin/Heme-Lymph-Imm. Pursuant to the emergency declaration under the [address] [place] [address], [address] [place] [address] authority and the [employer] Act, this Virtual Visit was conducted with patient's (and/or legal guardian's) consent, to reduce the patient's risk of exposure to COVID-19 and provide necessary medical care. The patient (and/or legal guardian) has also been advised to contact this office for worsening conditions or problems, and seek emergency medical treatment and/or call 911 if deemed necessary. Services were provided through a video synchronous discussion virtually to substitute for in-person clinic visit. Patient and provider were located at their individual homes.

## 2021-02-12 RX ORDER — CETIRIZINE HYDROCHLORIDE 10 MG/1
10 TABLET ORAL DAILY
Qty: 30 TABLET | Refills: 3 | Status: SHIPPED
Start: 2021-02-12 | End: 2021-04-14 | Stop reason: SDUPTHER

## 2021-02-12 RX ORDER — CHLORPHENIRAMINE MALEATE 4 MG/1
4 TABLET ORAL EVERY 6 HOURS PRN
Qty: 120 TABLET | Refills: 3 | Status: SHIPPED
Start: 2021-02-12 | End: 2021-10-05

## 2021-02-22 ENCOUNTER — TELEPHONE (OUTPATIENT)
Dept: SURGERY | Age: 46
End: 2021-02-22

## 2021-02-22 NOTE — TELEPHONE ENCOUNTER
MA received a perfectserve from patient in regards to needing to reschedule her appointment with  due to work. MA contacted patient to reschedule and she wants to be seen in March and needs another day other then Monday. MA advised can get her scheduled on a day that works best with her but Hanna Carver only sees patients on Mondays. Patient verbalized understanding and asked if she could call back to reschedule appt. MA verbalized understanding.        Electronically signed by Omari Matias MA on 2/22/21 at 8:00 AM EST

## 2021-02-22 NOTE — TELEPHONE ENCOUNTER
MA received a return call from pt stating that she is able to come Monday march 8 as she really would like Dr. Bekah Rowell, MA scheduled pt for   3/8/21 @ 8:30 am, pt accepted date and time and verbalized understanding.     Electronically signed by Fermin Lopez on 2/22/21 at 8:22 AM EST

## 2021-03-02 ENCOUNTER — HOSPITAL ENCOUNTER (OUTPATIENT)
Dept: PHYSICAL THERAPY | Age: 46
Setting detail: THERAPIES SERIES
Discharge: HOME | End: 2021-03-02
Payer: MEDICAID

## 2021-03-02 NOTE — PROGRESS NOTES
Physical Therapy                    [facility]                Phone: [phone]  Fax: [phone]    Physical Therapy  Cancellation/No-show Note  Patient Name:  Himanshu Neal  :  1975   Date:  3/2/2021    For today's appointment patient:  []  Cancelled  []  Rescheduled appointment  [x]  No-show     Reason given by patient:  []  Patient ill  []  Conflicting appointment  []  No transportation    []  Conflict with work  [x]  No reason given  []  Other:     Comments:  Pt did not show for initial evaluation today. No future appointments scheduled at this time.    Electronically signed by:  Sg Zamora , PT, DPT  Michell Mckenzie, SPT

## 2021-03-12 ENCOUNTER — TELEPHONE (OUTPATIENT)
Dept: ALLERGY | Age: 46
End: 2021-03-12

## 2021-03-12 NOTE — TELEPHONE ENCOUNTER
Message  Left for patient regarding scheduling appointment in allergy clinic for evaluation with Taylor Bowers.

## 2021-03-18 ENCOUNTER — TELEPHONE (OUTPATIENT)
Dept: ALLERGY | Age: 46
End: 2021-03-18

## 2021-03-22 ENCOUNTER — VIRTUAL VISIT (OUTPATIENT)
Dept: SURGERY | Age: 46
End: 2021-03-22
Payer: MEDICAID

## 2021-03-22 DIAGNOSIS — Z80.0 FAMILY HISTORY OF COLON CANCER: Primary | ICD-10-CM

## 2021-03-22 PROCEDURE — 99442 PR PHYS/QHP TELEPHONE EVALUATION 11-20 MIN: CPT | Performed by: SURGERY

## 2021-03-22 RX ORDER — POLYETHYLENE GLYCOL 3350 17 G/17G
17 POWDER, FOR SOLUTION ORAL DAILY
Qty: 510 G | Refills: 0 | Status: SHIPPED | OUTPATIENT
Start: 2021-03-22 | End: 2021-04-21

## 2021-03-22 RX ORDER — BISACODYL 5 MG
10 TABLET, DELAYED RELEASE (ENTERIC COATED) ORAL DAILY
Qty: 2 TABLET | Refills: 0 | Status: SHIPPED
Start: 2021-03-22 | End: 2021-10-05

## 2021-03-22 NOTE — PATIENT INSTRUCTIONS
[facility]    Instructions for Clear liquid diet  Definition  A clear liquid diet consists of clear liquids, such as water, broth and plain gelatin, that are easily digested and leave no undigested residue in your intestinal tract. Your doctor may prescribe a clear liquid diet before certain medical procedures or if you have certain digestive problems. Because a clear liquid diet can't provide you with adequate calories and nutrients, it shouldn't be continued for more than a few days. Purpose  A clear liquid diet is often used before tests, procedures or surgeries that require no food in your stomach or intestines, such as before colonoscopy. It may also be recommended as a short-term diet if you have certain digestive problems, such as nausea, vomiting or diarrhea, or after certain types of surgery. Diet details  A clear liquid diet helps maintain adequate hydration, provides some important electrolytes, such as sodium and potassium, and gives some energy at a time when a full diet isn't possible or recommended. The following foods are allowed in a clear liquid diet:    Plain water    Fruit juices without pulp, such as apple juice, white grape juice.  Strained lemonade    Clear, fat-free broth (bouillon or consomme)    Clear sodas     Plain gelatin (Not RED or PURPLE)   Honey    Ice pops without bits of fruit or fruit pulp    Tea or coffee without milk or cream   ** NOTHING RED OR PURPLE   **Do not eat corn, tomatoes, peas or watermelon 3 to 5 days before procedure. Any foods not on the above list should be avoided. Also, for certain tests, such as colon exams, your doctor may ask you to avoid liquids or gelatin with red coloring.      A typical menu on the clear liquid diet may look like this:   Breakfast:  1 glass fruit juice  1 cup coffee or tea (without dairy products)  1 cup broth  1 bowl gelatin     Snack:  1 glass fruit juice  1 bowl gelatin     Lunch:  1 glass fruit juice  1 glass water  1 cup broth  1 bowl gelatin     Snack:  1 ice pop (without fruit pulp)  1 cup coffee or tea (without dairy products) or a soft drink     Dinner:  1 cup juice or water  1 cup broth  1 bowl gelatin  1 cup coffee or tea     Purchase this over the counter:  1. GATORADE/Clear liquid (64 ounces)   Pick these up at the pharmacy:  2. DULCOLAX 5 mg tablets (four tablets)  3. MIRALAX BOTTLE 500 grams     The DAY BEFORE your colonoscopy:   Drink only clear liquids. (Absolutely no solid food)    COLONOSCOPY PREP:  12 PM: Take 2 DULCOLAX tablets and mix the entire bottle/bottles of MIRALAX into the 64 ounces of GATORADE. (Put half the bottle in each 32 ounce bottle if have one large bottle). Shake the solution until fully dissolved. Drink an 8 ounce glass every 30 minutes until the solution is gone. 4 PM: Take the last 2 DULCOLAX tablets. **DO NOT EAT OR DRINK ANYTHING AFTER MIDNIGHT**          The DAY OF your colonoscopy: You may take any necessary medications with a sip of water. Bring along someone to take you home. REMEMBER: The preparation is very important. An adequate clean out allows for the best evaluation of your entire colon. During the prep, using baby wipes may ease some of your discomfort. You should NOT plan on working or driving the rest of the day due to sedation given at the procedure. Patient Information and Instructions for Colonoscopy         Definition of Colonoscopy   A colonoscopy is the visual exam of the rectum and colon (large intestine). The exam is done with a tool called a colonoscope. The colonoscope is a flexible tube with a tiny camera on the end. This instrument allows the doctor to view the inside of your rectum and colon. Sigmoidoscopy is a shorter scope that views only the last one third of the colon. Reasons for Colonoscopy   It is used to examine, diagnose, and treat problems in your large intestine.  The procedure is most often done for the following reasons: To determine the cause of abdominal pain, rectal bleeding, or a change in bowel habits   To detect and treat colon cancer or colon polyps   To obtain tissue samples for testing   To stop intestinal bleeding   Monitor response to treatment if you have inflammatory bowel disease     Possible Complications   Complications are rare, but no procedure is completely free of risk. If you are planning to have a colonoscopy, your doctor will review a list of possible complications, which may include:   Bleeding   Reaction to the sedation causing drop in your blood pressure or problems breathing  Perforation or puncture of the bowel     Factors that may increase the risk of complications include:   Pre-existing heart or kidney condition   Treatment with certain medicines, including aspirin and other drugs with anticoagulant or blood-thinning properties   Prior abdominal surgery or radiation treatments   Active colitis , diverticulitis , or other acute bowel disease   Previous treatment with radiation therapy     Be sure to discuss these risks with your doctor before the procedure. What to Expect   Prior to Procedure   Your doctor will likely do the following:   Physical exam   Health history   Review of medicines   Test your stool for hidden blood (called \"occult blood\")     Your colon must be completely clean before the procedure. Any stool left in the intestine will block the view. This preparation may start several days before the procedure. Follow your doctor's instructions. Leading up to your procedure:   Talk to your doctor about your medicines.  You may be asked to stop taking some medicines up to one week before the procedure, like:   Anti-inflammatory drugs (e.g., aspirin )   Blood thinners like clopidogrel (Plavix) or warfarin (Coumadin)   Iron supplements or vitamins containing iron   The day or days before your procedure, go on a clear liquid diet (clear broth, clear juice, clear jello) with no red coloring  Do not eat or drink anything after midnight. Wear comfortable clothing. If you have diabetes, ask your doctor if you need to adjust your diabetes medicine on the day prior to your procedure and the day of your procedure. Arrange for a ride home after the procedure. Anesthesia   You will receive intravenous sedation medicine for the procedure so you will not feel anything during the procedure. Description of the Procedure   You will lie on your left side with knees bent and drawn up toward your chest. The colonoscope will be slowly inserted through the rectum and into the bowel. The colonoscope will inject air into the colon. A small attached video camera will allow the doctor to view the colon's lining on a screen. The doctor will continue guiding the tool through the bowel and assess the lining. A tissue sample or polyps may be removed during the procedure. How Long Will It Take? Usually it takes about 30 to 45 minutes     Will It Hurt? Most people do not feel anything during the procedure and will not remember the procedure. After the procedure, gas pains and cramping are common. These pains should go away with the passing of gas. Post-procedure Care   If any tissue was removed: It will be sent to a lab to be examined. It may take 1-2 weeks for results. The doctor will usually give an initial report after the scope is removed. Other tests may be recommended. A small amount of bleeding may occur during the first few days after the procedure. When you return home after the procedure, be sure to follow your doctor's instructions, which may include:   Resume medicines as instructed by your doctor. Resume normal diet, unless directed otherwise by your doctor. The sedative will make you drowsy. Avoid driving, operating machinery, or making important decisions for the rest of the day. Rest for the remainder of the day.      After arriving home, contact your doctor if any of the following occurs:   Bleeding from your rectum, notify your doctor if you pass a teaspoonful of blood or more. Black, tarry stools   Severe abdominal pain   Hard, swollen abdomen   Signs of infection, including fever or chills   Inability to pass gas or stool   Coughing, shortness of breath, chest pain, severe nausea or vomiting     In case of an emergency, CALL 911 . Any questions or concerns, please contact my medical assistant Mikaela at [phone].

## 2021-03-22 NOTE — PROGRESS NOTES
CANDICE Larson called and spoke to Sergio granados and scheduled PT for colonoscopy on 3/31/2021 @ 1:15pm with Dr. Francoise Jaffe. PT denied taking any ASA/blood thinner products. PT verbalized she understood prep instructions, and NPO after midnight. PT verbalized that she understood appointment date/time, as well as to arrive 1 hours prior to procedure. PT advised PAT will call to go over all medication and advise on where to park and enter the hospital at for procedure. PT has been told to make sure they have a ride to and from procedure as they are not allowed to drive after procedure. PT procedure letter was emailed to them with all instructions also on it. MA instructed PT to call the office at [phone] with any questions, comments, or concerns about the procedure.        Electronically signed by Juju Clayton MA on 3/22/21 at 12:43 PM EDT

## 2021-03-23 ASSESSMENT — ENCOUNTER SYMPTOMS
NAUSEA: 0
CONSTIPATION: 1
ANAL BLEEDING: 0
RESPIRATORY NEGATIVE: 1
ABDOMINAL PAIN: 0
ABDOMINAL DISTENTION: 0
COUGH: 0
EYES NEGATIVE: 1
VOMITING: 0
ALLERGIC/IMMUNOLOGIC NEGATIVE: 1
DIARRHEA: 1
BLOOD IN STOOL: 0
SHORTNESS OF BREATH: 0
BACK PAIN: 0

## 2021-03-23 NOTE — PROGRESS NOTES
Lola Sanchez is a 54 y.o. female evaluated via telephone on 3/22/2021. Consent:  She and/or health care decision maker is aware that that she may receive a bill for this telephone service, depending on her insurance coverage, and has provided verbal consent to proceed: Yes      Documentation:  I communicated with the patient and/or health care decision maker about colonoscopy. Details of this discussion including any medical advice provided: regarding colonoscopy and prep      I affirm this is a Patient Initiated Episode with a Patient who has not had a related appointment within my department in the past 7 days or scheduled within the next 24 hours. Patient identification was verified at the start of the visit: Yes    Total Time: minutes: 11-20 minutes    The visit was conducted pursuant to the emergency declaration under the [address] [place] [address], [address] [place] [address] authority and the [employer] Act. Patient identification was verified, and a caregiver was present when appropriate. The patient was located in a state where the provider was credentialed to provide care. Note: not billable if this call serves to triage the patient into an appointment for the relevant concern      Sam Emmanuel 4575 NOTE    Chief Complaint   Patient presents with    Consultation     Colonoscopy consult(ref by Elizabeth Britton NP).  Pt states mother has caolon cancer    Colonoscopy     Pt states has never had one    Constipation     Pt states if doesnt drink enough water does get constipated    Diarrhea     Pt states depends on what eats can cause episodes of diarrhea    Rectal Bleeding     pt denies    Weight Loss     pt denies    Abdominal Pain     only when she is constipated    Nausea & Vomiting     pt denies         HPI:  54 y.o. female denies weight loss, melena, hematochezia, N/V, abdominal pain. she denies family history of Crohn's disease or colitis. She has never had a colonoscopy before. She follows with Milana in the high risk breast clinic. Her mother had rectal cancer at age 27. She was seen at [facility] genetic and had a VUS on MLH1 but no true abnormalities. She has intermittent diarrhea and constipation.       Past Medical History:   Diagnosis Date    Degenerative joint disease of low back 2/11/2021    Migraine 20 years    Sinusitis (chronic)        Past Surgical History:   Procedure Laterality Date    ADENOIDECTOMY      CHOLECYSTECTOMY  age 24    TONSILLECTOMY  age 1    WISDOM TOOTH EXTRACTION         Family History   Problem Relation Age of Onset    Cancer Mother 29        breast, bilateral, more than once; gastric cancer    Heart Disease Mother     Asthma Mother     Stroke Mother     Migraines Mother     Arthritis Mother     Colon Cancer Mother 27        rectal cancer    High Blood Pressure Father     Cancer Father         prostate    Migraines Father     Cancer Sister 29        breast and ovarian    Stroke Sister     Breast Cancer Maternal Grandmother     Cancer Paternal Grandmother         breast    Cancer Paternal Grandfather         prostate   Prakash Migraines Sister     Breast Cancer Maternal Aunt     Cancer Maternal Aunt         ovarian cancer    Colon Cancer Maternal Aunt     Breast Cancer Paternal Aunt     Prostate Cancer Paternal Uncle     Breast Cancer Maternal Cousin         under age of 48    Cancer Maternal Cousin         ovarian    Breast Cancer Maternal Aunt     Cancer Maternal Aunt         ovarian    Colon Cancer Maternal Aunt     Breast Cancer Maternal Aunt     Breast Cancer Paternal Great Grandmother     Breast Cancer Maternal Great Grandmother     Breast Cancer Paternal Aunt     Breast Cancer Maternal Cousin         under age of 48    Breast Cancer Maternal Cousin         under age of 48   Prakash Breast Cancer Maternal Cousin         under age of 48    Breast Cancer Maternal Cousin         under age of 48       Allergies   Allergen Reactions    Latex Anaphylaxis, Hives and Rash    Iodine Hives    Trazodone And Nefazodone Nausea And Vomiting    Bactrim Hives    Iodides Hives    Aspirin Hives and Nausea And Vomiting    Tylenol With Codeine [Acetaminophen-Codeine] Nausea And Vomiting    Ultram [Tramadol Hcl] Nausea And Vomiting    Vicodin Tuss [Hydrocodone-Guaifenesin] Nausea And Vomiting       Social History     Tobacco Use    Smoking status: Never Smoker    Smokeless tobacco: Never Used   Substance Use Topics    Alcohol use: Yes     Alcohol/week: 0.0 standard drinks     Comment: occ    Drug use: No       Current Outpatient Medications   Medication Sig Dispense Refill    polyethylene glycol (GLYCOLAX) 17 GM/SCOOP powder Take 17 g by mouth daily 510 g 0    bisacodyl (DULCOLAX) 5 MG EC tablet Take 2 tablets by mouth daily 2 tablet 0    chlorpheniramine (EQ CHLORTABS) 4 MG tablet Take 1 tablet by mouth every 6 hours as needed for Allergies 120 tablet 3    cetirizine (ZYRTEC) 10 MG tablet Take 1 tablet by mouth daily 30 tablet 3    SUMAtriptan (IMITREX) 100 MG tablet Take one tablet  prn headache. May repeat in 2 hours. No more than 200 mg in 24 hours. Do not treat greater than 4 headaches in 30 days. 20 tablet 0    amitriptyline (ELAVIL) 150 MG tablet TAKE ONE TABLET BY MOUTH NIGHTLY 90 tablet 0    MULTIPLE VITAMINS PO Take 1 tablet by mouth daily      diphenhydrAMINE (SOMINEX) 25 MG tablet Take 1 tablet by mouth daily as needed      mineral oil-hydrophilic petrolatum (AQUAPHOR) ointment APPLY TOPICALLY AS NEEDED 52.5 g 3    diclofenac sodium 1 % GEL Apply 2 g topically 4 times daily 2 Tube 1    Multiple Vitamins-Minerals (MULTIVITAMIN GUMMIES ADULT PO) Take 1 tablet by mouth daily Per pt. Vitamin has Vit C, Vit A, Vit B12, Biotin, Vit D, Iron.  Misc.  Devices MISC Please provide braces for carpal tunnel syndrome, bilateral. 2 Device 0    melatonin 3 MG TABS tablet Take 3 mg by mouth daily       No current facility-administered medications for this visit. Review of Systems   Constitutional: Negative. Negative for activity change, appetite change and unexpected weight change. HENT: Negative. Eyes: Negative. Respiratory: Negative. Negative for cough and shortness of breath. Cardiovascular: Negative. Negative for chest pain and leg swelling. Gastrointestinal: Positive for constipation and diarrhea. Negative for abdominal distention, abdominal pain, anal bleeding, blood in stool, nausea and vomiting. Endocrine: Negative. Genitourinary: Negative. Musculoskeletal: Negative. Negative for arthralgias, back pain, gait problem, joint swelling and myalgias. Skin: Negative. Allergic/Immunologic: Negative. Neurological: Negative. Negative for dizziness, weakness and headaches. Hematological: Negative. Psychiatric/Behavioral: Negative. Negative for confusion, decreased concentration and sleep disturbance. Physical examination not performed due to virtual visit. ASSESSMENT/PLAN:  1.  family history of colon cancer in a 1st degree relative <60y  -- plan for colonoscopy    Prep:  miralax prep    Colonoscopy. The patient was explained the risks/benefits/alternatives/expected outcomes of the procedure. The patient was explained the risks of the procedure, including, but not limited to, the risk of reaction to the anesthesia medicine and the risk of perforation requiring further surgery. The patient was informed that they may require biopsy or polypectomy. These procedures may increase the risk of complication. All questions were answered. The patient verbalized understanding and agreed to proceed.     Kacey Mojica MD, MSc, FACS  3/23/2021  11:50 AM

## 2021-03-26 ENCOUNTER — HOSPITAL ENCOUNTER (OUTPATIENT)
Age: 46
Discharge: HOME | End: 2021-03-28
Payer: MEDICAID

## 2021-03-26 ENCOUNTER — IMMUNIZATION (OUTPATIENT)
Dept: PRIMARY CARE CLINIC | Age: 46
End: 2021-03-26
Payer: MEDICAID

## 2021-03-26 DIAGNOSIS — U07.1 COVID-19: ICD-10-CM

## 2021-03-26 PROCEDURE — 0001A COVID-19, PFIZER VACCINE 30MCG/0.3ML DOSE: CPT | Performed by: PHYSICIAN ASSISTANT

## 2021-03-26 PROCEDURE — U0003 INFECTIOUS AGENT DETECTION BY NUCLEIC ACID (DNA OR RNA); SEVERE ACUTE RESPIRATORY SYNDROME CORONAVIRUS 2 (SARS-COV-2) (CORONAVIRUS DISEASE [COVID-19]), AMPLIFIED PROBE TECHNIQUE, MAKING USE OF HIGH THROUGHPUT TECHNOLOGIES AS DESCRIBED BY CMS-2020-01-R: HCPCS

## 2021-03-26 PROCEDURE — 91300 COVID-19, PFIZER VACCINE 30MCG/0.3ML DOSE: CPT | Performed by: PHYSICIAN ASSISTANT

## 2021-03-29 LAB
SARS-COV-2: NOT DETECTED
SOURCE: 1

## 2021-03-30 ENCOUNTER — TELEPHONE (OUTPATIENT)
Dept: SURGERY | Age: 46
End: 2021-03-30

## 2021-03-31 RX ORDER — BISACODYL 5 MG
10 TABLET, DELAYED RELEASE (ENTERIC COATED) ORAL 2 TIMES DAILY
Qty: 2 TABLET | Refills: 0 | Status: SHIPPED | OUTPATIENT
Start: 2021-03-31 | End: 2021-10-05

## 2021-03-31 RX ORDER — POLYETHYLENE GLYCOL 3350 17 G/17G
500 POWDER, FOR SOLUTION ORAL ONCE
Qty: 500 G | Refills: 0 | Status: SHIPPED | OUTPATIENT
Start: 2021-03-31 | End: 2021-03-31

## 2021-03-31 NOTE — TELEPHONE ENCOUNTER
MA attempted to contact both numbers in patients chart and they both rang and neither have VMs. MA rescheduled patient colonoscopy to 06/02/2021 @ 9am with 8am arrival. MA mailed patient surgery letter to address on file with new date and time. Patient needs new prep called into pharmacy, MA will route message to  for further assistance with refill.          Electronically signed by Khanh Contreras MA on 3/31/21 at 11:06 AM EDT

## 2021-04-01 ENCOUNTER — TELEPHONE (OUTPATIENT)
Dept: SURGERY | Age: 46
End: 2021-04-01

## 2021-04-06 ENCOUNTER — TELEPHONE (OUTPATIENT)
Dept: BREAST CENTER | Age: 46
End: 2021-04-06

## 2021-04-06 NOTE — TELEPHONE ENCOUNTER
Left message with family member in reference to patient being due for her breast MRI. Left call back number to discuss details. Will need to determine menstrual cycle status and likely needs lab work prior.

## 2021-04-13 ENCOUNTER — TELEPHONE (OUTPATIENT)
Dept: BREAST CENTER | Age: 46
End: 2021-04-13

## 2021-04-14 ENCOUNTER — VIRTUAL VISIT (OUTPATIENT)
Dept: INTERNAL MEDICINE | Age: 46
End: 2021-04-14
Payer: MEDICAID

## 2021-04-14 DIAGNOSIS — Z88.9 H/O SEASONAL ALLERGIES: ICD-10-CM

## 2021-04-14 DIAGNOSIS — G43.109 MIGRAINE WITH AURA AND WITHOUT STATUS MIGRAINOSUS, NOT INTRACTABLE: Chronic | ICD-10-CM

## 2021-04-14 DIAGNOSIS — L20.82 FLEXURAL ECZEMA: ICD-10-CM

## 2021-04-14 PROCEDURE — 99442 PR PHYS/QHP TELEPHONE EVALUATION 11-20 MIN: CPT | Performed by: INTERNAL MEDICINE

## 2021-04-14 RX ORDER — ACETAMINOPHEN 160 MG
1 TABLET,DISINTEGRATING ORAL DAILY
COMMUNITY

## 2021-04-14 RX ORDER — SUMATRIPTAN 100 MG/1
TABLET, FILM COATED ORAL
Qty: 20 TABLET | Refills: 0 | Status: SHIPPED | OUTPATIENT
Start: 2021-04-14 | End: 2021-10-05 | Stop reason: SDUPTHER

## 2021-04-14 RX ORDER — CETIRIZINE HYDROCHLORIDE 10 MG/1
10 TABLET ORAL DAILY
Qty: 30 TABLET | Refills: 3 | Status: SHIPPED | OUTPATIENT
Start: 2021-04-14 | End: 2021-09-23

## 2021-04-14 RX ORDER — AMITRIPTYLINE HYDROCHLORIDE 150 MG/1
TABLET, FILM COATED ORAL
Qty: 90 TABLET | Refills: 0 | Status: SHIPPED | OUTPATIENT
Start: 2021-04-14 | End: 2021-08-12 | Stop reason: SDUPTHER

## 2021-04-14 RX ORDER — MULTIVIT WITH MINERALS/LUTEIN
500 TABLET ORAL DAILY
COMMUNITY

## 2021-04-14 NOTE — PROGRESS NOTES
Vipin Hernández is a 54 y.o. female evaluated via telephone on 4/14/2021. Consent:  She and/or health care decision maker is aware that that she may receive a bill for this telephone service, depending on her insurance coverage, and has provided verbal consent to proceed: Yes    She has PMH of Migraine , Eczema, seasonal alalergy Significant family hx of breast/ovarian/rectal ca.     Migraine headache  -on amitriptyline 150 mg daily and imitrex 100 mg  prn    Significant family hx of breast/ovarian/rectal ca  -family hx on mother's side, family hx on father's side, mom with breast CA at age 32's and then again in her 40's (currently age 79). , Sister with breast CA at age 39 (currently 46). nulliparous, reported family hx of ovarian CA, family hx of colon CA (reported in her mother)  -genetic testing/biopsy :negative, pending follow up MRI breast.   -follow at [facility],last visit on 11/2020  -follow up colonscopy schedule on 6/2/2021    Eczema  -reports using cetrizine and ?hydrocotisone ointment OTC  - requesting prescription for hydrocortisone ointment , discussed to re access in next visit  - continue Aquaphor      Health Maintenance   -pap smear/HPV: normal(9/2019)>>next 1 month  -mammogram:left focal asymmetry>>US>MRI with tissue biopsy showed fibrocystic changes and ductal hyperplasia with negative for malignancy  -plan  Mammogram(11/2021) with MRI breast on may per [facility]  -colonoscopy: hx of mother CRC at age <60 years ,strongly recommend colonoscopy q 5 years>> >>follow up colonscopy schedule on 6/2/2021  -Tdap/Peumoovac:done  -Hba1c:5.7    RTC in 3 month clinic visit    Documentation:  I communicated with the patient and/or health care decision maker about yes.    Details of this discussion including any medical advice provided: yes      I affirm this is a Patient Initiated Episode with a Patient who has not had a related appointment within my department in the past 7 days or

## 2021-04-14 NOTE — PROGRESS NOTES
Special Virtual Visit done per   Patients questions were addressed and answered Printed AVS  was mailed to pt

## 2021-04-14 NOTE — PROGRESS NOTES
[facility]    Attending Physician Statement  I have discussed the case, including pertinent history and exam findings with the resident physician. Patient evaluated by telephone during [place] [address] public health emergency. I agree with the assessment, plan and orders as documented by the resident. I have reviewed all pertinent PMHx, PSHx, FamHx, SocialHx, medications, and allergies and updated history as appropriate. Patient presents for routine follow up of medical problems. No new issues today. Migraine - uses daily amitriptyline. Imitrex PRN. Had to take Imitrex 3-4 times last month. Eczema - was using OTC steroid. Agree with need to have patient come for visit to assess this issue   Use of OTC moisturizer    History of familial cancer - follows with mammo and MRI of breast at [facility]. Continue to follow - MRI to be scheduled. Scheduled for colonoscopy in June, 2021   Due to see women's health clinic in May. Remainder of medical problems as per resident note.     Ana Esteves MD  4/14/2021 1:46 PM

## 2021-04-15 ENCOUNTER — TELEPHONE (OUTPATIENT)
Dept: INTERNAL MEDICINE | Age: 46
End: 2021-04-15

## 2021-04-15 ENCOUNTER — TELEPHONE (OUTPATIENT)
Dept: BREAST CENTER | Age: 46
End: 2021-04-15

## 2021-04-15 NOTE — TELEPHONE ENCOUNTER
Patient returned our call in reference to her breast MRI. She is aware to call us on Day 1 of her next cycle which should be end of April or beginning of May. She is aware lab work will be needed.

## 2021-04-15 NOTE — TELEPHONE ENCOUNTER
Pt called in states she would like her medications to be sent to [facility] upon doing VV visit yesterday she requested they sent to her pharmacy mail ordered informed she needs to have [facility] call her mail order pharmacy to have medications transfered

## 2021-04-22 ENCOUNTER — IMMUNIZATION (OUTPATIENT)
Dept: PRIMARY CARE CLINIC | Age: 46
End: 2021-04-22
Payer: MEDICAID

## 2021-04-22 PROCEDURE — 91300 COVID-19, PFIZER VACCINE 30MCG/0.3ML DOSE: CPT

## 2021-04-22 PROCEDURE — 0002A COVID-19, PFIZER VACCINE 30MCG/0.3ML DOSE: CPT

## 2021-04-29 ENCOUNTER — TELEPHONE (OUTPATIENT)
Dept: INTERNAL MEDICINE | Age: 46
End: 2021-04-29

## 2021-05-02 ENCOUNTER — APPOINTMENT (OUTPATIENT)
Dept: GENERAL RADIOLOGY | Age: 46
End: 2021-05-02
Payer: MEDICAID

## 2021-05-02 ENCOUNTER — HOSPITAL ENCOUNTER (EMERGENCY)
Age: 46
Discharge: HOME | End: 2021-05-02
Attending: FAMILY MEDICINE
Payer: MEDICAID

## 2021-05-02 VITALS
OXYGEN SATURATION: 95 % | TEMPERATURE: 97.2 F | HEIGHT: 60 IN | RESPIRATION RATE: 16 BRPM | DIASTOLIC BLOOD PRESSURE: 88 MMHG | HEART RATE: 88 BPM | WEIGHT: 200 LBS | SYSTOLIC BLOOD PRESSURE: 132 MMHG | BODY MASS INDEX: 39.27 KG/M2

## 2021-05-02 DIAGNOSIS — S93.402A MODERATE LEFT ANKLE SPRAIN, INITIAL ENCOUNTER: Primary | ICD-10-CM

## 2021-05-02 PROCEDURE — 73610 X-RAY EXAM OF ANKLE: CPT

## 2021-05-02 PROCEDURE — 99283 EMERGENCY DEPT VISIT LOW MDM: CPT

## 2021-05-02 RX ORDER — ACETAMINOPHEN 500 MG
1000 TABLET ORAL EVERY 8 HOURS PRN
Qty: 50 TABLET | Refills: 0 | Status: SHIPPED | OUTPATIENT
Start: 2021-05-02 | End: 2022-04-12

## 2021-05-02 RX ORDER — IBUPROFEN 400 MG/1
400 TABLET ORAL EVERY 8 HOURS PRN
Qty: 12 TABLET | Refills: 0 | Status: SHIPPED | OUTPATIENT
Start: 2021-05-02

## 2021-05-02 ASSESSMENT — PAIN DESCRIPTION - DESCRIPTORS: DESCRIPTORS: THROBBING

## 2021-05-02 ASSESSMENT — PAIN DESCRIPTION - ORIENTATION: ORIENTATION: LEFT

## 2021-05-02 ASSESSMENT — PAIN DESCRIPTION - LOCATION: LOCATION: ANKLE

## 2021-05-02 ASSESSMENT — PAIN DESCRIPTION - PROGRESSION: CLINICAL_PROGRESSION: GRADUALLY WORSENING

## 2021-05-02 ASSESSMENT — PAIN DESCRIPTION - ONSET: ONSET: SUDDEN

## 2021-05-02 NOTE — LETTER
[address] [facility] Emergency Department  [address] [facility] [address]  Phone: [phone]               May 2, 2021    Patient: Gerhardt Dama   YOB: 1975   Date of Visit: 5/2/2021       To Whom It May Concern:    Austyn Hoang was seen and treated in our emergency department on 5/2/2021. She may return to work 5-3-2021.       Sincerely,       Yonathan Jones RN         Signature:__________________________________

## 2021-05-02 NOTE — ED PROVIDER NOTES
[address] [facility]  Department of Emergency Medicine   ED  Encounter Note  Admit Date/RoomTime: 2021 11:30 AM  ED Room:     NAME: Magdalena Cage  : 1975  MRN: [id number]     Chief Complaint:  Ankle Pain (left ankle twisted yesterday. +pain and swelling. )    History of Present Illness         Magdalena Cage is a 51 y.o. old female presenting to the emergency department by private vehicle, for traumatic Left ankle pain which occured 1 day(s) prior to arrival.  The complaint is due to inversion injury while walking. Since onset the symptoms have been persistent with ability to bear weight, but with some pain. Patient has no prior history of pain/injury with regards to today's visit. Her pain is aggraveated by any movement, any use of or pressure on or palpation of and relieved by ice and rest of injured area. She denies any head injury, chest pain, abdominal pain, back pain, numbness or weakness. Tetanus Status: up to date. ROS   Pertinent positives and negatives are stated within HPI, all other systems reviewed and are negative. Past Medical History:  has a past medical history of Degenerative joint disease of low back, Migraine, and Sinusitis (chronic). Surgical History:  has a past surgical history that includes Cholecystectomy (age 24); Tonsillectomy (age 1); [place] tooth extraction; and Adenoidectomy. Social History:  reports that she has never smoked. She has never used smokeless tobacco. She reports current alcohol use. She reports that she does not use drugs.     Family History: family history includes Arthritis in her mother; Asthma in her mother; Breast Cancer in her maternal aunt, maternal aunt, maternal aunt, maternal cousin, maternal cousin, maternal cousin, maternal cousin, maternal cousin, maternal grandmother, maternal great grandmother, paternal aunt, paternal aunt, and paternal great grandmother; Cancer in her father, maternal aunt, maternal aunt, maternal cousin, paternal grandfather, and paternal grandmother; Cancer (age of onset: 29) in her mother and sister; Colon Cancer in her maternal aunt and maternal aunt; Colon Cancer (age of onset: 27) in her mother; Heart Disease in her mother; High Blood Pressure in her father; Migraines in her father, mother, and sister; Prostate Cancer in her paternal uncle; Stroke in her mother and sister. Allergies: Latex, Iodine, Trazodone and nefazodone, Bactrim, Iodides, Aspirin, Tylenol with codeine [acetaminophen-codeine], Ultram [tramadol hcl], and Vicodin tuss [hydrocodone-guaifenesin]    Physical Exam   Oxygen Saturation Interpretation: Normal.        ED Triage Vitals   BP Temp Temp Source Pulse Resp SpO2 Height Weight   05/02/21 1130 05/02/21 1130 05/02/21 1130 05/02/21 1130 05/02/21 1130 05/02/21 1130 05/02/21 1145 05/02/21 1145   132/88 97.2 °F (36.2 °C) Infrared 101 18 95 % 5' (1.524 m) 200 lb (90.7 kg)         Constitutional:  Alert, development consistent with age. Neck:  Normal ROM. Supple. Ankle:  Left Lateral:              Tenderness:  moderate. Swelling: Moderate. Deformity: no.             ROM: diminished range with pain. Skin:  normal exam; no wounds, erythema, or swelling. Neurovascular: Motor deficit: none. Sensory deficit:   none. Pulse deficit: none. Capillary refill: normal.  Knee:              Tenderness:  none. Swelling: None. Deformity: no.             ROM: full range of motion. Skin:  normal exam; no wounds, erythema, or swelling. Foot:              Tenderness:  none. Swelling: None. Deformity: no.             ROM: full range of motion. Skin:  normal exam; no wounds, erythema, or swelling.}. Gait:  limp due to affected limb. Lymphatics: No lymphangitis or adenopathy noted. Neurological:  Oriented.   Motor functions intact. Lab / Imaging Results   (All laboratory and radiology results have been personally reviewed by myself)  Labs:  No results found for this visit on 05/02/21. Imaging: All Radiology results interpreted by Radiologist unless otherwise noted. XR ANKLE LEFT (MIN 3 VIEWS)   Final Result   Lateral soft tissue swelling      No acute fracture identified           ED Course / Medical Decision Making   Medications - No data to display     Consults:   None    Procedure(s):   none    MDM:      Imaging was obtained based on moderate suspicion for fracture / bony abnormality as per history/physical findings. Plan is subsequently for symptom control, limited use and  immobilization with appropriate outpatient follow-up. Plan of Care/Counseling:  I reviewed today's visit with the patient in addition to providing specific details for the plan of care and counseling regarding the diagnosis and prognosis. Questions are answered at this time and are agreeable with the plan. Assessment      1. Moderate left ankle sprain, initial encounter      Plan   Discharge home. Patient condition is good    New Medications     Discharge Medication List as of 5/2/2021 12:35 PM      START taking these medications    Details   ibuprofen (IBU) 400 MG tablet Take 1 tablet by mouth every 8 hours as needed for Pain Take with full glass of water, Disp-12 tablet, R-0Normal      acetaminophen (TYLENOL) 500 MG tablet Take 2 tablets by mouth every 8 hours as needed for Pain, Disp-50 tablet, R-0Normal           Electronically signed by Audrey Rodriguez MD   DD: 5/2/21  **This report was transcribed using voice recognition software. Every effort was made to ensure accuracy; however, inadvertent computerized transcription errors may be present.   END OF ED PROVIDER NOTE        Audrey Rodriguez MD  05/03/21 1050

## 2021-05-02 NOTE — ED NOTES
Ace wrap and air cast applied to left ankle. Circulation intact.       Jason Lozoya RN  05/02/21 [id number]

## 2021-05-19 ENCOUNTER — TELEPHONE (OUTPATIENT)
Dept: BREAST CENTER | Age: 46
End: 2021-05-19

## 2021-05-20 ENCOUNTER — TELEPHONE (OUTPATIENT)
Dept: SURGERY | Age: 46
End: 2021-05-20

## 2021-05-20 NOTE — TELEPHONE ENCOUNTER
MA received incoming call from patient in regards to her upcoming colonoscopy on 06/2/2021 with . Patient states she needs to R/S D/T having to work. MA advised she will cancel it at this time and when dates open up for July and August will call to maggi her rescheduled. MA called surgery scheduling and spoke to Sergio granados and canceled patient colonoscopy.          Electronically signed by Yomi Lewis MA on 5/20/21 at 8:38 AM EDT

## 2021-06-01 ENCOUNTER — TELEPHONE (OUTPATIENT)
Dept: BREAST CENTER | Age: 46
End: 2021-06-01

## 2021-06-01 NOTE — TELEPHONE ENCOUNTER
Called patient who states she is ready for her breast MRI. She started her cycle today. States she will get her labs drawn tomorrow. We will call to get it authorized so we can schedule it soon.

## 2021-06-03 ENCOUNTER — HOSPITAL ENCOUNTER (OUTPATIENT)
Age: 46
Discharge: HOME | End: 2021-06-03
Payer: MEDICAID

## 2021-06-03 DIAGNOSIS — Z12.39 BREAST CANCER SCREENING, HIGH RISK PATIENT: ICD-10-CM

## 2021-06-03 LAB
BUN BLDV-MCNC: 9 MG/DL (ref 6–20)
CREAT SERPL-MCNC: 0.9 MG/DL (ref 0.5–1)
GFR AFRICAN AMERICAN: >60
GFR NON-AFRICAN AMERICAN: >60 ML/MIN/1.73

## 2021-06-03 PROCEDURE — 84520 ASSAY OF UREA NITROGEN: CPT

## 2021-06-03 PROCEDURE — 36415 COLL VENOUS BLD VENIPUNCTURE: CPT

## 2021-06-03 PROCEDURE — 82565 ASSAY OF CREATININE: CPT

## 2021-06-10 ENCOUNTER — HOSPITAL ENCOUNTER (OUTPATIENT)
Dept: MRI IMAGING | Age: 46
Discharge: HOME | End: 2021-06-12
Payer: MEDICAID

## 2021-06-10 ENCOUNTER — TELEPHONE (OUTPATIENT)
Dept: BREAST CENTER | Age: 46
End: 2021-06-10

## 2021-06-10 ENCOUNTER — TELEPHONE (OUTPATIENT)
Dept: SURGERY | Age: 46
End: 2021-06-10

## 2021-06-10 DIAGNOSIS — R92.2 DENSE BREAST TISSUE ON MAMMOGRAM: ICD-10-CM

## 2021-06-10 DIAGNOSIS — R92.2 DENSE BREAST: ICD-10-CM

## 2021-06-10 DIAGNOSIS — Z80.3 FAMILY HISTORY OF BREAST CANCER IN FIRST DEGREE RELATIVE: ICD-10-CM

## 2021-06-10 DIAGNOSIS — Z91.89 AT HIGH RISK FOR BREAST CANCER: ICD-10-CM

## 2021-06-10 DIAGNOSIS — Z12.39 BREAST CANCER SCREENING, HIGH RISK PATIENT: ICD-10-CM

## 2021-06-10 DIAGNOSIS — N64.89 PSEUDOANGIOMATOUS STROMAL HYPERPLASIA OF BREAST: ICD-10-CM

## 2021-06-10 PROCEDURE — 77049 MRI BREAST C-+ W/CAD BI: CPT

## 2021-06-10 PROCEDURE — A9585 GADOBUTROL INJECTION: HCPCS | Performed by: RADIOLOGY

## 2021-06-10 PROCEDURE — 6360000004 HC RX CONTRAST MEDICATION: Performed by: RADIOLOGY

## 2021-06-10 RX ADMIN — GADOBUTROL 10 ML: 604.72 INJECTION INTRAVENOUS at 07:59

## 2021-06-27 ENCOUNTER — HOSPITAL ENCOUNTER (EMERGENCY)
Age: 46
Discharge: HOME | End: 2021-06-27
Payer: MEDICAID

## 2021-06-27 VITALS
HEIGHT: 60 IN | RESPIRATION RATE: 16 BRPM | HEART RATE: 107 BPM | OXYGEN SATURATION: 97 % | WEIGHT: 200 LBS | DIASTOLIC BLOOD PRESSURE: 69 MMHG | TEMPERATURE: 97.8 F | SYSTOLIC BLOOD PRESSURE: 109 MMHG | BODY MASS INDEX: 39.27 KG/M2

## 2021-06-27 DIAGNOSIS — J01.01 ACUTE RECURRENT MAXILLARY SINUSITIS: Primary | ICD-10-CM

## 2021-06-27 PROCEDURE — 99283 EMERGENCY DEPT VISIT LOW MDM: CPT

## 2021-06-27 RX ORDER — FLUTICASONE PROPIONATE 50 MCG
2 SPRAY, SUSPENSION (ML) NASAL DAILY
Qty: 1 BOTTLE | Refills: 0 | Status: SHIPPED | OUTPATIENT
Start: 2021-06-27 | End: 2021-08-12

## 2021-06-27 RX ORDER — AMOXICILLIN AND CLAVULANATE POTASSIUM 875; 125 MG/1; MG/1
1 TABLET, FILM COATED ORAL 2 TIMES DAILY WITH MEALS
Qty: 20 TABLET | Refills: 0 | Status: SHIPPED | OUTPATIENT
Start: 2021-06-27 | End: 2021-07-07

## 2021-06-27 NOTE — ED PROVIDER NOTES
[address]  Department of Emergency Medicine   ED  Encounter Note  Admit Date/RoomTime: 2021 10:16 AM  ED Room:     NAME: Sneha Ellsworth  : 1975  MRN: [id number]     Chief Complaint:  Sinusitis (states gets sinus infections chronicly, states has been ongoing for 6 weeks.)    History of Present Illness       Sneha Ellsworth is a 51 y.o. old female who presents to the emergency department by private vehicle, for nasal congestion, sore throat and cough, which began 6 week(s) prior to arrival.  Since onset the symptoms have been persistent and mild-moderate in severity. The symptoms are associated with dry cough, productive cough, nausea, earache and fatigue. There has been NO abdominal pain, chest tightness, conjunctivitis, watery eyes, vomiting, diarrhea, dizziness, dysuria, urinary frequency or joint swelling. Patient states that she has a history of sinus issues. This problem has been going on for about 6 weeks and she has not gone to see anyone. She has in the past for the same problems. She has also seen ENT for this problem. She also admits to some mild nausea. She states that she has tried loratadine, zyrtec, and pseudoephedrine with minimal relief. She states that leaning forward makes it worse. She denies fever, chills, difficultly swallowing, chest pain, vomiting. ROS   Pertinent positives and negatives are stated within HPI, all other systems reviewed and are negative. Past Medical History:  has a past medical history of Degenerative joint disease of low back, Migraine, and Sinusitis (chronic). Surgical History:  has a past surgical history that includes Cholecystectomy (age 24); Tonsillectomy (age 1); [place] tooth extraction; and Adenoidectomy. Social History:  reports that she has never smoked. She has never used smokeless tobacco. She reports current alcohol use. She reports that she does not use drugs.     Family History: family history includes Arthritis in her mother; Asthma in her mother; Breast Cancer in her maternal aunt, maternal aunt, maternal aunt, maternal cousin, maternal cousin, maternal cousin, maternal cousin, maternal cousin, maternal grandmother, maternal great grandmother, paternal aunt, paternal aunt, and paternal great grandmother; Cancer in her father, maternal aunt, maternal aunt, maternal cousin, paternal grandfather, and paternal grandmother; Cancer (age of onset: 29) in her mother and sister; Colon Cancer in her maternal aunt and maternal aunt; Colon Cancer (age of onset: 27) in her mother; Heart Disease in her mother; High Blood Pressure in her father; Migraines in her father, mother, and sister; Prostate Cancer in her paternal uncle; Stroke in her mother and sister. Allergies: Latex, Iodine, Trazodone and nefazodone, Bactrim, Iodides, Aspirin, Tylenol with codeine [acetaminophen-codeine], Ultram [tramadol hcl], and Vicodin tuss [hydrocodone-guaifenesin]    Physical Exam   Oxygen Saturation Interpretation: Normal on room air analysis. ED Triage Vitals   BP Temp Temp Source Pulse Resp SpO2 Height Weight   06/27/21 1019 06/27/21 1019 06/27/21 1019 06/27/21 1019 06/27/21 1019 06/27/21 1019 06/27/21 1025 06/27/21 1025   109/69 97.8 °F (36.6 °C) Infrared 107 16 97 % 5' (1.524 m) 200 lb (90.7 kg)         · Constitutional:  Alert, development consistent with age. · Ears:  External Ears: Bilateral normal.               TM's & External Canals: normal appearance. · Nose:   There is no discharge, swelling or lesions noted. · Sinuses: severe Bilateral maxillary sinus tenderness. severe Bilateral frontal sinus tenderness. · Mouth:  normal tongue and buccal mucosa. · Throat: no erythema or exudates noted. Teeth and gums normal..  Airway Patent. No evidence of trismus. No evidence of retropharyngeal or peritonsillar abscess. Patient is able to open and close mouth with ease.    · Neck: Supple. There is Bilateral  preauricular, submental, parotid, anterior cervical and posterior cervical node tenderness. · Respiratory:   Breath sounds: Bilateral normal.  Lung sounds: normal. There are no wheezes, rhonchi, or crackles. · CV:  Regular rate and rhythm, normal heart sounds, without pathological murmurs, ectopy, gallops, or rubs. · GI:  Abdomen Soft, nontender, good bowel sounds. No firm or pulsatile mass. · Integument:  Normal turgor. Warm, dry, without visible rash. · Neurological:  Oriented. Motor functions intact. Lab / Imaging Results   (All laboratory and radiology results have been personally reviewed by myself)  Labs:  No results found for this visit on 06/27/21. Imaging: All Radiology results interpreted by Radiologist unless otherwise noted. No orders to display       ED Course / Medical Decision Making   Medications - No data to display       Consults:   None    Procedures:   none    Medical Decision Making:    Patient is a 52 yo female who presents to the ED for sinus pain x6 weeks. She has tried pseudoephedrine, zyrtec, and loratidine with minimal relief. She has seen an ENT in the past for many episodes of similar symptoms. She is having some sputum production in which she describes as yellowish green. Based on low suspicion for pneumonia as per history/physical findings, imaging was not done. Based on low suspicion for COVID-19, testing was not done. Upper respiratory infection may  be viral in etiology. Antibiotics are indicated at this time based on clinical presentation and physical findings. She is not hypoxic. Patient is well appearing, non toxic and appropriate for outpatient management. She will be given Augmentin which is sent to her pharmacy. She was educated on the side effects and told to take them in full. She was also instructed to continue the pseudoephedrine and will take Flonase as well. She will return to the Ed will any worsening of symptoms.  She denies fever, chills, abdominal pain, chest pain, SOB, weakness in bilateral extremities, or bowel and bladder complaints. Patient is discharged with stable vitals, nontoxic in appearance and ready for outpatient follow up. Patient was explicitly instructed on specific signs and symptoms on which to return to the emergency room for. Patient was instructed to return to the ER for any new or worsening symptoms. Additional discharge instructions were given verbally. All questions were answered. Patient is comfortable and agreeable with discharge plan. Patient in no acute distress and non-toxic in appearance. Plan of Care/Counseling:  Prasanth Aldana PA-C reviewed today's visit with the patient in addition to providing specific details for the plan of care and counseling regarding the diagnosis and prognosis. Questions are answered at this time and are agreeable with the plan. Assessment     1. Acute recurrent maxillary sinusitis      Plan   Discharged home. Patient condition is stable    New Medications     New Prescriptions    AMOXICILLIN-CLAVULANATE (AUGMENTIN) 875-125 MG PER TABLET    Take 1 tablet by mouth 2 times daily (with meals) for 10 days    FLUTICASONE (FLONASE) 50 MCG/ACT NASAL SPRAY    2 sprays by Nasal route daily     Electronically signed by Prasanth Aldana PA-C   DD: 6/27/21  **This report was transcribed using voice recognition software. Every effort was made to ensure accuracy; however, inadvertent computerized transcription errors may be present.   END OF ED PROVIDER NOTE       Prasanth Aldana PA-C  06/27/21 1916

## 2021-06-30 ENCOUNTER — TELEPHONE (OUTPATIENT)
Dept: BREAST CENTER | Age: 46
End: 2021-06-30

## 2021-06-30 NOTE — TELEPHONE ENCOUNTER
Called patient and left message with call back number the need to change her appointment on 7/2/2021 due to provider availability. Will await call back.

## 2021-07-06 ENCOUNTER — TELEPHONE (OUTPATIENT)
Dept: BREAST CENTER | Age: 46
End: 2021-07-06

## 2021-07-15 ENCOUNTER — TELEPHONE (OUTPATIENT)
Dept: SURGERY | Age: 46
End: 2021-07-15

## 2021-07-15 NOTE — TELEPHONE ENCOUNTER
MA received a call from pt to reschedule her Colonoscopy with Dr. Junior Ray, pt states she had her dates wrong. Forwarding message to Toyin Kennedy MA to reschedule colonoscopy. Patient can be reached at [phone].   Electronically signed by Maria Teresa Ribera on 7/15/21 at 12:33 PM EDT

## 2021-07-16 ENCOUNTER — TELEPHONE (OUTPATIENT)
Dept: INTERNAL MEDICINE | Age: 46
End: 2021-07-16

## 2021-07-19 NOTE — TELEPHONE ENCOUNTER
MA contacted patient back to reschedule her colonoscopy. CANDICE Gould called and spoke to Lashawn and scheduled PT for colonoscopy on 9/29/2021 @ 9:30am with Dr. Aby Starkey. PT is not taking ASA/blood thinner products. PT has received both COVID 19 vaccines. PT verbalized she understood prep instructions, and NPO after midnight. PT verbalized that she understood appointment date/time, as well as to arrive 1 hours prior to procedure. PT advised PAT will call to go over all medication and advise on where to park and enter the hospital at for procedure. PT has been told to make sure they have a ride to and from procedure as they are not allowed to drive after procedure. PT procedure letter was mailed to them with all instructions also on it. MA instructed PT to call the office at [phone] with any questions, comments, or concerns about the procedure.

## 2021-07-27 ENCOUNTER — TELEPHONE (OUTPATIENT)
Dept: BREAST CENTER | Age: 46
End: 2021-07-27

## 2021-08-12 ENCOUNTER — OFFICE VISIT (OUTPATIENT)
Dept: FAMILY MEDICINE CLINIC | Age: 46
End: 2021-08-12
Payer: MEDICAID

## 2021-08-12 VITALS
SYSTOLIC BLOOD PRESSURE: 120 MMHG | HEIGHT: 60 IN | DIASTOLIC BLOOD PRESSURE: 78 MMHG | BODY MASS INDEX: 40.44 KG/M2 | WEIGHT: 206 LBS | RESPIRATION RATE: 16 BRPM | OXYGEN SATURATION: 97 % | TEMPERATURE: 97.6 F | HEART RATE: 102 BPM

## 2021-08-12 DIAGNOSIS — G43.109 MIGRAINE WITH AURA AND WITHOUT STATUS MIGRAINOSUS, NOT INTRACTABLE: Chronic | ICD-10-CM

## 2021-08-12 DIAGNOSIS — R11.0 NAUSEA: Primary | ICD-10-CM

## 2021-08-12 PROCEDURE — 1036F TOBACCO NON-USER: CPT | Performed by: NURSE PRACTITIONER

## 2021-08-12 PROCEDURE — G8427 DOCREV CUR MEDS BY ELIG CLIN: HCPCS | Performed by: NURSE PRACTITIONER

## 2021-08-12 PROCEDURE — G8417 CALC BMI ABV UP PARAM F/U: HCPCS | Performed by: NURSE PRACTITIONER

## 2021-08-12 PROCEDURE — 99213 OFFICE O/P EST LOW 20 MIN: CPT | Performed by: NURSE PRACTITIONER

## 2021-08-12 RX ORDER — ONDANSETRON 4 MG/1
4 TABLET, ORALLY DISINTEGRATING ORAL EVERY 8 HOURS PRN
Qty: 5 TABLET | Refills: 0 | Status: SHIPPED
Start: 2021-08-12 | End: 2021-09-23

## 2021-08-12 RX ORDER — RIMEGEPANT SULFATE 75 MG/75MG
75 TABLET, ORALLY DISINTEGRATING ORAL PRN
Qty: 5 TABLET | Refills: 0 | Status: SHIPPED
Start: 2021-08-12 | End: 2021-09-23

## 2021-08-12 RX ORDER — AMITRIPTYLINE HYDROCHLORIDE 150 MG/1
TABLET, FILM COATED ORAL
Qty: 30 TABLET | Refills: 0 | Status: SHIPPED
Start: 2021-08-12 | End: 2021-10-05 | Stop reason: SDUPTHER

## 2021-08-12 SDOH — ECONOMIC STABILITY: FOOD INSECURITY: WITHIN THE PAST 12 MONTHS, THE FOOD YOU BOUGHT JUST DIDN'T LAST AND YOU DIDN'T HAVE MONEY TO GET MORE.: NEVER TRUE

## 2021-08-12 SDOH — ECONOMIC STABILITY: FOOD INSECURITY: WITHIN THE PAST 12 MONTHS, YOU WORRIED THAT YOUR FOOD WOULD RUN OUT BEFORE YOU GOT MONEY TO BUY MORE.: NEVER TRUE

## 2021-08-12 ASSESSMENT — ENCOUNTER SYMPTOMS
VOMITING: 0
SHORTNESS OF BREATH: 0
WHEEZING: 0
COUGH: 0
CONSTIPATION: 0
DIARRHEA: 0
NAUSEA: 1

## 2021-08-12 ASSESSMENT — SOCIAL DETERMINANTS OF HEALTH (SDOH): HOW HARD IS IT FOR YOU TO PAY FOR THE VERY BASICS LIKE FOOD, HOUSING, MEDICAL CARE, AND HEATING?: NOT HARD AT ALL

## 2021-08-12 NOTE — LETTER
[facility]  [address]  [place] [address]  Phone: [phone]  Fax: [address] [facility], APRN - CNP        August 12, 2021     Patient: Kristy Barth   YOB: 1975   Date of Visit: 8/12/2021       To Whom it May Concern:    Carlo Caldwell was seen in my clinic on 8/12/2021. Please excuse her from work 8/11/2021-8/12/2021 She may return to work 8/13/2021 . If you have any questions or concerns, please don't hesitate to call.     Sincerely,         Noel Ash, NANCY - CNP

## 2021-08-12 NOTE — PROGRESS NOTES
Tash Wagner (:  1975) is a 51 y.o. female, here for evaluation of the following chief complaint(s):  Migraine (x 2 days / imitrex not helping / needs RTW (off for 2 days) )         ASSESSMENT/PLAN:  1. Nausea  -     ondansetron (ZOFRAN-ODT) 4 MG disintegrating tablet; Take 1 tablet by mouth every 8 hours as needed for Nausea or Vomiting, Disp-5 tablet, R-0Normal  2. Migraine with aura and without status migrainosus, not intractable  -     amitriptyline (ELAVIL) 150 MG tablet; TAKE ONE TABLET BY MOUTH NIGHTLY, Disp-30 tablet, R-0Normal  -     Rimegepant Sulfate (NURTEC) 75 MG TBDP; Take 75 mg by mouth as needed (migraine) x1 for migraine, Disp-5 tablet, R-0Normal  -  Reviewed side effects of medication and patient verbalizes understanding.   -  Continue Elavil  -  Stop Imitrex  -  Start with Nurtec for migraine relief  -  May have excuse for work  -  F/u with PCP as scheduled  -  Track your migraines      Return if symptoms worsen or fail to improve. Subjective   SUBJECTIVE/OBJECTIVE:  HPI  Here today for her migraine. Needs a work excuse for yesterday and today. She has had a migraine for about 2 days. Imitrex is not working. Does take her amitriptyline for migraines, almost out of Rx so she has not been taking it daily. Follows up with her PCP on . Review of Systems   Constitutional: Negative for activity change, appetite change, chills, diaphoresis and fever. Respiratory: Negative for cough, shortness of breath and wheezing. Gastrointestinal: Positive for nausea. Negative for constipation, diarrhea and vomiting. Genitourinary: Negative for difficulty urinating. Neurological: Positive for headaches. Negative for dizziness and weakness. Objective   Physical Exam  Constitutional:       Appearance: She is well-developed. HENT:      Head: Normocephalic and atraumatic. Neck:      Thyroid: No thyromegaly. Trachea: No tracheal deviation.    Cardiovascular:      Rate and Rhythm: Normal rate and regular rhythm. Heart sounds: No murmur heard. Pulmonary:      Effort: Pulmonary effort is normal.      Breath sounds: Normal breath sounds. Abdominal:      General: Bowel sounds are normal.      Palpations: Abdomen is soft. Tenderness: There is no abdominal tenderness. Musculoskeletal:         General: Normal range of motion. Lymphadenopathy:      Cervical: No cervical adenopathy. Skin:     General: Skin is warm and dry. Neurological:      Mental Status: She is alert and oriented to person, place, and time. Psychiatric:         Behavior: Behavior normal.            [facility]      An electronic signature was used to authenticate this note.     --Ashkan Ordaz, NANCY - CNP

## 2021-08-26 NOTE — TELEPHONE ENCOUNTER
Authorization for breast MRI [id number] has been obtained -- Approval # [id number] valid until 11/28/21. Split-Thickness Skin Graft Text: The defect edges were debeveled with a #15 scalpel blade.  Given the location of the defect, shape of the defect and the proximity to free margins a split thickness skin graft was deemed most appropriate.  Using a sterile surgical marker, the primary defect shape was transferred to the donor site. The split thickness graft was then harvested.  The skin graft was then placed in the primary defect and oriented appropriately.

## 2021-09-02 ENCOUNTER — TELEPHONE (OUTPATIENT)
Dept: INTERNAL MEDICINE | Age: 46
End: 2021-09-02

## 2021-09-23 ENCOUNTER — TELEPHONE (OUTPATIENT)
Dept: INTERNAL MEDICINE | Age: 46
End: 2021-09-23

## 2021-09-23 ENCOUNTER — OFFICE VISIT (OUTPATIENT)
Dept: FAMILY MEDICINE CLINIC | Age: 46
End: 2021-09-23
Payer: MEDICAID

## 2021-09-23 VITALS
HEART RATE: 100 BPM | RESPIRATION RATE: 18 BRPM | HEIGHT: 60 IN | BODY MASS INDEX: 39.27 KG/M2 | OXYGEN SATURATION: 98 % | DIASTOLIC BLOOD PRESSURE: 76 MMHG | WEIGHT: 200 LBS | SYSTOLIC BLOOD PRESSURE: 102 MMHG | TEMPERATURE: 98.1 F

## 2021-09-23 DIAGNOSIS — G43.109 MIGRAINE WITH AURA AND WITHOUT STATUS MIGRAINOSUS, NOT INTRACTABLE: Primary | ICD-10-CM

## 2021-09-23 DIAGNOSIS — J34.89 SINUS PRESSURE: ICD-10-CM

## 2021-09-23 PROCEDURE — 1036F TOBACCO NON-USER: CPT | Performed by: STUDENT IN AN ORGANIZED HEALTH CARE EDUCATION/TRAINING PROGRAM

## 2021-09-23 PROCEDURE — G8427 DOCREV CUR MEDS BY ELIG CLIN: HCPCS | Performed by: STUDENT IN AN ORGANIZED HEALTH CARE EDUCATION/TRAINING PROGRAM

## 2021-09-23 PROCEDURE — 99213 OFFICE O/P EST LOW 20 MIN: CPT | Performed by: STUDENT IN AN ORGANIZED HEALTH CARE EDUCATION/TRAINING PROGRAM

## 2021-09-23 PROCEDURE — G8417 CALC BMI ABV UP PARAM F/U: HCPCS | Performed by: STUDENT IN AN ORGANIZED HEALTH CARE EDUCATION/TRAINING PROGRAM

## 2021-09-23 RX ORDER — LORATADINE AND PSEUDOEPHEDRINE SULFATE 5; 120 MG/1; MG/1
1 TABLET, EXTENDED RELEASE ORAL 2 TIMES DAILY PRN
Qty: 30 TABLET | Refills: 0 | Status: SHIPPED
Start: 2021-09-23 | End: 2021-10-05 | Stop reason: SDUPTHER

## 2021-09-23 NOTE — PROGRESS NOTES
Urgent Care       Patient:  Delio Sutherland 51 y.o. female     Date of Service: 9/23/21      Chief complaint:   Chief Complaint   Patient presents with    Migraine         History ofPresent Illness   The patient is a 51 y.o. female  presented to the clinic with complaints as above. Migraine  -new issue  -started 3 days ago  -right sided, feels like someone hit her in the back of the head with a bat  -somewhat improving, has tried imitrex as she has chronic issues with this, and it didn't help  -ibuprofen not helping  -did notice aura before the headache  -had associated nausea and vomiting  -pain continuing to subside, feels she can be ok for work tomorrow  -feels like she has some sinus pressure       Past Medical History:      Diagnosis Date    Degenerative joint disease of low back 2/11/2021    Migraine 20 years    Sinusitis (chronic)        PastSurgical History:        Procedure Laterality Date    ADENOIDECTOMY      CHOLECYSTECTOMY  age 24   [facility] TONSILLECTOMY  age 1   [facility] WISDOM TOOTH EXTRACTION         Allergies:    Latex, Iodine, Trazodone and nefazodone, Bactrim, Iodides, Aspirin, Tylenol with codeine [acetaminophen-codeine], Ultram [tramadol hcl], and Vicodin tuss [hydrocodone-guaifenesin]    Social History:   Social History     Socioeconomic History    Marital status: Single     Spouse name: Not on file    Number of children: Not on file    Years of education: Not on file    Highest education level: Not on file   Occupational History    Not on file   Tobacco Use    Smoking status: Never Smoker    Smokeless tobacco: Never Used   Vaping Use    Vaping Use: Never used   Substance and Sexual Activity    Alcohol use:  Yes     Alcohol/week: 0.0 standard drinks     Comment: occ    Drug use: No    Sexual activity: Not Currently     Partners: Male   Other Topics Concern    Not on file   Social History Narrative    Not on file     Social Determinants of Health     Financial Resource Strain: Low Risk  Difficulty of Paying Living Expenses: Not hard at all   Food Insecurity: No Food Insecurity    Worried About Running Out of Food in the Last Year: Never true    Ran Out of Food in the Last Year: Never true   Transportation Needs:     Lack of Transportation (Medical):      Lack of Transportation (Non-Medical):    Physical Activity:     Days of Exercise per Week:     Minutes of Exercise per Session:    Stress:     Feeling of Stress :    Social Connections:     Frequency of Communication with Friends and Family:     Frequency of Social Gatherings with Friends and Family:     Attends [employer] Services:     Active Member of Clubs or Organizations:     Attends Club or Organization Meetings:     Marital Status:    Intimate Partner Violence:     Fear of Current or Ex-Partner:     Emotionally Abused:     Physically Abused:     Sexually Abused:         Family History:       Problem Relation Age of Onset    Cancer Mother 29        breast, bilateral, more than once; gastric cancer    Heart Disease Mother     Asthma Mother     Stroke Mother     Migraines Mother     Arthritis Mother     Colon Cancer Mother 27        rectal cancer    High Blood Pressure Father     Cancer Father         prostate    Migraines Father     Cancer Sister 29        breast and ovarian    Stroke Sister     Breast Cancer Maternal Grandmother     Cancer Paternal Grandmother         breast    Cancer Paternal Grandfather         prostate    Migraines Sister     Breast Cancer Maternal Aunt     Cancer Maternal Aunt         ovarian cancer    Colon Cancer Maternal Aunt     Breast Cancer Paternal Aunt     Prostate Cancer Paternal Uncle     Breast Cancer Maternal Cousin         under age of 48    Cancer Maternal Cousin         ovarian    Breast Cancer Maternal Aunt     Cancer Maternal Aunt         ovarian    Colon Cancer Maternal Aunt     Breast Cancer Maternal Aunt     Breast Cancer Paternal Clifcassandra Gideonks     Breast Cancer Maternal Great Grandmother     Breast Cancer Paternal Aunt     Breast Cancer Maternal Cousin         under age of 48    Breast Cancer Maternal Cousin         under age of 48    Breast Cancer Maternal Cousin         under age of 48    Breast Cancer Maternal Cousin         under age of 48       Review of Systems:   Review of Systems - as above     Physical Exam   Vitals: /76   Pulse 100   Temp 98.1 °F (36.7 °C) (Oral)   Resp 18   Ht 5' (1.524 m)   Wt 200 lb (90.7 kg)   SpO2 98%   BMI 39.06 kg/m²   Physical Exam  Constitutional:       Appearance: She is well-developed. HENT:      Head: Normocephalic. Cardiovascular:      Rate and Rhythm: Normal rate and regular rhythm. Heart sounds: Normal heart sounds. No murmur heard. Pulmonary:      Effort: Pulmonary effort is normal. No respiratory distress. Breath sounds: Normal breath sounds. No wheezing. Abdominal:      General: Bowel sounds are normal.      Palpations: Abdomen is soft. Musculoskeletal:         General: No tenderness. Normal range of motion. Skin:     General: Skin is warm and dry. Neurological:      General: No focal deficit present. Mental Status: She is alert and oriented to person, place, and time. Cranial Nerves: Cranial nerves are intact. No cranial nerve deficit or facial asymmetry. Motor: No weakness. Coordination: Romberg sign negative. Coordination normal. Finger-Nose-Finger Test normal.   Psychiatric:         Behavior: Behavior normal.         Assessment and Plan       1. Migraine with aura and without status migrainosus, not intractable  New issue  -Started several days ago, is improving somewhat, therefore will just treat sinus pressure as supportive  -Return to work let printed and given to patient, can return tomorrow    2. Sinus pressure  Discussion as above   - loratadine-pseudoephedrine (CLARITIN-D 12 HOUR) 5-120 MG per extended release tablet;  Take 1 tablet by mouth 2 times daily as needed (sinus pressure)  Dispense: 30 tablet; Refill: 0      Counseled regarding above diagnosis, including possible risks and complications,  especially if left uncontrolled. Counseled regarding the possible side effects, risks, benefits and alternatives to treatment;patient and/or guardian verbalizes understanding, agrees, feels comfortable with and wishes to proceed with above treatment plan. Call or go to [address] [place] if symptoms worsen or persist. Advised patient to call with any new medication issues, and, as applicable, read all Rx info from pharmacy to assure aware of all possible risks and side effects of medicationbefore taking. Patient and/or guardian given opportunity to ask questions/raise concerns. The patient verbalized comfort and understanding ofinstructions. I encourage further reading and education about your health conditions. Information on many health conditions is provided by [facility] [employer]: [email or web address]  Please bring any questions to me at your nextvisit. Return to Office: Return if symptoms worsen or fail to improve. Medication List:    Current Outpatient Medications   Medication Sig Dispense Refill    loratadine-pseudoephedrine (CLARITIN-D 12 HOUR) 5-120 MG per extended release tablet Take 1 tablet by mouth 2 times daily as needed (sinus pressure) 30 tablet 0    amitriptyline (ELAVIL) 150 MG tablet TAKE ONE TABLET BY MOUTH NIGHTLY 30 tablet 0    ibuprofen (IBU) 400 MG tablet Take 1 tablet by mouth every 8 hours as needed for Pain Take with full glass of water 12 tablet 0    acetaminophen (TYLENOL) 500 MG tablet Take 2 tablets by mouth every 8 hours as needed for Pain 50 tablet 0    Ascorbic Acid (VITAMIN C) 250 MG tablet Take 500 mg by mouth daily      Cholecalciferol (VITAMIN D3) 50 MCG (2000 UT) CAPS Take 1 capsule by mouth daily      SUMAtriptan (IMITREX) 100 MG tablet Take one tablet  prn headache.  May repeat in 2 hours. No more than 200 mg in 24 hours. Do not treat greater than 4 headaches in 30 days. 20 tablet 0    mineral oil-hydrophilic petrolatum (AQUAPHOR) ointment APPLY TOPICALLY AS NEEDED 52.5 g 3    chlorpheniramine (EQ CHLORTABS) 4 MG tablet Take 1 tablet by mouth every 6 hours as needed for Allergies 120 tablet 3    MULTIPLE VITAMINS PO Take 1 tablet by mouth daily      diclofenac sodium 1 % GEL Apply 2 g topically 4 times daily 2 Tube 1    Multiple Vitamins-Minerals (MULTIVITAMIN GUMMIES ADULT PO) Take 1 tablet by mouth daily Per pt. Vitamin has Vit C, Vit A, Vit B12, Biotin, Vit D, Iron.  Misc. Devices MISC Please provide braces for carpal tunnel syndrome, bilateral. 2 Device 0    melatonin 3 MG TABS tablet Take 3 mg by mouth daily      Rimegepant Sulfate (NURTEC) 75 MG TBDP Take 75 mg by mouth as needed (migraine) x1 for migraine (Patient not taking: Reported on 9/23/2021) 5 tablet 0    ondansetron (ZOFRAN-ODT) 4 MG disintegrating tablet Take 1 tablet by mouth every 8 hours as needed for Nausea or Vomiting (Patient not taking: Reported on 9/23/2021) 5 tablet 0    bisacodyl (BISACODYL) 5 MG EC tablet Take 2 tablets by mouth 2 times daily (Patient not taking: Reported on 9/23/2021) 2 tablet 0    bisacodyl (DULCOLAX) 5 MG EC tablet Take 2 tablets by mouth daily (Patient not taking: Reported on 9/23/2021) 2 tablet 0    diphenhydrAMINE (SOMINEX) 25 MG tablet Take 1 tablet by mouth daily as needed (Patient not taking: Reported on 9/23/2021)       No current facility-administered medications for this visit. [employer], DO       This document may have been prepared at least partially through the use of voice recognition software. Although effort is taken to assure the accuracy ofthis document, it is possible that grammatical, syntax,  or spelling errors may occur.

## 2021-09-23 NOTE — LETTER
[facility]  [address] [place], [place] [id number]  Phone: [phone]  Fax: [phone], [address]        September 23, 2021     Patient: Andreia Ignacio   YOB: 1975   Date of Visit: 9/23/2021       To Whom it May Concern:    Madelyn Miner was seen in my clinic on 9/23/2021. She may return to work 9/24/2021. If you have any questions or concerns, please don't hesitate to call.     Sincerely,         SCHEDULE, [address] [address]

## 2021-09-28 ENCOUNTER — TELEPHONE (OUTPATIENT)
Dept: FAMILY MEDICINE CLINIC | Age: 46
End: 2021-09-28

## 2021-09-28 NOTE — TELEPHONE ENCOUNTER
MA received voicemail from pt requesting to cancel her procedure scheduled for tomorrow, 9/29/21 with Dr. Baron Giordano, due to work. Pt stated she would call back when she was ready to reschedule. MA spoke with Aide from surg scheduling to asked her to cancel procedure.     Electronically signed by Asif Almanza MA on 9/28/2021 at 11:32 AM

## 2021-09-29 NOTE — TELEPHONE ENCOUNTER
Patient will need an office visit to see Rita Zuleta and discuss everything before being rescheduled for her colonoscopy.        Electronically signed by Shirlene Conte MA on 9/29/21 at 7:52 AM EDT

## 2021-10-05 ENCOUNTER — OFFICE VISIT (OUTPATIENT)
Dept: OBGYN | Age: 46
End: 2021-10-05
Payer: MEDICAID

## 2021-10-05 ENCOUNTER — OFFICE VISIT (OUTPATIENT)
Dept: INTERNAL MEDICINE | Age: 46
End: 2021-10-05
Payer: MEDICAID

## 2021-10-05 VITALS
TEMPERATURE: 97.1 F | HEIGHT: 60 IN | OXYGEN SATURATION: 100 % | RESPIRATION RATE: 20 BRPM | DIASTOLIC BLOOD PRESSURE: 80 MMHG | WEIGHT: 206.9 LBS | SYSTOLIC BLOOD PRESSURE: 110 MMHG | BODY MASS INDEX: 40.62 KG/M2 | HEART RATE: 98 BPM

## 2021-10-05 VITALS
SYSTOLIC BLOOD PRESSURE: 110 MMHG | DIASTOLIC BLOOD PRESSURE: 80 MMHG | WEIGHT: 212 LBS | HEART RATE: 98 BPM | BODY MASS INDEX: 41.4 KG/M2

## 2021-10-05 DIAGNOSIS — G43.109 MIGRAINE WITH AURA AND WITHOUT STATUS MIGRAINOSUS, NOT INTRACTABLE: Chronic | ICD-10-CM

## 2021-10-05 DIAGNOSIS — E66.9 OBESITY (BMI 30-39.9): ICD-10-CM

## 2021-10-05 DIAGNOSIS — I95.1 ORTHOSTATIC HYPOTENSION: ICD-10-CM

## 2021-10-05 DIAGNOSIS — L20.82 FLEXURAL ECZEMA: ICD-10-CM

## 2021-10-05 DIAGNOSIS — Z01.419 WOMEN'S ANNUAL ROUTINE GYNECOLOGICAL EXAMINATION: Primary | ICD-10-CM

## 2021-10-05 DIAGNOSIS — J34.89 SINUS PRESSURE: ICD-10-CM

## 2021-10-05 DIAGNOSIS — Z80.3 FAMILY HISTORY OF BREAST CANCER: ICD-10-CM

## 2021-10-05 DIAGNOSIS — E55.9 VITAMIN D DEFICIENCY: Primary | ICD-10-CM

## 2021-10-05 DIAGNOSIS — R00.0 TACHYCARDIA: ICD-10-CM

## 2021-10-05 PROBLEM — M25.562 ACUTE PAIN OF LEFT KNEE: Status: RESOLVED | Noted: 2019-07-30 | Resolved: 2021-10-05

## 2021-10-05 PROCEDURE — G8482 FLU IMMUNIZE ORDER/ADMIN: HCPCS | Performed by: CHIROPRACTOR

## 2021-10-05 PROCEDURE — 6360000002 HC RX W HCPCS

## 2021-10-05 PROCEDURE — G8427 DOCREV CUR MEDS BY ELIG CLIN: HCPCS | Performed by: STUDENT IN AN ORGANIZED HEALTH CARE EDUCATION/TRAINING PROGRAM

## 2021-10-05 PROCEDURE — 99212 OFFICE O/P EST SF 10 MIN: CPT | Performed by: CHIROPRACTOR

## 2021-10-05 PROCEDURE — G8427 DOCREV CUR MEDS BY ELIG CLIN: HCPCS | Performed by: CHIROPRACTOR

## 2021-10-05 PROCEDURE — 99213 OFFICE O/P EST LOW 20 MIN: CPT | Performed by: STUDENT IN AN ORGANIZED HEALTH CARE EDUCATION/TRAINING PROGRAM

## 2021-10-05 PROCEDURE — 99214 OFFICE O/P EST MOD 30 MIN: CPT | Performed by: CHIROPRACTOR

## 2021-10-05 PROCEDURE — 90686 IIV4 VACC NO PRSV 0.5 ML IM: CPT

## 2021-10-05 PROCEDURE — 1036F TOBACCO NON-USER: CPT | Performed by: STUDENT IN AN ORGANIZED HEALTH CARE EDUCATION/TRAINING PROGRAM

## 2021-10-05 PROCEDURE — 99386 PREV VISIT NEW AGE 40-64: CPT | Performed by: STUDENT IN AN ORGANIZED HEALTH CARE EDUCATION/TRAINING PROGRAM

## 2021-10-05 PROCEDURE — G8417 CALC BMI ABV UP PARAM F/U: HCPCS | Performed by: STUDENT IN AN ORGANIZED HEALTH CARE EDUCATION/TRAINING PROGRAM

## 2021-10-05 PROCEDURE — G8417 CALC BMI ABV UP PARAM F/U: HCPCS | Performed by: CHIROPRACTOR

## 2021-10-05 PROCEDURE — 1036F TOBACCO NON-USER: CPT | Performed by: CHIROPRACTOR

## 2021-10-05 PROCEDURE — G0008 ADMIN INFLUENZA VIRUS VAC: HCPCS

## 2021-10-05 RX ORDER — SUMATRIPTAN 100 MG/1
TABLET, FILM COATED ORAL
Qty: 30 TABLET | Refills: 1 | Status: SHIPPED
Start: 2021-10-05 | End: 2022-04-12 | Stop reason: SDUPTHER

## 2021-10-05 RX ORDER — DIPHENHYDRAMINE HCL 25 MG
1 CAPSULE ORAL 2 TIMES DAILY
Qty: 1 EACH | Refills: 1 | Status: SHIPPED
Start: 2021-10-05 | End: 2022-04-12

## 2021-10-05 RX ORDER — AMITRIPTYLINE HYDROCHLORIDE 150 MG/1
TABLET, FILM COATED ORAL
Qty: 90 TABLET | Refills: 1 | Status: SHIPPED
Start: 2021-10-05 | End: 2022-01-28 | Stop reason: SDUPTHER

## 2021-10-05 RX ORDER — LORATADINE AND PSEUDOEPHEDRINE SULFATE 5; 120 MG/1; MG/1
1 TABLET, EXTENDED RELEASE ORAL 2 TIMES DAILY PRN
Qty: 60 TABLET | Refills: 3 | Status: SHIPPED
Start: 2021-10-05 | End: 2022-04-12 | Stop reason: ALTCHOICE

## 2021-10-05 RX ORDER — FLUTICASONE PROPIONATE 50 MCG
1 SPRAY, SUSPENSION (ML) NASAL DAILY
Qty: 16 G | Refills: 2 | Status: SHIPPED
Start: 2021-10-05 | End: 2022-04-12

## 2021-10-05 ASSESSMENT — ENCOUNTER SYMPTOMS
DIARRHEA: 0
SHORTNESS OF BREATH: 0
NAUSEA: 0
COUGH: 0
ABDOMINAL PAIN: 0
WHEEZING: 0
BLOOD IN STOOL: 0
CONSTIPATION: 0
CHEST TIGHTNESS: 0
EYE PAIN: 0
SINUS PAIN: 0
VOMITING: 0

## 2021-10-05 NOTE — PROGRESS NOTES
Patient verbalized understanding of office instructions. She will call with questions or concerns. She was given discharge instructions, and scripts for fasting lab work to be done. All questions were fully answered. Flu vaccine given per ordered without no distress noted.    Printed AVS and VIS was given

## 2021-10-05 NOTE — PATIENT INSTRUCTIONS
Dear Maria Teresa Crawley,        Thank you for coming to your appointment today. I hope we have addressed all of your needs. Please make sure to do the following:  - Continue your medications as listed. - Get labs drawn before our next follow up. We will call you for results. - Please reschedule your colonoscopy  - We will see each other again in 3 months    Call for a sooner appointment if you develop dizziness, shortness of breath,, fever, chills    Have a great day!         Sincerely,  Isabelle Vasquez MD  10/5/2021  8:57 AM

## 2021-10-05 NOTE — PROGRESS NOTES
[facility] Internal Medicine      SUBJECTIVE:  Kitty Louise (:  1975) is a 51 y.o. female here for evaluation of the following chief complaint(s):  Medication Refill and Check-Up (states feels \"dehydrated \" eyes are dry)          HPI: pt is 52 y/o female presented for routine office visit. She complains of eye and mouth dryness. She is also concern about about being a passive smoker. She also states she feels dehydrated. She denies, polyuria, fever,chills, SOB, abdominal pian, N/V/D       Review of Systems   Constitutional: Negative for appetite change, chills, fatigue, fever and unexpected weight change. HENT: Positive for congestion. Negative for hearing loss and sinus pain. Eyes: Negative for pain and visual disturbance. Respiratory: Negative for cough, chest tightness, shortness of breath and wheezing. Cardiovascular: Negative for chest pain, palpitations and leg swelling. Gastrointestinal: Negative for abdominal pain, blood in stool, constipation, diarrhea, nausea and vomiting. Genitourinary: Negative for dysuria, flank pain, frequency and hematuria. Skin: Positive for rash. Rash on both hands   Allergic/Immunologic: Negative for environmental allergies and food allergies. Neurological: Negative for dizziness, weakness, numbness and headaches. PMHx:  has a past medical history of Degenerative joint disease of low back, Migraine, and Sinusitis (chronic).      Allergies   Allergen Reactions    Latex Anaphylaxis, Hives and Rash    Iodine Hives    Trazodone And Nefazodone Nausea And Vomiting    Bactrim Hives    Iodides Hives    Aspirin Hives and Nausea And Vomiting    Tylenol With Codeine [Acetaminophen-Codeine] Nausea And Vomiting    Ultram [Tramadol Hcl] Nausea And Vomiting    Vicodin Tuss [Hydrocodone-Guaifenesin] Nausea And Vomiting        PSHx:  has a past surgical history that includes Cholecystectomy (age 24); Tonsillectomy (age 1); [place] tooth extraction; Adenoidectomy; and Breast biopsy (approx 2020). Social Hx:   Social History     Tobacco History     Smoking Status  Never Smoker    Smokeless Tobacco Use  Never Used          Alcohol History     Alcohol Use Status  Yes Drinks/Week  0 Standard drinks or equivalent per week Amount  0.0 standard drinks of alcohol/wk Comment  rarely           Drug Use     Drug Use Status  No          Sexual Activity     Sexually Active  Not Currently Partners  Male                 Fam Hx:   Family History   Problem Relation Age of Onset    Cancer Mother 29        breast, bilateral, more than once; gastric cancer    Heart Disease Mother     Asthma Mother     Stroke Mother     Migraines Mother     Arthritis Mother     Colon Cancer Mother 27        rectal cancer    High Blood Pressure Father     Cancer Father         prostate    Migraines Father     Cancer Sister 29        breast and ovarian    Stroke Sister     Breast Cancer Maternal Grandmother     Cancer Paternal Grandmother         breast    Cancer Paternal Grandfather         prostate    Migraines Sister     Breast Cancer Maternal Aunt     Cancer Maternal Aunt         ovarian cancer    Colon Cancer Maternal Aunt     Breast Cancer Paternal Aunt     Prostate Cancer Paternal Uncle     Breast Cancer Maternal Cousin         under age of 48    Cancer Maternal Cousin         ovarian    Breast Cancer Maternal Aunt     Cancer Maternal Aunt         ovarian    Colon Cancer Maternal Aunt     Breast Cancer Maternal Aunt     Breast Cancer Paternal Great Grandmother     Breast Cancer Maternal Great Grandmother     Breast Cancer Paternal Aunt     Breast Cancer Maternal Cousin         under age of 48    Breast Cancer Maternal Cousin         under age of 48    Breast Cancer Maternal Cousin         under age of 48    Breast Cancer Maternal Cousin         under age of 48          Key points to note:   To check: Lipid profile, Hb A1c, Vit D   Started on Flonase for allergic rhinitis   Recommenced: on colonoscopy      Current Outpatient Medications on File Prior to Visit   Medication Sig Dispense Refill    NONFORMULARY Take by mouth daily Gummie for hair skin and nails      ibuprofen (IBU) 400 MG tablet Take 1 tablet by mouth every 8 hours as needed for Pain Take with full glass of water 12 tablet 0    Ascorbic Acid (VITAMIN C) 250 MG tablet Take 500 mg by mouth daily Gummie      Cholecalciferol (VITAMIN D3) 50 MCG (2000 UT) CAPS Take 1 capsule by mouth daily Gummie      MULTIPLE VITAMINS PO Take 1 tablet by mouth daily Gummie      melatonin 3 MG TABS tablet Take 3 mg by mouth nightly as needed       acetaminophen (TYLENOL) 500 MG tablet Take 2 tablets by mouth every 8 hours as needed for Pain 50 tablet 0     No current facility-administered medications on file prior to visit. OBJECTIVE:    VS:   Vitals:    10/05/21 0805 10/05/21 0847 10/05/21 0850 10/05/21 0852   BP: 103/74 102/76 108/86 110/80   Site:  Right Upper Arm Right Upper Arm Right Upper Arm   Position:  Supine Sitting Standing   Pulse: 103 58 92 98   Resp: 18 16 18 20   Temp: 97.1 °F (36.2 °C)      TempSrc: Temporal      SpO2: 100%      Weight: 206 lb 14.4 oz (93.8 kg)      Height: 5' (1.524 m)        Physical Exam  Constitutional:       General: She is not in acute distress. Comments: Positive orthostatic hypotension (>30 increase in VA)   HENT:      Head: Normocephalic. Right Ear: External ear normal.      Left Ear: External ear normal.      Nose: Congestion and rhinorrhea present. Mouth/Throat:      Mouth: Mucous membranes are moist.      Comments: No dental carries   Eyes:      General:         Right eye: No discharge. Left eye: No discharge. Conjunctiva/sclera: Conjunctivae normal.      Comments: Moist    Cardiovascular:      Rate and Rhythm: Normal rate and regular rhythm. Heart sounds: No murmur heard.      Pulmonary: Effort: Pulmonary effort is normal. No respiratory distress. Breath sounds: Normal breath sounds. No wheezing, rhonchi or rales. Abdominal:      General: Bowel sounds are normal. There is no distension. Palpations: Abdomen is soft. There is no mass. Tenderness: There is no abdominal tenderness. There is no guarding. Musculoskeletal:      Cervical back: Neck supple. No tenderness. Right lower leg: No edema. Left lower leg: No edema. Lymphadenopathy:      Cervical: No cervical adenopathy. Skin:     General: Skin is warm. Coloration: Skin is not pale. Findings: No erythema. Comments: Eczema on the dorsum of the hands    Neurological:      General: No focal deficit present. Mental Status: She is alert and oriented to person, place, and time. Psychiatric:         Mood and Affect: Mood normal.         Behavior: Behavior normal.            ASSESSMENT/PLAN:    Migraine headache,   · 1 Episode per week  · On amitriptyline,   · On Imitrex PRN     Allergic rhinitis    · Claritin D, PRN  · Started Flonase for the pt     Eczema  · On the dorsum of the hands  · On hydrocortisone ointment and      Tachycardia   · IA: 103   · Positive orthostatic for HR   · Pt was advise on increasing fluid intake     Health maintenance   · Flu vaccine given  · Given the positive family Hx of cancer, the importance of colonoscopy was discussed     RTC:  Return in about 3 months (around 1/5/2022) for PCP appointment. I have reviewed my findings and recommendations with Alayna Garcias and Dr. Dewayne Easley.     Danny Moy MD   10/5/2021 3:29 PM

## 2021-10-05 NOTE — PROGRESS NOTES
Gynecologic History and Physical       CHIEF COMPLAINT:  Annual exam    HISTORY OF PRESENT ILLNESS:      Salazar Grijalva is a 51 y. o.  female, who presents for an annual exam  Doing well without complaints  Does have a h/o irregular periods in the past, but since stopping DEPO 3-4 years ago, has been having monthly regular periods. She denies dysmenorrhea. She denies irregular spotting  She has not been sexually active in 10 years  She denies urinary leakage  She had a recent mammogram/MRI in 2021 that was normal. High risk for cancer due to family history and calcifications on prior imaging. H/O breast biopsy  Last PAP 2019  Denies prior STD history     Past Medical History:        Diagnosis Date    Degenerative joint disease of low back 2021    Migraine 20 years    Sinusitis (chronic)        Past Surgical History:        Procedure Laterality Date    ADENOIDECTOMY      BREAST BIOPSY  approx     Patient not sure which site    CHOLECYSTECTOMY  age 24   Ethlyn Neel TONSILLECTOMY  age 1   Ethlyn Neel WISDOM TOOTH EXTRACTION         Allergies:  Latex, Iodine, Trazodone and nefazodone, Bactrim, Iodides, Aspirin, Tylenol with codeine [acetaminophen-codeine], Ultram [tramadol hcl], and Vicodin tuss [hydrocodone-guaifenesin]    Social History:    Social History     Socioeconomic History    Marital status: Single     Spouse name: Not on file    Number of children: Not on file    Years of education: Not on file    Highest education level: Not on file   Occupational History    Not on file   Tobacco Use    Smoking status: Passive Smoke Exposure - Never Smoker    Smokeless tobacco: Never Used   Vaping Use    Vaping Use: Never used   Substance and Sexual Activity    Alcohol use:  Yes     Alcohol/week: 0.0 standard drinks     Comment: rarely     Drug use: No    Sexual activity: Not Currently     Partners: Male   Other Topics Concern    Not on file   Social History Narrative    Not on file     Social Determinants of Health     Financial Resource Strain: Low Risk     Difficulty of Paying Living Expenses: Not hard at all   Food Insecurity: No Food Insecurity    Worried About Running Out of Food in the Last Year: Never true    Juancarlos of Food in the Last Year: Never true   Transportation Needs:     Lack of Transportation (Medical):  Lack of Transportation (Non-Medical):    Physical Activity:     Days of Exercise per Week:     Minutes of Exercise per Session:    Stress:     Feeling of Stress :    Social Connections:     Frequency of Communication with Friends and Family:     Frequency of Social Gatherings with Friends and Family:     Attends [employer] Services:     Active Member of Clubs or Organizations:     Attends Club or Organization Meetings:     Marital Status:    Intimate Partner Violence:     Fear of Current or Ex-Partner:     Emotionally Abused:     Physically Abused:     Sexually Abused:        GYN History: Patient's last menstrual period was 2021.     Last PAP    Family History:       Problem Relation Age of Onset    Cancer Mother 29        breast, bilateral, more than once; gastric cancer    Heart Disease Mother     Asthma Mother     Stroke Mother     Migraines Mother     Arthritis Mother     Colon Cancer Mother 27        rectal cancer    High Blood Pressure Father     Cancer Father         prostate    Migraines Father     Cancer Sister 29        breast and ovarian    Stroke Sister     Breast Cancer Maternal Grandmother     Cancer Paternal Grandmother         breast    Cancer Paternal Grandfather         prostate    Migraines Sister     Breast Cancer Maternal Aunt     Cancer Maternal Aunt         ovarian cancer    Colon Cancer Maternal Aunt     Breast Cancer Paternal Aunt     Prostate Cancer Paternal Uncle     Breast Cancer Maternal Cousin         under age of 48    Cancer Maternal Cousin         ovarian    Breast Cancer Maternal [place] Maternal Aunt         ovarian    Colon Cancer Maternal Aunt     Breast Cancer Maternal Aunt     Breast Cancer Paternal Great Grandmother     Breast Cancer Maternal Great Grandmother     Breast Cancer Paternal Aunt     Breast Cancer Maternal Cousin         under age of 48    Breast Cancer Maternal Cousin         under age of 48    Breast Cancer Maternal Cousin         under age of 48    Breast Cancer Maternal Cousin         under age of 48       Medications   Not in a hospital admission. REVIEW OF SYSTEMS:    CONSTITUTIONAL:  negative  RESPIRATORY:  negative  CARDIOVASCULAR:  negative  GASTROINTESTINAL:  negative  ALLERGIC/IMMUNOLOGIC:  negative  NEUROLOGICAL:  negative  BEHAVIOR/PSYCH:  negative        PHYSICAL EXAM:  Vitals:    10/05/21 0915   BP: 110/80   Pulse: 98   Weight: 212 lb (96.2 kg)      Neuro:  Awake, alert, oriented to name, place and time. Thyroid: not enlarged, non tender  BREAST: no masses palpated  Abdomen:  Soft, non tender    Pelvic: WNL. Uterus not enlarged, no adnexal masses, no adnexal or uterine tenderness. No discharge or bleeding. No lesions or masses. ASSESSMENT:   52 y/o  presents for annual gyn exam    PLAN:  -PAP smear with HPV  -mammogram already done and was negative  -self breast exam encouraged  -s/p covid vaccine  -f/u annually or as needed    Electronically signed by Cristhian Dent DO on 10/5/2021 at 9:31 AM          No follow-ups on file.      Cristhian Dent DO

## 2021-10-05 NOTE — PROGRESS NOTES
[facility]    Attending Physician Statement  I have discussed the case, including pertinent history and exam findings with the resident physician. I have seen and examined the patient and the key elements of the encounter have been performed by me. I agree with the assessment, plan and orders as documented by the resident. I have reviewed all pertinent PMHx, PSHx, FamHx, SocialHx, medications, and allergies and updated history as appropriate. Patient here for routine follow up of medical problems. 1. Migraine, stable  2. Chronic sinusitis, allergies. Unfortunately exposed to chronic tobacco smoke from her neighbors. Will add flonase, continue claritin. She  3. Eczema on her hands - continue prn topical steroids, emollient. 4. Tachycardia today.  - 98 in previous visit, BP 100s/70s. No orthostatic symptoms but HR positive during orthostatic. No signs of infection. No chest pain, palpitations. Advised increased fluid intake. 5. Importance of screening discussed today given strong family history of breast and colorectal cancer in family. Mammogram reviewed with patient. She will reschedule appointment for colonoscopy and will be going to her annual gyn appointment today. 6. Check vitamin D, a1c, lipid panel    Remainder of medical problems as per resident note. Jade Yanez MD  10/5/2021 8:42 AM

## 2021-10-08 LAB
CHLAMYDIA BY THIN PREP: NEGATIVE
N. GONORRHOEAE DNA, THIN PREP: NEGATIVE
SOURCE: NORMAL

## 2021-10-14 ENCOUNTER — TELEPHONE (OUTPATIENT)
Dept: OBGYN | Age: 46
End: 2021-10-14

## 2021-10-14 DIAGNOSIS — Z12.31 VISIT FOR SCREENING MAMMOGRAM: Primary | ICD-10-CM

## 2021-10-14 NOTE — TELEPHONE ENCOUNTER
Patient called in and stated that she is due for her mammogram next month. Patient wanted to know if you could place the order.        Order placed 11/9/21

## 2021-10-14 NOTE — PROGRESS NOTES
Patient was charged for a flu vaccine administration but was not administered nor ordered this in our office. Upon reviewing her chart, this was ordered/administered at her IM appt this day in the am.  Charges were deleted.

## 2021-11-29 ENCOUNTER — TELEPHONE (OUTPATIENT)
Dept: INTERNAL MEDICINE | Age: 46
End: 2021-11-29

## 2021-11-29 NOTE — TELEPHONE ENCOUNTER
Advised patient to call her pharmacy because this refill was sent on 10/05/2021 quantity 90 with one refill

## 2021-11-29 NOTE — TELEPHONE ENCOUNTER
----- Message from Jacquie Centeno sent at 11/29/2021  1:35 PM EST -----  Subject: Refill Request    QUESTIONS  Name of Medication? amitriptyline (ELAVIL) 150 MG tablet  Patient-reported dosage and instructions? once daily at night  How many days do you have left? 7  Preferred Pharmacy? [id number] [facility] phone number (if available)? [phone]  ---------------------------------------------------------------------------  --------------  CALL BACK INFO  What is the best way for the office to contact you? OK to leave message on   voicemail  Preferred Call Back Phone Number?  [phone]

## 2021-12-07 ENCOUNTER — OFFICE VISIT (OUTPATIENT)
Dept: FAMILY MEDICINE CLINIC | Age: 46
End: 2021-12-07
Payer: MEDICAID

## 2021-12-07 VITALS
WEIGHT: 209 LBS | RESPIRATION RATE: 16 BRPM | BODY MASS INDEX: 41.03 KG/M2 | DIASTOLIC BLOOD PRESSURE: 74 MMHG | TEMPERATURE: 97.7 F | OXYGEN SATURATION: 96 % | SYSTOLIC BLOOD PRESSURE: 118 MMHG | HEIGHT: 60 IN | HEART RATE: 73 BPM

## 2021-12-07 DIAGNOSIS — J01.40 ACUTE NON-RECURRENT PANSINUSITIS: Primary | ICD-10-CM

## 2021-12-07 DIAGNOSIS — R09.81 SINUS CONGESTION: ICD-10-CM

## 2021-12-07 LAB
Lab: NORMAL
PERFORMING INSTRUMENT: NORMAL
QC PASS/FAIL: NORMAL
SARS-COV-2, POC: NORMAL

## 2021-12-07 PROCEDURE — G8417 CALC BMI ABV UP PARAM F/U: HCPCS | Performed by: NURSE PRACTITIONER

## 2021-12-07 PROCEDURE — 99213 OFFICE O/P EST LOW 20 MIN: CPT | Performed by: NURSE PRACTITIONER

## 2021-12-07 PROCEDURE — 87426 SARSCOV CORONAVIRUS AG IA: CPT | Performed by: NURSE PRACTITIONER

## 2021-12-07 PROCEDURE — G8482 FLU IMMUNIZE ORDER/ADMIN: HCPCS | Performed by: NURSE PRACTITIONER

## 2021-12-07 PROCEDURE — 1036F TOBACCO NON-USER: CPT | Performed by: NURSE PRACTITIONER

## 2021-12-07 PROCEDURE — G8427 DOCREV CUR MEDS BY ELIG CLIN: HCPCS | Performed by: NURSE PRACTITIONER

## 2021-12-07 RX ORDER — AMOXICILLIN AND CLAVULANATE POTASSIUM 875; 125 MG/1; MG/1
1 TABLET, FILM COATED ORAL 2 TIMES DAILY
Qty: 20 TABLET | Refills: 0 | Status: SHIPPED | OUTPATIENT
Start: 2021-12-07 | End: 2021-12-17

## 2021-12-07 NOTE — PROGRESS NOTES
21  Ricardo Thomas : 1975 Sex: female  Age 51 y.o. Subjective:  Chief Complaint   Patient presents with    Sinus Problem     HA and drainage x 2 weeks        HPI:   Ricardo Thomas , 51 y.o. female presents to the clinic for evaluation of sinus congestion x 2-3 weeks. The patient also reports sinus headache. The patient has taken pseudoephedrine for symptoms. The patient reports unchanged symptoms over time. The patient reports ill exposure. The patient denies hx of COVID-19 and reports having the vaccines. The patient denies acute loss of taste and smell, cough, sore throat, rash, and fever. The patient also denies chest pain, abdominal pain, shortness of breath, and nausea / vomiting / diarrhea. ROS:   Unless otherwise stated in this report the patient's positive and negative responses for review of systems for constitutional, eyes, ENT, cardiovascular, respiratory, gastrointestinal, neurological, , musculoskeletal, and integument systems and related systems to the presenting problem are either stated in the history of present illness or were not pertinent or were negative for the symptoms and/or complaints related to the presenting medical problem. Positives and pertinent negatives as per HPI. All others reviewed and are negative.       PMH:     Past Medical History:   Diagnosis Date    Degenerative joint disease of low back 2021    Migraine 20 years    Sinusitis (chronic)        Past Surgical History:   Procedure Laterality Date    ADENOIDECTOMY      BREAST BIOPSY  approx     Patient not sure which site    CHOLECYSTECTOMY  age 24   Robertha Rumps TONSILLECTOMY  age 1    WISDOM TOOTH EXTRACTION         Family History   Problem Relation Age of Onset    Cancer Mother 29        breast, bilateral, more than once; gastric cancer    Heart Disease Mother     Asthma Mother     Stroke Mother    Robertha Rumps Migraines Mother     Arthritis Mother     Colon Cancer Mother 27        rectal cancer  High Blood Pressure Father     Cancer Father         prostate    Migraines Father     Cancer Sister 29        breast and ovarian    Stroke Sister     Breast Cancer Maternal Grandmother     Cancer Paternal Grandmother         breast    Cancer Paternal Grandfather         prostate    Migraines Sister     Breast Cancer Maternal Aunt     Cancer Maternal Aunt         ovarian cancer    Colon Cancer Maternal Aunt     Breast Cancer Paternal Aunt     Prostate Cancer Paternal Uncle     Breast Cancer Maternal Cousin         under age of 48    Cancer Maternal Cousin         ovarian    Breast Cancer Maternal Aunt     Cancer Maternal Aunt         ovarian    Colon Cancer Maternal Aunt     Breast Cancer Maternal Aunt     Breast Cancer Paternal Great Grandmother     Breast Cancer Maternal Great Grandmother     Breast Cancer Paternal Aunt     Breast Cancer Maternal Cousin         under age of 48    Breast Cancer Maternal Cousin         under age of 48    Breast Cancer Maternal Cousin         under age of 48    Breast Cancer Maternal Cousin         under age of 48       Medications:     Current Outpatient Medications:     amoxicillin-clavulanate (AUGMENTIN) 875-125 MG per tablet, Take 1 tablet by mouth 2 times daily for 10 days, Disp: 20 tablet, Rfl: 0    amitriptyline (ELAVIL) 150 MG tablet, TAKE ONE TABLET BY MOUTH NIGHTLY, Disp: 90 tablet, Rfl: 1    loratadine-pseudoephedrine (CLARITIN-D 12 HOUR) 5-120 MG per extended release tablet, Take 1 tablet by mouth 2 times daily as needed (sinus pressure), Disp: 60 tablet, Rfl: 3    SUMAtriptan (IMITREX) 100 MG tablet, Take one tablet  prn headache. May repeat in 2 hours. No more than 200 mg in 24 hours. Do not treat greater than 4 headaches in 30 days. , Disp: 30 tablet, Rfl: 1    mineral oil-hydrophilic petrolatum (AQUAPHOR) ointment, APPLY TOPICALLY AS NEEDED, Disp: 52.5 g, Rfl: 3    dextran 70-hypromellose (ARTIFICIAL TEARS) 0.1-0.3 % SOLN opthalmic Physical Exam  Constitutional:       Appearance: Normal appearance. HENT:      Head: Normocephalic. Right Ear: Tympanic membrane, ear canal and external ear normal.      Left Ear: Tympanic membrane, ear canal and external ear normal.      Nose: Congestion and rhinorrhea present. Right Turbinates: Swollen. Left Turbinates: Swollen. Right Sinus: Maxillary sinus tenderness and frontal sinus tenderness present. Left Sinus: Maxillary sinus tenderness and frontal sinus tenderness present. Mouth/Throat:      Mouth: Mucous membranes are moist.      Pharynx: Oropharynx is clear. Eyes:      Pupils: Pupils are equal, round, and reactive to light. Cardiovascular:      Rate and Rhythm: Normal rate and regular rhythm. Pulses: Normal pulses. Heart sounds: Normal heart sounds. Pulmonary:      Effort: Pulmonary effort is normal.      Breath sounds: Normal breath sounds. Abdominal:      General: Bowel sounds are normal.      Palpations: Abdomen is soft. Musculoskeletal:         General: Normal range of motion. Cervical back: Normal range of motion and neck supple. Skin:     General: Skin is warm and dry. Capillary Refill: Capillary refill takes less than 2 seconds. Neurological:      General: No focal deficit present. Mental Status: She is alert and oriented to person, place, and time. Psychiatric:         Mood and Affect: Mood normal.         Behavior: Behavior normal.          Testing:   (All laboratory and radiology results have been personally reviewed by myself)  Labs:  Results for orders placed or performed in visit on 12/07/21   POCT COVID-19, Antigen   Result Value Ref Range    SARS-COV-2, POC Not-Detected Not Detected    Lot Number [id number]     QC Pass/Fail pass     Performing Instrument BD Veritor        Imaging: All Radiology results interpreted by Radiologist unless otherwise noted.   No orders to display       Assessment / Plan:   The patient's vitals, allergies, medications, and past medical history have been reviewed. Ericka Smith was seen today for sinus problem. Diagnoses and all orders for this visit:    Acute non-recurrent pansinusitis  -     amoxicillin-clavulanate (AUGMENTIN) 875-125 MG per tablet; Take 1 tablet by mouth 2 times daily for 10 days    Sinus congestion  -     POCT COVID-19, Antigen        - Disposition: Home    - Educational material printed for patient's review and were included in patient instructions. After Visit Summary and given to patient at the end of visit. -  Encouraged oral fluids and rest. Discussed symptomatic treatments with patient today including Zyrtec-D prn rhinitis and Tylenol prn for fever / pain. Schedule a follow-up with PCP in 2-3 days. Red flag symptoms were discussed with the patient today. The patient is directed to go the ED if symptoms change or worsen. Pt verbalizes understanding and is in agreement with plan of care. All questions answered. SIGNATURE: NANCY Collins-CNP    *NOTE: This report was transcribed using voice recognition software. Every effort was made to ensure accuracy; however, inadvertent computerized transcription errors may be present.

## 2021-12-07 NOTE — PATIENT INSTRUCTIONS
Patient Education        Sinusitis: Care Instructions  Your Care Instructions     Sinusitis is an infection of the lining of the sinus cavities in your head. Sinusitis often follows a cold. It causes pain and pressure in your head and face. In most cases, sinusitis gets better on its own in 1 to 2 weeks. But some mild symptoms may last for several weeks. Sometimes antibiotics are needed. Follow-up care is a key part of your treatment and safety. Be sure to make and go to all appointments, and call your doctor if you are having problems. It's also a good idea to know your test results and keep a list of the medicines you take. How can you care for yourself at home? · Take an over-the-counter pain medicine, such as acetaminophen (Tylenol), ibuprofen (Advil, Motrin), or naproxen (Aleve). Read and follow all instructions on the label. · If the doctor prescribed antibiotics, take them as directed. Do not stop taking them just because you feel better. You need to take the full course of antibiotics. · Be careful when taking over-the-counter cold or flu medicines and Tylenol at the same time. Many of these medicines have acetaminophen, which is Tylenol. Read the labels to make sure that you are not taking more than the recommended dose. Too much acetaminophen (Tylenol) can be harmful. · Breathe warm, moist air from a steamy shower, a hot bath, or a sink filled with hot water. Avoid cold, dry air. Using a humidifier in your home may help. Follow the directions for cleaning the machine. · Use saline (saltwater) nasal washes. This can help keep your nasal passages open and wash out mucus and bacteria. You can buy saline nose drops at a grocery store or drugstore. Or you can make your own at home by adding 1 teaspoon of salt and 1 teaspoon of baking soda to 2 cups of distilled water. If you make your own, fill a bulb syringe with the solution, insert the tip into your nostril, and squeeze gently.  Yolande villanueva nose.  · Put a hot, wet towel or a warm gel pack on your face 3 or 4 times a day for 5 to 10 minutes each time. · Try a decongestant nasal spray like oxymetazoline (Afrin). Do not use it for more than 3 days in a row. Using it for more than 3 days can make your congestion worse. When should you call for help? Call your doctor now or seek immediate medical care if:    · You have new or worse swelling or redness in your face or around your eyes.     · You have a new or higher fever. Watch closely for changes in your health, and be sure to contact your doctor if:    · You have new or worse facial pain.     · The mucus from your nose becomes thicker (like pus) or has new blood in it.     · You are not getting better as expected. Where can you learn more? Go to https://[employer]. org and sign in to your [employer] account. Enter O987 in the Prixtel box to learn more about \"Sinusitis: Care Instructions. \"     If you do not have an account, please click on the \"Sign Up Now\" link. Current as of: December 2, 2020               Content Version: 13.0  © 2184-5263 [employer]. Care instructions adapted under license by [facility] [place] ([facility]). If you have questions about a medical condition or this instruction, always ask your healthcare professional. [place] [address] any warranty or liability for your use of this information.

## 2022-01-04 ENCOUNTER — TELEPHONE (OUTPATIENT)
Dept: SURGERY | Age: 47
End: 2022-01-04

## 2022-01-04 ENCOUNTER — OFFICE VISIT (OUTPATIENT)
Dept: FAMILY MEDICINE CLINIC | Age: 47
End: 2022-01-04
Payer: MEDICAID

## 2022-01-04 VITALS
WEIGHT: 207 LBS | BODY MASS INDEX: 40.64 KG/M2 | OXYGEN SATURATION: 98 % | HEIGHT: 60 IN | DIASTOLIC BLOOD PRESSURE: 70 MMHG | TEMPERATURE: 97.6 F | HEART RATE: 93 BPM | RESPIRATION RATE: 16 BRPM | SYSTOLIC BLOOD PRESSURE: 116 MMHG

## 2022-01-04 DIAGNOSIS — Z20.822 CLOSE EXPOSURE TO COVID-19 VIRUS: Primary | ICD-10-CM

## 2022-01-04 LAB
Lab: NORMAL
PERFORMING INSTRUMENT: NORMAL
QC PASS/FAIL: NORMAL
SARS-COV-2, POC: NORMAL

## 2022-01-04 PROCEDURE — 87426 SARSCOV CORONAVIRUS AG IA: CPT | Performed by: NURSE PRACTITIONER

## 2022-01-04 PROCEDURE — 99213 OFFICE O/P EST LOW 20 MIN: CPT | Performed by: NURSE PRACTITIONER

## 2022-01-04 PROCEDURE — G8417 CALC BMI ABV UP PARAM F/U: HCPCS | Performed by: NURSE PRACTITIONER

## 2022-01-04 PROCEDURE — G8482 FLU IMMUNIZE ORDER/ADMIN: HCPCS | Performed by: NURSE PRACTITIONER

## 2022-01-04 PROCEDURE — G8427 DOCREV CUR MEDS BY ELIG CLIN: HCPCS | Performed by: NURSE PRACTITIONER

## 2022-01-04 PROCEDURE — 1036F TOBACCO NON-USER: CPT | Performed by: NURSE PRACTITIONER

## 2022-01-04 NOTE — PROGRESS NOTES
1/4/22  Desiree Holiday : 1975 Sex: female  Age 51 y.o. Subjective:  Chief Complaint   Patient presents with    Concern For COVID-19     exposed to Covid by neighbor, pt is vaccinated       HPI:   Desiree Anderson , 51 y.o. female presents to the clinic for evaluation of COVID-19 exposure 4 days ago. The patient denies any symptoms. The patient denies hx of COVID-19 and reports having the vaccines and booster. The patient denies acute loss of taste and smell, headache, sinus congestion, cough, sore throat, rash, and fever. The patient also denies chest pain, abdominal pain, shortness of breath, and nausea / vomiting / diarrhea. ROS:   Unless otherwise stated in this report the patient's positive and negative responses for review of systems for constitutional, eyes, ENT, cardiovascular, respiratory, gastrointestinal, neurological, , musculoskeletal, and integument systems and related systems to the presenting problem are either stated in the history of present illness or were not pertinent or were negative for the symptoms and/or complaints related to the presenting medical problem. Positives and pertinent negatives as per HPI. All others reviewed and are negative.       PMH:     Past Medical History:   Diagnosis Date    Degenerative joint disease of low back 2021    Migraine 20 years    Sinusitis (chronic)        Past Surgical History:   Procedure Laterality Date    ADENOIDECTOMY      BREAST BIOPSY  approx     Patient not sure which site    CHOLECYSTECTOMY  age 24   [facility] TONSILLECTOMY  age 1    WISDOM TOOTH EXTRACTION         Family History   Problem Relation Age of Onset    Cancer Mother 29        breast, bilateral, more than once; gastric cancer    Heart Disease Mother     Asthma Mother     Stroke Mother    [facility] Migraines Mother     Arthritis Mother     Colon Cancer Mother 27        rectal cancer    High Blood Pressure Father     Cancer Father         prostate    Migraines Father     Cancer Sister 29        breast and ovarian    Stroke Sister     Breast Cancer Maternal Grandmother     Cancer Paternal Grandmother         breast    Cancer Paternal Grandfather         prostate    Migraines Sister     Breast Cancer Maternal Aunt     Cancer Maternal Aunt         ovarian cancer    Colon Cancer Maternal Aunt     Breast Cancer Paternal Aunt     Prostate Cancer Paternal Uncle     Breast Cancer Maternal Cousin         under age of 48    Cancer Maternal Cousin         ovarian    Breast Cancer Maternal Aunt     Cancer Maternal Aunt         ovarian    Colon Cancer Maternal Aunt     Breast Cancer Maternal Aunt     Breast Cancer Paternal Great Grandmother     Breast Cancer Maternal Great Grandmother     Breast Cancer Paternal Aunt     Breast Cancer Maternal Cousin         under age of 48    Breast Cancer Maternal Cousin         under age of 48    Breast Cancer Maternal Cousin         under age of 48    Breast Cancer Maternal Cousin         under age of 48       Medications:     Current Outpatient Medications:     amitriptyline (ELAVIL) 150 MG tablet, TAKE ONE TABLET BY MOUTH NIGHTLY, Disp: 90 tablet, Rfl: 1    loratadine-pseudoephedrine (CLARITIN-D 12 HOUR) 5-120 MG per extended release tablet, Take 1 tablet by mouth 2 times daily as needed (sinus pressure), Disp: 60 tablet, Rfl: 3    SUMAtriptan (IMITREX) 100 MG tablet, Take one tablet  prn headache. May repeat in 2 hours. No more than 200 mg in 24 hours. Do not treat greater than 4 headaches in 30 days. , Disp: 30 tablet, Rfl: 1    mineral oil-hydrophilic petrolatum (AQUAPHOR) ointment, APPLY TOPICALLY AS NEEDED, Disp: 52.5 g, Rfl: 3    dextran 70-hypromellose (ARTIFICIAL TEARS) 0.1-0.3 % SOLN opthalmic solution, Place 1 drop into both eyes 2 times daily, Disp: 1 each, Rfl: 1    NONFORMULARY, Take by mouth daily Gummie for hair skin and nails, Disp: , Rfl:     ibuprofen (IBU) 400 MG tablet, Take 1 tablet by mouth every 8 hours as needed for Pain Take with full glass of water, Disp: 12 tablet, Rfl: 0    acetaminophen (TYLENOL) 500 MG tablet, Take 2 tablets by mouth every 8 hours as needed for Pain, Disp: 50 tablet, Rfl: 0    Ascorbic Acid (VITAMIN C) 250 MG tablet, Take 500 mg by mouth daily Gummie, Disp: , Rfl:     Cholecalciferol (VITAMIN D3) 50 MCG (2000 UT) CAPS, Take 1 capsule by mouth daily Gummie, Disp: , Rfl:     MULTIPLE VITAMINS PO, Take 1 tablet by mouth daily Gummie, Disp: , Rfl:     melatonin 3 MG TABS tablet, Take 3 mg by mouth nightly as needed , Disp: , Rfl:     fluticasone (FLONASE) 50 MCG/ACT nasal spray, 1 spray by Each Nostril route daily (Patient not taking: Reported on 12/7/2021), Disp: 16 g, Rfl: 2    Allergies: Allergies   Allergen Reactions    Latex Anaphylaxis, Hives and Rash    Iodine Hives    Trazodone And Nefazodone Nausea And Vomiting    Bactrim Hives    Iodides Hives    Aspirin Hives and Nausea And Vomiting    Tylenol With Codeine [Acetaminophen-Codeine] Nausea And Vomiting    Ultram [Tramadol Hcl] Nausea And Vomiting    Vicodin Tuss [Hydrocodone-Guaifenesin] Nausea And Vomiting       Social History:     Social History     Tobacco Use    Smoking status: Passive Smoke Exposure - Never Smoker    Smokeless tobacco: Never Used   Vaping Use    Vaping Use: Never used   Substance Use Topics    Alcohol use: Yes     Alcohol/week: 0.0 standard drinks     Comment: rarely     Drug use: No       Patient lives at home. Physical Exam:     Vitals:    01/04/22 0958   BP: 116/70   Pulse: 93   Resp: 16   Temp: 97.6 °F (36.4 °C)   TempSrc: Infrared   SpO2: 98%   Weight: 207 lb (93.9 kg)   Height: 5' (1.524 m)       Physical Exam (PE)    Physical Exam  Constitutional:       Appearance: Normal appearance. HENT:      Head: Normocephalic.       Right Ear: Tympanic membrane, ear canal and external ear normal.      Left Ear: Tympanic membrane, ear canal and external ear normal. Nose: Nose normal.      Mouth/Throat:      Mouth: Mucous membranes are moist.      Pharynx: Oropharynx is clear. Eyes:      Pupils: Pupils are equal, round, and reactive to light. Cardiovascular:      Rate and Rhythm: Normal rate and regular rhythm. Pulses: Normal pulses. Heart sounds: Normal heart sounds. Pulmonary:      Effort: Pulmonary effort is normal.      Breath sounds: Normal breath sounds. No wheezing, rhonchi or rales. Abdominal:      General: Bowel sounds are normal.      Palpations: Abdomen is soft. Musculoskeletal:         General: Normal range of motion. Cervical back: Normal range of motion and neck supple. Lymphadenopathy:      Cervical: No cervical adenopathy. Skin:     General: Skin is warm and dry. Capillary Refill: Capillary refill takes less than 2 seconds. Neurological:      General: No focal deficit present. Mental Status: She is alert and oriented to person, place, and time. Psychiatric:         Mood and Affect: Mood normal.         Behavior: Behavior normal.          Testing:   (All laboratory and radiology results have been personally reviewed by myself)  Labs:  Results for orders placed or performed in visit on 01/04/22   POCT COVID-19, Antigen   Result Value Ref Range    SARS-COV-2, POC Not-Detected Not Detected    Lot Number [id number]     QC Pass/Fail PAS     Performing Instrument BD Veritor        Imaging: All Radiology results interpreted by Radiologist unless otherwise noted. No orders to display       Assessment / Plan:   The patient's vitals, allergies, medications, and past medical history have been reviewed. Ralph Rush was seen today for concern for covid-19. Diagnoses and all orders for this visit:    Close exposure to COVID-19 virus  -     POCT COVID-19, Antigen        - Disposition: Home    - Educational material printed for patient's review and were included in patient instructions.  After Visit Summary and given to patient at the end of visit. - Advised to follow CDC guidelines. Encouraged oral fluids and rest. Discussed symptomatic treatments with patient today including Tylenol prn for fever / pain. Schedule a follow-up with PCP in 2-3 days. Red flag symptoms were discussed with the patient today. The patient is directed to go the ED if symptoms change or worsen. Pt verbalizes understanding and is in agreement with plan of care. All questions answered. SIGNATURE: NANCY Valiente-CNP    *NOTE: This report was transcribed using voice recognition software. Every effort was made to ensure accuracy; however, inadvertent computerized transcription errors may be present.

## 2022-01-04 NOTE — TELEPHONE ENCOUNTER
----- Message from Ivy Wilder MA sent at 10/6/2021  2:58 PM EDT -----  -Patient needs scheduled for appt to discuss colonoscopy, last seen 3/2021 never went through with colonoscopy. Per patient request to be called and get scheduled for Jan 2022.       Electronically signed by Ivy Wilder MA on 10/6/21 at 2:58 PM EDT

## 2022-01-04 NOTE — PATIENT INSTRUCTIONS
Patient Education        Learning About Coronavirus [phone])  What is coronavirus (COVID-19)? COVID-19 is a disease caused by a type of coronavirus. This illness was first found in December 2019. It has since spread worldwide. Coronaviruses are a large group of viruses. They cause the common cold. They also cause more serious illnesses like [place] respiratory syndrome (MERS) and severe acute respiratory syndrome (SARS). COVID-19 is caused by a novel coronavirus. That means it's a new type that has not been seen in people before. What are the symptoms? COVID-19 symptoms may include:  · Fever. · Cough. · Trouble breathing. · Chills or repeated shaking with chills. · Muscle and body aches. · Headache. · Sore throat. · New loss of taste or smell. · Vomiting. · Diarrhea. In severe cases, COVID-19 can cause pneumonia and make it hard to breathe without help from a machine. It can cause death. How is it diagnosed? COVID-19 is diagnosed with a viral test. This may also be called a PCR test or antigen test. It looks for evidence of the virus in your breathing passages or lungs (respiratory system). The test is most often done on a sample from the nose, throat, or lungs. It's sometimes done on a sample of saliva. One way a sample is collected is by putting a long swab into the back of your nose. How is it treated? Mild cases of COVID-19 can be treated at home. Serious cases need treatment in the hospital. Treatment may include medicines to reduce symptoms, plus breathing support such as oxygen therapy or a ventilator. Some people may be placed on their belly to help their oxygen levels. Treatments that may help people who have COVID-19 include:  Antiviral medicines. These medicines treat viral infections. Remdesivir is an example. Immune-based therapy. These medicines help the immune system fight COVID-19. Examples include monoclonal antibodies. Blood thinners.   These medicines help prevent blood clots. People with severe illness are at risk for blood clots. How can you protect yourself and others? · Get vaccinated. · Avoid sick people. · Cover your mouth with a tissue when you cough or sneeze. · Wash your hands often, especially after you cough or sneeze. Use soap and water, and scrub for at least 20 seconds. If soap and water aren't available, use an alcohol-based hand . · Avoid touching your mouth, nose, and eyes. Be sure to follow all instructions from the [facility] and your local health authorities. Here are some examples of specific precautions you may need to take. · If you are not fully vaccinated:  ? Wear a mask if you have to go to public areas. ? Avoid crowds and try to stay at least 6 feet away from other people. · If you have been exposed to the virus and are not fully vaccinated:  ? Stay home. Don't go to school, work, or public areas. And don't use public transportation, ride-shares, or taxis unless you have no choice. ? Wear a mask if you have to go to public areas, like the pharmacy. · Even if you're fully vaccinated, there's still a small chance you can get and spread COVID-19. If you live in an area where COVID-19 is spreading quickly, wear a mask if you have to go to indoor public areas. You might also want to wear a mask in crowded outdoor areas if you:  ? Have certain health conditions. ? Live with someone who has a compromised immune system. ? Live with someone who is not fully vaccinated. · If you have been exposed and you are fully vaccinated:  ? Talk to your doctor. You may need a COVID-19 test.  ? Wear a mask in public indoor spaces for 14 days or until you test negative for COVID-19. If you're sick:  · Leave your home only if you need to get medical care. But call the doctor's office first so they know you're coming. And wear a mask. · Wear a mask whenever you're around other people. · Limit contact with pets and people in your home.  If possible, stay in a separate bedroom and use a separate bathroom. · Clean and disinfect your home every day. Use household  and disinfectant wipes or sprays. Take special care to clean things that you touch with your hands. How can you self-isolate when you have COVID-19? If you have COVID-19, there are things you can do to help avoid spreading the virus to others. · Limit contact with people in your home. If possible, stay in a separate bedroom and use a separate bathroom. · Wear a mask when you are around other people. · If you have to leave home, avoid crowds and try to stay at least 6 feet away from other people. · Avoid contact with pets and other animals. · Cover your mouth and nose with a tissue when you cough or sneeze. Then throw it in the trash right away. · Wash your hands often, especially after you cough or sneeze. Use soap and water, and scrub for at least 20 seconds. If soap and water aren't available, use an alcohol-based hand . · Don't share personal household items. These include bedding, towels, cups and glasses, and eating utensils. · [address] in the warmest water allowed for the fabric type, and dry it completely. It's okay to wash other people's laundry with yours. · Clean and disinfect your home. Use household  and disinfectant wipes or sprays. When should you call for help? Call 911 anytime you think you may need emergency care. For example, call if you have life-threatening symptoms, such as:    · You have severe trouble breathing. (You can't talk at all.)     · You have constant chest pain or pressure.     · You are severely dizzy or lightheaded.     · You are confused or can't think clearly.     · You have pale, gray, or blue-colored skin or lips.     · You pass out (lose consciousness) or are very hard to wake up. Call your doctor now or seek immediate medical care if:    · You have moderate trouble breathing.  (You can't speak a full sentence.)     · You are coughing up blood (more than about 1 teaspoon).     · You have signs of low blood pressure. These include feeling lightheaded; being too weak to stand; and having cold, pale, clammy skin. Watch closely for changes in your health, and be sure to contact your doctor if:    · Your symptoms get worse.     · You are not getting better as expected.     · You have new or worse symptoms of anxiety, depression, nightmares, or flashbacks. Call before you go to the doctor's office. Follow their instructions. And wear a mask. Current as of: July 1, 2021               Content Version: 13.1  © 2006-2021 [employer]. Care instructions adapted under license by [facility] ([facility]). If you have questions about a medical condition or this instruction, always ask your healthcare professional. [address] any warranty or liability for your use of this information.

## 2022-01-04 NOTE — TELEPHONE ENCOUNTER
MA attempt to contact patient to schedule an office visit to discuss colonoscopy. MA reached patient VM and left detailed message of why was calling and office number for patient to call office back and set up appt.         Electronically signed by Meredith Grider MA on 1/4/22 at 8:19 AM EST

## 2022-01-11 ENCOUNTER — TELEPHONE (OUTPATIENT)
Dept: INTERNAL MEDICINE | Age: 47
End: 2022-01-11

## 2022-01-11 NOTE — TELEPHONE ENCOUNTER
Spoke with patient, appointment rescheduled for 1/18/22 at 3pm. Patient states may be here a few minutes late, leaving work at 1 ,but will be here by 315pm. Appointment grid marked , patient will be here by 315pm.

## 2022-01-11 NOTE — TELEPHONE ENCOUNTER
----- Message from KaseyBrandin Hernandez Dr sent at 1/11/2022 10:04 AM EST -----  Subject: Appointment Request    Reason for Call: Routine Existing Condition Follow Up    QUESTIONS  Type of Appointment? Established Patient  Reason for appointment request? Available appointments did not meet   patient need  Additional Information for Provider? Patient calling in to rescheduled   appt for next week, she is starting a new job and gets off at 3pm now, so   will need an appt at 3:15 or later. Please call to schedule. Also she will   need a refill on her meds prior to appt if she can not get in this month.   ---------------------------------------------------------------------------  --------------  CALL BACK INFO  What is the best way for the office to contact you? OK to leave message on   voicemail  Preferred Call Back Phone Number? [phone]  ---------------------------------------------------------------------------  --------------  SCRIPT ANSWERS  Relationship to Patient? Self  Have your symptoms changed? No  Is this follow up request related to routine Diabetes Management? No  Have you been diagnosed with, awaiting test results for, or told that you   are suspected of having COVID-19 (Coronavirus)? (If patient has tested   negative or was tested as a requirement for work, school, or travel and   not based on symptoms, answer no)? No  Within the past two weeks have you developed any of the following symptoms   (answer no if symptoms have been present longer than 2 weeks or began   more than 2 weeks ago)? Fever or Chills, Cough, Shortness of breath or   difficulty breathing, Loss of taste or smell, Sore throat, Nasal   congestion, Sneezing or runny nose, Fatigue or generalized body aches   (answer no if pain is specific to a body part e.g. back pain), Diarrhea,   Headache? No  Have you had close contact with someone with COVID-19 in the last 14 days?    No  (Service Expert  click yes below to proceed with [employer] As Usual   Scheduling)?  Yes

## 2022-01-19 ENCOUNTER — TELEPHONE (OUTPATIENT)
Dept: INTERNAL MEDICINE | Age: 47
End: 2022-01-19

## 2022-01-20 ENCOUNTER — TELEPHONE (OUTPATIENT)
Dept: SURGERY | Age: 47
End: 2022-01-20

## 2022-01-20 NOTE — TELEPHONE ENCOUNTER
CANDICE Saucedo received a call from patient wanting to schedule an appointment. MA scheduled pt for 2/7/2022 @ 3:15pm with  in [facility]. Patient verbalized appointment date, time and location. MA verified number that was good to do reminder call on was the one listed in chart. MA advised patient where to park and enter in the building for the appointment.       Electronically signed by Daxa Carolina MA on 1/20/22 at 10:31 AM EST

## 2022-01-28 DIAGNOSIS — G43.109 MIGRAINE WITH AURA AND WITHOUT STATUS MIGRAINOSUS, NOT INTRACTABLE: Chronic | ICD-10-CM

## 2022-01-28 RX ORDER — AMITRIPTYLINE HYDROCHLORIDE 150 MG/1
TABLET, FILM COATED ORAL
Qty: 90 TABLET | Refills: 0 | Status: SHIPPED
Start: 2022-01-28 | End: 2022-04-12 | Stop reason: SDUPTHER

## 2022-02-07 ENCOUNTER — OFFICE VISIT (OUTPATIENT)
Dept: SURGERY | Age: 47
End: 2022-02-07
Payer: MEDICAID

## 2022-02-07 VITALS
OXYGEN SATURATION: 97 % | DIASTOLIC BLOOD PRESSURE: 88 MMHG | RESPIRATION RATE: 16 BRPM | HEIGHT: 61 IN | BODY MASS INDEX: 39.08 KG/M2 | HEART RATE: 91 BPM | TEMPERATURE: 98.2 F | SYSTOLIC BLOOD PRESSURE: 134 MMHG | WEIGHT: 207 LBS

## 2022-02-07 DIAGNOSIS — Z80.0 FAMILY HISTORY OF COLON CANCER: ICD-10-CM

## 2022-02-07 PROCEDURE — G8417 CALC BMI ABV UP PARAM F/U: HCPCS | Performed by: SURGERY

## 2022-02-07 PROCEDURE — 1036F TOBACCO NON-USER: CPT | Performed by: SURGERY

## 2022-02-07 PROCEDURE — G8482 FLU IMMUNIZE ORDER/ADMIN: HCPCS | Performed by: SURGERY

## 2022-02-07 PROCEDURE — 99213 OFFICE O/P EST LOW 20 MIN: CPT | Performed by: SURGERY

## 2022-02-07 PROCEDURE — G8427 DOCREV CUR MEDS BY ELIG CLIN: HCPCS | Performed by: SURGERY

## 2022-02-07 ASSESSMENT — ENCOUNTER SYMPTOMS
VOMITING: 0
SHORTNESS OF BREATH: 0
ABDOMINAL DISTENTION: 0
DIARRHEA: 0
BLOOD IN STOOL: 0
EYES NEGATIVE: 1
ANAL BLEEDING: 0
COUGH: 0
ABDOMINAL PAIN: 0
RESPIRATORY NEGATIVE: 1
CONSTIPATION: 0
BACK PAIN: 0
ALLERGIC/IMMUNOLOGIC NEGATIVE: 1
NAUSEA: 0
GASTROINTESTINAL NEGATIVE: 1

## 2022-02-07 NOTE — PATIENT INSTRUCTIONS
[facility]    Instructions for Clear liquid diet  Definition  A clear liquid diet consists of clear liquids, such as water, broth and plain gelatin, that are easily digested and leave no undigested residue in your intestinal tract. Your doctor may prescribe a clear liquid diet before certain medical procedures or if you have certain digestive problems. Because a clear liquid diet can't provide you with adequate calories and nutrients, it shouldn't be continued for more than a few days. Purpose  A clear liquid diet is often used before tests, procedures or surgeries that require no food in your stomach or intestines, such as before colonoscopy. It may also be recommended as a short-term diet if you have certain digestive problems, such as nausea, vomiting or diarrhea, or after certain types of surgery. Diet details  A clear liquid diet helps maintain adequate hydration, provides some important electrolytes, such as sodium and potassium, and gives some energy at a time when a full diet isn't possible or recommended. The following foods are allowed in a clear liquid diet:    Plain water    Fruit juices without pulp, such as apple juice, white grape juice.  Strained lemonade    Clear, fat-free broth (bouillon or consomme)    Clear sodas     Plain gelatin (Not RED or PURPLE)   Honey    Ice pops without bits of fruit or fruit pulp    Tea or coffee without milk or cream   ** NOTHING RED OR PURPLE   **Do not eat corn, tomatoes, peas or watermelon 3 to 5 days before procedure. Any foods not on the above list should be avoided. Also, for certain tests, such as colon exams, your doctor may ask you to avoid liquids or gelatin with red coloring.      A typical menu on the clear liquid diet may look like this:   Breakfast:  1 glass fruit juice  1 cup coffee or tea (without dairy products)  1 cup broth  1 bowl gelatin     Snack:  1 glass fruit juice  1 bowl gelatin     Lunch:  1 glass fruit juice  1 glass water  1 cup broth  1 bowl gelatin     Snack:  1 ice pop (without fruit pulp)  1 cup coffee or tea (without dairy products) or a soft drink     Dinner:  1 cup juice or water  1 cup broth  1 bowl gelatin  1 cup coffee or tea     Purchase this over the counter:  1. GATORADE/Clear liquid (64 ounces)   Pick these up at the pharmacy:  2. DULCOLAX 5 mg tablets (four tablets)  3. MIRALAX BOTTLE 500 grams     The DAY BEFORE your colonoscopy:   Drink only clear liquids. (Absolutely no solid food)    COLONOSCOPY PREP:  12 PM: Take 2 DULCOLAX tablets and mix the entire bottle/bottles of MIRALAX into the 64 ounces of GATORADE. (Put half the bottle in each 32 ounce bottle if have one large bottle). Shake the solution until fully dissolved. Drink an 8 ounce glass every 30 minutes until the solution is gone. 4 PM: Take the last 2 DULCOLAX tablets. **DO NOT EAT OR DRINK ANYTHING AFTER MIDNIGHT**          The DAY OF your colonoscopy: You may take any necessary medications with a sip of water. Bring along someone to take you home. REMEMBER: The preparation is very important. An adequate clean out allows for the best evaluation of your entire colon. During the prep, using baby wipes may ease some of your discomfort. You should NOT plan on working or driving the rest of the day due to sedation given at the procedure. Any questions or concerns, please contact my medical assistant Raina at [phone].

## 2022-02-07 NOTE — PROGRESS NOTES
[facility]  HISTORY & PHYSICAL  ATTENDING NOTE    Chief Complaint   Patient presents with    Colonoscopy     pt is here to discuss colonoscopy, pt was seen last year but did not schedule, she states that she is ready now. pt's mom had colon cancer. she denies any current issues such as abdominal pain, rectal bleeding, changes in bowel habits or unintentional weight loss. HPI:  51 y.o. female denies weight loss, diarrhea, constipation, melena, hematochezia, N/V, abdominal pain. she denies family history of  Crohn's disease or colitis. Eren Rodríguez has an extensive family history of colon cancer, breast cancer. I reviewed her family history as it is in the chart right now and it is accurate. She has met with the genetics counselor and tested for a variant of unknown significance on the MLH1 gene. She continues to follow-up in the breast care center for her high risk status. Her last mammogram was in 2020. Her last MRI was in 2021. She is due for mammogram and she is going to be having it soon. It is ordered.     Past Medical History:   Diagnosis Date    Degenerative joint disease of low back 2/11/2021    Migraine 20 years    Sinusitis (chronic)        Past Surgical History:   Procedure Laterality Date    ADENOIDECTOMY      BREAST BIOPSY  approx 2020    Patient not sure which site    CHOLECYSTECTOMY  age 24   Rupa Fickle TONSILLECTOMY  age 1    WISDOM TOOTH EXTRACTION         Family History   Problem Relation Age of Onset    Cancer Mother 29        breast, bilateral, more than once; uterine cancer    Heart Disease Mother     Asthma Mother     Stroke Mother     Migraines Mother     Arthritis Mother     Colon Cancer Mother 27        rectal cancer    High Blood Pressure Father     Cancer Father         prostate    Migraines Father     Cancer Sister 29        breast and ovarian    Stroke Sister     Breast Cancer Maternal Grandmother     Cancer Paternal Grandmother breast    Cancer Paternal Grandfather         prostate    Migraines Sister     Breast Cancer Maternal Aunt     Cancer Maternal Aunt         ovarian cancer    Colon Cancer Maternal Aunt     Breast Cancer Paternal Aunt     Prostate Cancer Paternal Uncle     Breast Cancer Maternal Cousin         under age of 48    Cancer Maternal Cousin         ovarian    Breast Cancer Maternal Aunt     Cancer Maternal Aunt         ovarian    Colon Cancer Maternal Aunt     Breast Cancer Maternal Aunt     Breast Cancer Paternal Great Grandmother     Breast Cancer Maternal Great Grandmother     Breast Cancer Paternal Aunt     Breast Cancer Maternal Cousin         under age of 48    Breast Cancer Maternal Cousin         under age of 48    Breast Cancer Maternal Cousin         under age of 48    Breast Cancer Maternal Cousin         under age of 48       Allergies   Allergen Reactions    Latex Anaphylaxis, Hives and Rash    Iodine Hives    Trazodone And Nefazodone Nausea And Vomiting    Bactrim Hives    Iodides Hives    Aspirin Hives and Nausea And Vomiting    Tylenol With Codeine [Acetaminophen-Codeine] Nausea And Vomiting    Ultram [Tramadol Hcl] Nausea And Vomiting    Vicodin Tuss [Hydrocodone-Guaifenesin] Nausea And Vomiting       Social History     Tobacco Use    Smoking status: Passive Smoke Exposure - Never Smoker    Smokeless tobacco: Never Used   Vaping Use    Vaping Use: Never used   Substance Use Topics    Alcohol use: Yes     Alcohol/week: 0.0 standard drinks     Comment: rarely     Drug use: No       Current Outpatient Medications   Medication Sig Dispense Refill    amitriptyline (ELAVIL) 150 MG tablet TAKE ONE TABLET BY MOUTH NIGHTLY 90 tablet 0    loratadine-pseudoephedrine (CLARITIN-D 12 HOUR) 5-120 MG per extended release tablet Take 1 tablet by mouth 2 times daily as needed (sinus pressure) 60 tablet 3    SUMAtriptan (IMITREX) 100 MG tablet Take one tablet  prn headache.  May repeat in 2 hours. No more than 200 mg in 24 hours. Do not treat greater than 4 headaches in 30 days. 30 tablet 1    mineral oil-hydrophilic petrolatum (AQUAPHOR) ointment APPLY TOPICALLY AS NEEDED 52.5 g 3    fluticasone (FLONASE) 50 MCG/ACT nasal spray 1 spray by Each Nostril route daily 16 g 2    dextran 70-hypromellose (ARTIFICIAL TEARS) 0.1-0.3 % SOLN opthalmic solution Place 1 drop into both eyes 2 times daily 1 each 1    NONFORMULARY Take by mouth daily Gummie for hair skin and nails      ibuprofen (IBU) 400 MG tablet Take 1 tablet by mouth every 8 hours as needed for Pain Take with full glass of water 12 tablet 0    acetaminophen (TYLENOL) 500 MG tablet Take 2 tablets by mouth every 8 hours as needed for Pain 50 tablet 0    Ascorbic Acid (VITAMIN C) 250 MG tablet Take 500 mg by mouth daily Gummie      Cholecalciferol (VITAMIN D3) 50 MCG (2000 UT) CAPS Take 1 capsule by mouth daily Gummie      MULTIPLE VITAMINS PO Take 1 tablet by mouth daily Gummie      melatonin 3 MG TABS tablet Take 3 mg by mouth nightly as needed        No current facility-administered medications for this visit. Review of Systems   Constitutional: Negative. Negative for activity change, appetite change and unexpected weight change. HENT: Negative. Eyes: Negative. Respiratory: Negative. Negative for cough and shortness of breath. Cardiovascular: Negative. Negative for chest pain and leg swelling. Gastrointestinal: Negative. Negative for abdominal distention, abdominal pain, anal bleeding, blood in stool, constipation, diarrhea, nausea and vomiting. Endocrine: Negative. Genitourinary: Negative. Musculoskeletal: Negative. Negative for arthralgias, back pain, gait problem, joint swelling and myalgias. Skin: Negative. Allergic/Immunologic: Negative. Neurological: Negative. Negative for dizziness, weakness and headaches. Hematological: Negative. Psychiatric/Behavioral: Negative.   Negative for confusion, decreased concentration and sleep disturbance. /88 (Site: Left Upper Arm, Position: Sitting, Cuff Size: Large Adult)   Pulse 91   Temp 98.2 °F (36.8 °C) (Infrared)   Resp 16   Ht 5' 1\" (1.549 m)   Wt 207 lb (93.9 kg)   SpO2 97%   BMI 39.11 kg/m²   Physical Exam  Constitutional:       Appearance: Normal appearance. She is obese. HENT:      Head: Normocephalic and atraumatic. Nose: Nose normal.      Mouth/Throat:      Mouth: Mucous membranes are moist.      Pharynx: Oropharynx is clear. Eyes:      Extraocular Movements: Extraocular movements intact. Pupils: Pupils are equal, round, and reactive to light. Cardiovascular:      Rate and Rhythm: Normal rate and regular rhythm. Pulses: Normal pulses. Heart sounds: Normal heart sounds. Pulmonary:      Effort: Pulmonary effort is normal.      Breath sounds: Normal breath sounds. Abdominal:      General: There is no distension. Palpations: Abdomen is soft. Tenderness: There is no abdominal tenderness. Musculoskeletal:         General: No tenderness or signs of injury. Cervical back: Normal range of motion and neck supple. Skin:     General: Skin is warm and dry. Neurological:      General: No focal deficit present. Mental Status: She is alert and oriented to person, place, and time. Psychiatric:         Mood and Affect: Mood normal.         Behavior: Behavior normal.         Thought Content: Thought content normal.         Judgment: Judgment normal.           ASSESSMENT/PLAN:  1. Family history of colorectal cancer in first-degree relative-- plan for colonoscopy    Prep: MiraLAX prep    Colonoscopy. The patient was explained the risks/benefits/alternatives/expected outcomes of the procedure. The patient was explained the risks of the procedure, including, but not limited to, the risk of reaction to the anesthesia medicine and the risk of perforation requiring further surgery.   The patient was informed that they may require biopsy or polypectomy. These procedures may increase the risk of complication. All questions were answered. The patient verbalized understanding and agreed to proceed.     Tyree Cuba MD, MSc, FACS  2/7/2022  4:11 PM 2-3 times/wk

## 2022-02-09 ENCOUNTER — TELEPHONE (OUTPATIENT)
Dept: SURGERY | Age: 47
End: 2022-02-09

## 2022-02-09 NOTE — TELEPHONE ENCOUNTER
CANDICE Mckeon called and spoke to Aide and scheduled PT for Colonoscopy on 3/30/22 @ 10:30 am with Dr. Luz Govea. PT has  received both COVID 19 vaccines. PT verbalized she understood prep instructions, and NPO after midnight. PT verbalized that she understood appointment date/time, as well as to arrive 1 hours prior to procedure. PT advised PAT will call to go over all medication and advise on where to park and enter the hospital at for procedure. PT has been told to make sure they have a ride to and from procedure as they are not allowed to drive after procedure. PT procedure letter was given to them with all instructions also on it. MA instructed PT to call the office at [phone] with any questions, comments, or concerns about the procedure.    Electronically signed by Panda Carlin on 2/9/22 at 10:47 AM EST

## 2022-02-09 NOTE — TELEPHONE ENCOUNTER
Prior Authorization Form:      DEMOGRAPHICS:                     Patient Name:  Charla Shepherd  Patient :  1975            Insurance:  Payor: Alice Garcia / Plan: Anne-Marie Mercedes / Product Type: *No Product type* /   Insurance ID Number:    Payor/Plan Subscr  Sex Relation Sub. Ins. ID Effective Group Num   1.  [address] [facility] 1975 Female Self [id number]                                   [address] [address]         DIAGNOSIS & PROCEDURE:                       Procedure/Operation: COLONOSCOPY           CPT Code: 41579    Diagnosis:  FAMILY HISTORY OF COLON CANCER    ICD10 Code: Z80.0    Location:  [facility]    Surgeon:  DR. Meaghan Villafuerte    SCHEDULING INFORMATION:                          Date: 3/30/22    Time: 10:30 AM              Anesthesia:  Local Only                                                       Status:  Outpatient        Special Comments:  N/A       Electronically signed by Carrolyn Siemens on 2022 at 10:47 AM

## 2022-02-16 ENCOUNTER — HOSPITAL ENCOUNTER (OUTPATIENT)
Dept: GENERAL RADIOLOGY | Age: 47
Discharge: HOME | End: 2022-02-18
Payer: MEDICAID

## 2022-02-16 DIAGNOSIS — Z12.31 VISIT FOR SCREENING MAMMOGRAM: ICD-10-CM

## 2022-02-16 PROCEDURE — 77063 BREAST TOMOSYNTHESIS BI: CPT

## 2022-03-15 ENCOUNTER — TELEPHONE (OUTPATIENT)
Dept: ALLERGY | Age: 47
End: 2022-03-15

## 2022-03-15 NOTE — TELEPHONE ENCOUNTER
Letter of notification of halfway sent as well as information on Antonio Lowery who is accepting his patients.

## 2022-03-22 ENCOUNTER — OFFICE VISIT (OUTPATIENT)
Dept: FAMILY MEDICINE CLINIC | Age: 47
End: 2022-03-22
Payer: MEDICAID

## 2022-03-22 VITALS
RESPIRATION RATE: 16 BRPM | DIASTOLIC BLOOD PRESSURE: 80 MMHG | WEIGHT: 210 LBS | OXYGEN SATURATION: 98 % | BODY MASS INDEX: 39.65 KG/M2 | HEART RATE: 90 BPM | SYSTOLIC BLOOD PRESSURE: 110 MMHG | TEMPERATURE: 97.5 F | HEIGHT: 61 IN

## 2022-03-22 DIAGNOSIS — J40 SINOBRONCHITIS: Primary | ICD-10-CM

## 2022-03-22 DIAGNOSIS — J32.9 SINOBRONCHITIS: Primary | ICD-10-CM

## 2022-03-22 PROCEDURE — 99213 OFFICE O/P EST LOW 20 MIN: CPT | Performed by: NURSE PRACTITIONER

## 2022-03-22 PROCEDURE — G8482 FLU IMMUNIZE ORDER/ADMIN: HCPCS | Performed by: NURSE PRACTITIONER

## 2022-03-22 PROCEDURE — G8417 CALC BMI ABV UP PARAM F/U: HCPCS | Performed by: NURSE PRACTITIONER

## 2022-03-22 PROCEDURE — G8427 DOCREV CUR MEDS BY ELIG CLIN: HCPCS | Performed by: NURSE PRACTITIONER

## 2022-03-22 PROCEDURE — 1036F TOBACCO NON-USER: CPT | Performed by: NURSE PRACTITIONER

## 2022-03-22 RX ORDER — DOXYCYCLINE HYCLATE 100 MG/1
100 CAPSULE ORAL 2 TIMES DAILY
Qty: 20 CAPSULE | Refills: 0 | Status: SHIPPED | OUTPATIENT
Start: 2022-03-22 | End: 2022-04-01

## 2022-03-22 RX ORDER — METHYLPREDNISOLONE 4 MG/1
TABLET ORAL
Qty: 1 KIT | Refills: 0 | Status: SHIPPED
Start: 2022-03-22 | End: 2022-04-12

## 2022-03-22 RX ORDER — BROMPHENIRAMINE MALEATE, PSEUDOEPHEDRINE HYDROCHLORIDE, AND DEXTROMETHORPHAN HYDROBROMIDE 2; 30; 10 MG/5ML; MG/5ML; MG/5ML
10 SYRUP ORAL 4 TIMES DAILY PRN
Qty: 240 ML | Refills: 0 | Status: SHIPPED
Start: 2022-03-22 | End: 2022-04-12

## 2022-03-22 NOTE — PROGRESS NOTES
3/22/22  Rafael Hannah : 1975 Sex: female  Age 51 y.o. Subjective:  Chief Complaint   Patient presents with    Cough     Symptoms started a month ago. She has tried mucinex, sudafed, ibuprofen.  Facial Pain    Headache       HPI:   Rafael Hannah , 51 y.o. female presents to the clinic for evaluation of sinus congestion x 1 month. The patient also reports persistent cough. The patient has taken Ibuprofen / pseudoephedrine / Claritin for symptoms. The patient reports unchanged symptoms over time. The patient denies known ill exposure. The patient denies hx of COVID-19 and reports having the vaccines and booster. The patient denies acute loss of taste and smell, headache, sore throat, rash, and fever. The patient also denies chest pain, abdominal pain, shortness of breath, and nausea / vomiting / diarrhea. ROS:   Unless otherwise stated in this report the patient's positive and negative responses for review of systems for constitutional, eyes, ENT, cardiovascular, respiratory, gastrointestinal, neurological, , musculoskeletal, and integument systems and related systems to the presenting problem are either stated in the history of present illness or were not pertinent or were negative for the symptoms and/or complaints related to the presenting medical problem. Positives and pertinent negatives as per HPI. All others reviewed and are negative.       PMH:     Past Medical History:   Diagnosis Date    Degenerative joint disease of low back 2021    Migraine 20 years    Sinusitis (chronic)        Past Surgical History:   Procedure Laterality Date    ADENOIDECTOMY      BREAST BIOPSY  approx     Patient not sure which site    CHOLECYSTECTOMY  age 24   [facility] TONSILLECTOMY  age 1    WISDOM TOOTH EXTRACTION         Family History   Problem Relation Age of Onset    Cancer Mother 29        breast, bilateral, more than once; uterine cancer    Heart Disease Mother     Asthma Mother  Stroke Mother     Migraines Mother     Arthritis Mother     Colon Cancer Mother 27        rectal cancer    High Blood Pressure Father     Cancer Father         prostate    Migraines Father     Cancer Sister 29        breast and ovarian    Stroke Sister     Breast Cancer Maternal Grandmother     Cancer Paternal Grandmother         breast    Cancer Paternal Grandfather         prostate    Migraines Sister     Breast Cancer Maternal Aunt     Cancer Maternal Aunt         ovarian cancer    Colon Cancer Maternal Aunt     Breast Cancer Paternal Aunt     Prostate Cancer Paternal Uncle     Breast Cancer Maternal Cousin         under age of 48    Cancer Maternal Cousin         ovarian    Breast Cancer Maternal Aunt     Cancer Maternal Aunt         ovarian    Colon Cancer Maternal Aunt     Breast Cancer Maternal Aunt     Breast Cancer Paternal Great Grandmother     Breast Cancer Maternal Great Grandmother     Breast Cancer Paternal Aunt     Breast Cancer Maternal Cousin         under age of 48    Breast Cancer Maternal Cousin         under age of 48    Breast Cancer Maternal Cousin         under age of 48    Breast Cancer Maternal Cousin         under age of 48       Medications:     Current Outpatient Medications:     doxycycline hyclate (VIBRAMYCIN) 100 MG capsule, Take 1 capsule by mouth 2 times daily for 10 days, Disp: 20 capsule, Rfl: 0    brompheniramine-pseudoephedrine-DM (BROMFED DM) 2-30-10 MG/5ML syrup, Take 10 mLs by mouth 4 times daily as needed for Congestion or Cough, Disp: 240 mL, Rfl: 0    methylPREDNISolone (MEDROL DOSEPACK) 4 MG tablet, Take by mouth., Disp: 1 kit, Rfl: 0    amitriptyline (ELAVIL) 150 MG tablet, TAKE ONE TABLET BY MOUTH NIGHTLY, Disp: 90 tablet, Rfl: 0    loratadine-pseudoephedrine (CLARITIN-D 12 HOUR) 5-120 MG per extended release tablet, Take 1 tablet by mouth 2 times daily as needed (sinus pressure), Disp: 60 tablet, Rfl: 3    SUMAtriptan (IMITREX) 100 MG tablet, Take one tablet  prn headache. May repeat in 2 hours. No more than 200 mg in 24 hours. Do not treat greater than 4 headaches in 30 days. , Disp: 30 tablet, Rfl: 1    mineral oil-hydrophilic petrolatum (AQUAPHOR) ointment, APPLY TOPICALLY AS NEEDED, Disp: 52.5 g, Rfl: 3    fluticasone (FLONASE) 50 MCG/ACT nasal spray, 1 spray by Each Nostril route daily, Disp: 16 g, Rfl: 2    dextran 70-hypromellose (ARTIFICIAL TEARS) 0.1-0.3 % SOLN opthalmic solution, Place 1 drop into both eyes 2 times daily, Disp: 1 each, Rfl: 1    NONFORMULARY, Take by mouth daily Gummie for hair skin and nails, Disp: , Rfl:     ibuprofen (IBU) 400 MG tablet, Take 1 tablet by mouth every 8 hours as needed for Pain Take with full glass of water, Disp: 12 tablet, Rfl: 0    acetaminophen (TYLENOL) 500 MG tablet, Take 2 tablets by mouth every 8 hours as needed for Pain, Disp: 50 tablet, Rfl: 0    Ascorbic Acid (VITAMIN C) 250 MG tablet, Take 500 mg by mouth daily Gummie, Disp: , Rfl:     Cholecalciferol (VITAMIN D3) 50 MCG (2000 UT) CAPS, Take 1 capsule by mouth daily Gummie, Disp: , Rfl:     MULTIPLE VITAMINS PO, Take 1 tablet by mouth daily Gummie, Disp: , Rfl:     melatonin 3 MG TABS tablet, Take 3 mg by mouth nightly as needed , Disp: , Rfl:     Allergies: Allergies   Allergen Reactions    Latex Anaphylaxis, Hives and Rash    Iodine Hives    Trazodone And Nefazodone Nausea And Vomiting    Bactrim Hives    Iodides Hives    Aspirin Hives and Nausea And Vomiting    Tylenol With Codeine [Acetaminophen-Codeine] Nausea And Vomiting    Ultram [Tramadol Hcl] Nausea And Vomiting    Vicodin Tuss [Hydrocodone-Guaifenesin] Nausea And Vomiting       Social History:     Social History     Tobacco Use    Smoking status: Passive Smoke Exposure - Never Smoker    Smokeless tobacco: Never Used   Vaping Use    Vaping Use: Never used   Substance Use Topics    Alcohol use:  Yes     Alcohol/week: 0.0 standard drinks     Comment: rarely     Drug use: No       Patient lives at home. Physical Exam:     Vitals:    03/22/22 1008   BP: 110/80   Pulse: 90   Resp: 16   Temp: 97.5 °F (36.4 °C)   TempSrc: Temporal   SpO2: 98%   Weight: 210 lb (95.3 kg)   Height: 5' 1\" (1.549 m)       Physical Exam (PE)    Physical Exam  Constitutional:       Appearance: Normal appearance. HENT:      Head: Normocephalic. Right Ear: Tympanic membrane, ear canal and external ear normal.      Left Ear: Tympanic membrane, ear canal and external ear normal.      Nose: Congestion and rhinorrhea present. Right Sinus: Maxillary sinus tenderness and frontal sinus tenderness present. Left Sinus: Maxillary sinus tenderness and frontal sinus tenderness present. Mouth/Throat:      Mouth: Mucous membranes are moist.      Pharynx: Oropharynx is clear. Eyes:      Pupils: Pupils are equal, round, and reactive to light. Cardiovascular:      Rate and Rhythm: Normal rate and regular rhythm. Pulses: Normal pulses. Heart sounds: Normal heart sounds. Pulmonary:      Effort: Pulmonary effort is normal.      Breath sounds: Normal breath sounds. No wheezing, rhonchi or rales. Abdominal:      General: Bowel sounds are normal.      Palpations: Abdomen is soft. Musculoskeletal:         General: Normal range of motion. Cervical back: Normal range of motion and neck supple. Lymphadenopathy:      Cervical: No cervical adenopathy. Skin:     General: Skin is warm and dry. Capillary Refill: Capillary refill takes less than 2 seconds. Neurological:      General: No focal deficit present. Mental Status: She is alert and oriented to person, place, and time. Psychiatric:         Mood and Affect: Mood normal.         Behavior: Behavior normal.          Testing:   (All laboratory and radiology results have been personally reviewed by myself)  Labs:  No results found for this visit on 03/22/22. Imaging:   All Radiology results interpreted by Radiologist unless otherwise noted. No orders to display       Assessment / Plan:   The patient's vitals, allergies, medications, and past medical history have been reviewed. Bryan Cox was seen today for cough, facial pain and headache. Diagnoses and all orders for this visit:    Sinobronchitis  -     doxycycline hyclate (VIBRAMYCIN) 100 MG capsule; Take 1 capsule by mouth 2 times daily for 10 days  -     brompheniramine-pseudoephedrine-DM (BROMFED DM) 2-30-10 MG/5ML syrup; Take 10 mLs by mouth 4 times daily as needed for Congestion or Cough  -     methylPREDNISolone (MEDROL DOSEPACK) 4 MG tablet; Take by mouth.        - Disposition: Home    - Educational material printed for patient's review and were included in patient instructions. After Visit Summary was given to patient at the end of visit. - Encouraged oral fluids and rest. Discussed symptomatic treatments with patient today including Tylenol prn for fever / pain. Schedule a follow-up with PCP in 2-3 days. Red flag symptoms were discussed with the patient today. The patient is directed to go the ED if symptoms change or worsen. Pt verbalizes understanding and is in agreement with plan of care. All questions answered. SIGNATURE: NANCY Pearson-CNP    *NOTE: This report was transcribed using voice recognition software. Every effort was made to ensure accuracy; however, inadvertent computerized transcription errors may be present.

## 2022-03-22 NOTE — PATIENT INSTRUCTIONS
Patient Education        Bronchitis: Care Instructions  Your Care Instructions     Bronchitis is inflammation of the bronchial tubes, which carry air to the lungs. The tubes swell and produce mucus, or phlegm. The mucus and inflamed bronchial tubes make you cough. You may have trouble breathing. Most cases of bronchitis are caused by viruses like those that cause colds. Antibiotics usually do not help and they may be harmful. Bronchitis usually develops rapidly and lasts about 2 to 3 weeks in otherwise healthy people. Follow-up care is a key part of your treatment and safety. Be sure to make and go to all appointments, and call your doctor if you are having problems. It's also a good idea to know your test results and keep a list of the medicines you take. How can you care for yourself at home? · Take all medicines exactly as prescribed. Call your doctor if you think you are having a problem with your medicine. · Get some extra rest.  · Take an over-the-counter pain medicine, such as acetaminophen (Tylenol), ibuprofen (Advil, Motrin), or naproxen (Aleve) to reduce fever and relieve body aches. Read and follow all instructions on the label. · Do not take two or more pain medicines at the same time unless the doctor told you to. Many pain medicines have acetaminophen, which is Tylenol. Too much acetaminophen (Tylenol) can be harmful. · Take an over-the-counter cough medicine to help quiet a dry, hacking cough so that you can sleep. Avoid cough medicines that have more than one active ingredient. Read and follow all instructions on the label. · Do not smoke. Smoking can make bronchitis worse. If you need help quitting, talk to your doctor about stop-smoking programs and medicines. These can increase your chances of quitting for good. When should you call for help? Call 911 anytime you think you may need emergency care. For example, call if:    · You have severe trouble breathing.    Call your doctor now or seek immediate medical care if:    · You have new or worse trouble breathing.     · You cough up dark brown or bloody mucus (sputum).     · You have a new or higher fever.     · You have a new rash. Watch closely for changes in your health, and be sure to contact your doctor if:    · You cough more deeply or more often, especially if you notice more mucus or a change in the color of your mucus.     · You are not getting better as expected. Where can you learn more? Go to https://Buzzwire.Fuzmo. org and sign in to your [employer] account. Enter H333 in the FindIt box to learn more about \"Bronchitis: Care Instructions. \"     If you do not have an account, please click on the \"Sign Up Now\" link. Current as of: July 6, 2021               Content Version: 13.1  © 8060-1304 [employer], [employer]orporated. Care instructions adapted under license by [facility] ([facility]). If you have questions about a medical condition or this instruction, always ask your healthcare professional. [address] any warranty or liability for your use of this information.

## 2022-03-29 ENCOUNTER — TELEPHONE (OUTPATIENT)
Dept: INTERNAL MEDICINE | Age: 47
End: 2022-03-29

## 2022-03-29 ENCOUNTER — TELEPHONE (OUTPATIENT)
Dept: SURGERY | Age: 47
End: 2022-03-29

## 2022-03-29 NOTE — TELEPHONE ENCOUNTER
MA received a message form pt stating that she needs to reschedule her colonoscopy tomorrow, MA attempted to call pt twice and left a  message with office contact information for pt to return call to reschedule, MA canceled tomorrows scope with surgery scheduling, MA awaiting return call to reschedule  Electronically signed by Jaswinder Ramos on 3/29/2022 at 1:27 PM

## 2022-03-29 NOTE — TELEPHONE ENCOUNTER
Called pt but spoke to family member states she 's sick informed she had an appt. Today they said We know and she was there , but I said it shows she never showed up , family member states she doesn't know if she registered when she came , but was seen recently at [facility] tested negative for Covid but was told has Bronchitis  And family member states she may need more medicine . I offered to schedule appt they said they would call back and schedule there  Appt. Informed if her condition worsens , such as respiratory problems , or cough and elevated Fever to go to nearest ER.  Family member verbalized understanding of instructions

## 2022-03-31 ENCOUNTER — SCHEDULED TELEPHONE ENCOUNTER (OUTPATIENT)
Dept: OBGYN | Age: 47
End: 2022-03-31

## 2022-03-31 DIAGNOSIS — Z91.89 INCREASED RISK OF BREAST CANCER: Primary | ICD-10-CM

## 2022-03-31 PROCEDURE — VIRTUALHLTH VIRTUAL HEALTH SAME DAY: Performed by: OBSTETRICS & GYNECOLOGY

## 2022-03-31 NOTE — PROGRESS NOTES
Darcie Patel is a 51 y.o. female evaluated via telephone on 3/31/2022 for No chief complaint on file. .        Documentation:  Called patient to review elevated Sueanne Mor on mammogram. Patient is aware of this and had a breast MRI last year. Patient agrees to continue with q6 months imaging. Breast MRI ordered to be done this summer. All questions were answered. Total Time: minutes: <5 minutes (not billable)    Darcie Patel was evaluated through a synchronous (real-time) audio encounter. Patient identification was verified at the start of the visit. She (or guardian if applicable) is aware that this is a billable service, which includes applicable co-pays. This visit was conducted with the patient's (and/or legal guardian's) verbal consent. She has not had a related appointment within my department in the past 7 days or scheduled within the next 24 hours. The patient was located in a state where the provider was licensed to provide care.     Note: not billable if this call serves to triage the patient into an appointment for the relevant concern    Ericka Ordonez MD

## 2022-04-12 ENCOUNTER — OFFICE VISIT (OUTPATIENT)
Dept: FAMILY MEDICINE CLINIC | Age: 47
End: 2022-04-12
Payer: MEDICAID

## 2022-04-12 VITALS
BODY MASS INDEX: 38.71 KG/M2 | HEART RATE: 92 BPM | WEIGHT: 205 LBS | TEMPERATURE: 98.2 F | OXYGEN SATURATION: 96 % | HEIGHT: 61 IN | RESPIRATION RATE: 16 BRPM | DIASTOLIC BLOOD PRESSURE: 80 MMHG | SYSTOLIC BLOOD PRESSURE: 124 MMHG

## 2022-04-12 DIAGNOSIS — J32.9 SINOBRONCHITIS: ICD-10-CM

## 2022-04-12 DIAGNOSIS — L20.82 FLEXURAL ECZEMA: ICD-10-CM

## 2022-04-12 DIAGNOSIS — R11.0 NAUSEOUS: ICD-10-CM

## 2022-04-12 DIAGNOSIS — G43.109 MIGRAINE WITH AURA AND WITHOUT STATUS MIGRAINOSUS, NOT INTRACTABLE: Chronic | ICD-10-CM

## 2022-04-12 DIAGNOSIS — J32.8 OTHER CHRONIC SINUSITIS: Chronic | ICD-10-CM

## 2022-04-12 DIAGNOSIS — J40 SINOBRONCHITIS: ICD-10-CM

## 2022-04-12 DIAGNOSIS — J32.8 OTHER CHRONIC SINUSITIS: Primary | Chronic | ICD-10-CM

## 2022-04-12 LAB
BASOPHILS ABSOLUTE: 0.05 E9/L (ref 0–0.2)
BASOPHILS RELATIVE PERCENT: 0.8 % (ref 0–2)
EOSINOPHILS ABSOLUTE: 0.13 E9/L (ref 0.05–0.5)
EOSINOPHILS RELATIVE PERCENT: 2.1 % (ref 0–6)
HCT VFR BLD CALC: 38.1 % (ref 34–48)
HEMOGLOBIN: 11.4 G/DL (ref 11.5–15.5)
IMMATURE GRANULOCYTES #: 0.03 E9/L
IMMATURE GRANULOCYTES %: 0.5 % (ref 0–5)
LYMPHOCYTES ABSOLUTE: 1.67 E9/L (ref 1.5–4)
LYMPHOCYTES RELATIVE PERCENT: 26.4 % (ref 20–42)
MCH RBC QN AUTO: 24.9 PG (ref 26–35)
MCHC RBC AUTO-ENTMCNC: 29.9 % (ref 32–34.5)
MCV RBC AUTO: 83.4 FL (ref 80–99.9)
MONOCYTES ABSOLUTE: 0.47 E9/L (ref 0.1–0.95)
MONOCYTES RELATIVE PERCENT: 7.4 % (ref 2–12)
NEUTROPHILS ABSOLUTE: 3.98 E9/L (ref 1.8–7.3)
NEUTROPHILS RELATIVE PERCENT: 62.8 % (ref 43–80)
PDW BLD-RTO: 16.2 FL (ref 11.5–15)
PLATELET # BLD: 339 E9/L (ref 130–450)
PMV BLD AUTO: 10 FL (ref 7–12)
RBC # BLD: 4.57 E12/L (ref 3.5–5.5)
WBC # BLD: 6.3 E9/L (ref 4.5–11.5)

## 2022-04-12 PROCEDURE — 99214 OFFICE O/P EST MOD 30 MIN: CPT | Performed by: INTERNAL MEDICINE

## 2022-04-12 PROCEDURE — G8417 CALC BMI ABV UP PARAM F/U: HCPCS | Performed by: INTERNAL MEDICINE

## 2022-04-12 PROCEDURE — 1036F TOBACCO NON-USER: CPT | Performed by: INTERNAL MEDICINE

## 2022-04-12 PROCEDURE — G8427 DOCREV CUR MEDS BY ELIG CLIN: HCPCS | Performed by: INTERNAL MEDICINE

## 2022-04-12 RX ORDER — AMITRIPTYLINE HYDROCHLORIDE 150 MG/1
TABLET, FILM COATED ORAL
Qty: 90 TABLET | Refills: 3 | Status: SHIPPED
Start: 2022-04-12 | End: 2022-08-08 | Stop reason: SDUPTHER

## 2022-04-12 RX ORDER — CETIRIZINE HYDROCHLORIDE, PSEUDOEPHEDRINE HYDROCHLORIDE 5; 120 MG/1; MG/1
1 TABLET, FILM COATED, EXTENDED RELEASE ORAL 2 TIMES DAILY
Qty: 180 TABLET | Refills: 1 | Status: SHIPPED | OUTPATIENT
Start: 2022-04-12 | End: 2022-05-12

## 2022-04-12 RX ORDER — ONDANSETRON 4 MG/1
4 TABLET, ORALLY DISINTEGRATING ORAL 3 TIMES DAILY PRN
Qty: 30 TABLET | Refills: 0 | Status: SHIPPED | OUTPATIENT
Start: 2022-04-12 | End: 2022-04-22

## 2022-04-12 RX ORDER — SUMATRIPTAN 100 MG/1
TABLET, FILM COATED ORAL
Qty: 90 TABLET | Refills: 3 | Status: SHIPPED
Start: 2022-04-12 | End: 2022-08-08 | Stop reason: SDUPTHER

## 2022-04-12 ASSESSMENT — PATIENT HEALTH QUESTIONNAIRE - PHQ9
SUM OF ALL RESPONSES TO PHQ QUESTIONS 1-9: 1
SUM OF ALL RESPONSES TO PHQ9 QUESTIONS 1 & 2: 1
SUM OF ALL RESPONSES TO PHQ QUESTIONS 1-9: 1
1. LITTLE INTEREST OR PLEASURE IN DOING THINGS: 0
SUM OF ALL RESPONSES TO PHQ QUESTIONS 1-9: 1
2. FEELING DOWN, DEPRESSED OR HOPELESS: 1
SUM OF ALL RESPONSES TO PHQ QUESTIONS 1-9: 1

## 2022-04-12 NOTE — PROGRESS NOTES
[facility] PRIMARY CARE  [address] [place] [address]  Dept: [phone]  Dept Fax: [phone]     NAME: Lorena Carranza        :  1975        MRN:  [id number]    Chief Complaint   Patient presents with   [facility]     Previous pcp was Dr. Leigh Ann Quiroz    Eczema    Allergies    Facial Pain     Has had for over 3 weeks. She was seen in walk in 3/22. History of Present Illness  Lorena Carranza is a 51 y.o. female who presents today to [facility] care. Prior PCP: Internal medicine resident clinic     Patient has had a long history of sinus issues and allergies since childhood. She has been taking Claritin-D and saline solution chronically with as needed ibuprofen. Was recently treated with a course of doxycycline, bromfed, and steroids though walk-in care. Symptoms persist.   States she has a cough and post-nasal drip currently. She also chronically suffers from eczema which is usually controlled with the application of topical Aquaphor. She reports that recently she has been sleeping a lot. She has been sleeping upwards of 10-11hrs each night. Review of Systems  Please see HPI above. All bolded are positive.   Gen: fever, chills, fatigue, weakness, diaphoresis, or unintentional weight change  Head: headache, vision change, hearing loss, sinus pressure  Chest: chest pain/heaviness, palpitations  Lungs: shortness of breath, wheezing, coughing, hemoptysis  Abdomen: abdominal pain, nausea, vomiting, diarrhea, constipation, melena, hematochezia, hematemesis, or loss of appetite  Extremities: lower extremity edema, myalgias, arthralgias  Urinary: dysuria, hematuria, weak flow, or increase in frequency  Neurologic: lightheadedness, dizziness, confusion, syncope  Endocrine: polydipsia, polyuria, heat or cold intolerance  Psychiatric: depression, suicidal ideation, anxiety  Derm: Rashes, ulcers, burns, eczema    Medical History   Past Medical History:   Diagnosis Date    Degenerative joint disease of low back 2/11/2021    Migraine 20 years    Sinusitis (chronic)        Surgical History   Past Surgical History:   Procedure Laterality Date    ADENOIDECTOMY      BREAST BIOPSY  approx 2020    Patient not sure which site    CHOLECYSTECTOMY  age 24   Bernard Spina TONSILLECTOMY  age 1   Bernard Spina WISDOM TOOTH EXTRACTION         Family History  Family History   Problem Relation Age of Onset    Cancer Mother 29        breast, bilateral, more than once; uterine cancer    Heart Disease Mother     Asthma Mother     Stroke Mother     Migraines Mother     Arthritis Mother     Colon Cancer Mother 27        rectal cancer    High Blood Pressure Father     Cancer Father         prostate    Migraines Father     Cancer Sister 29        breast and ovarian    Stroke Sister     Breast Cancer Maternal Grandmother     Cancer Paternal Grandmother         breast    Cancer Paternal Grandfather         prostate    Migraines Sister     Breast Cancer Maternal Aunt     Cancer Maternal Aunt         ovarian cancer    Colon Cancer Maternal Aunt     Breast Cancer Paternal Aunt     Prostate Cancer Paternal Uncle     Breast Cancer Maternal Cousin         under age of 48    Cancer Maternal Cousin         ovarian    Breast Cancer Maternal Aunt     Cancer Maternal Aunt         ovarian    Colon Cancer Maternal Aunt     Breast Cancer Maternal Aunt     Breast Cancer Paternal Great Grandmother     Breast Cancer Maternal Great Grandmother     Breast Cancer Paternal Aunt     Breast Cancer Maternal Cousin         under age of 48    Breast Cancer Maternal Cousin         under age of 48    Breast Cancer Maternal Cousin         under age of 48    Breast Cancer Maternal Cousin         under age of 48       Social History  Social History     Tobacco Use    Smoking status: Never Smoker    Smokeless tobacco: Never Used   Substance Use Topics    Alcohol use:  Yes Alcohol/week: 0.0 standard drinks     Comment: rarely        Home Medications  Current Outpatient Medications   Medication Sig Dispense Refill    Cyanocobalamin (VITAMIN B12 PO) Take by mouth daily      ondansetron (ZOFRAN-ODT) 4 MG disintegrating tablet Take 1 tablet by mouth 3 times daily as needed for Nausea or Vomiting 30 tablet 0    cetirizine-psuedoephedrine (ZYRTEC-D) 5-120 MG per extended release tablet Take 1 tablet by mouth 2 times daily 180 tablet 1    amitriptyline (ELAVIL) 150 MG tablet TAKE ONE TABLET BY MOUTH NIGHTLY 90 tablet 3    SUMAtriptan (IMITREX) 100 MG tablet Take one tablet  prn headache. May repeat in 2 hours. No more than 200 mg in 24 hours. Do not treat greater than 4 headaches in 30 days. 90 tablet 3    mineral oil-hydrophilic petrolatum (AQUAPHOR) ointment APPLY TOPICALLY AS NEEDED 52.5 g 3    NONFORMULARY Take by mouth daily Gummie for hair skin and nails      ibuprofen (IBU) 400 MG tablet Take 1 tablet by mouth every 8 hours as needed for Pain Take with full glass of water 12 tablet 0    Ascorbic Acid (VITAMIN C) 250 MG tablet Take 500 mg by mouth daily Gummie      Cholecalciferol (VITAMIN D3) 50 MCG (2000 UT) CAPS Take 1 capsule by mouth daily Gummie      MULTIPLE VITAMINS PO Take 1 tablet by mouth daily Gummie      melatonin 3 MG TABS tablet Take 3 mg by mouth nightly as needed        No current facility-administered medications for this visit.         Allergies  Allergies   Allergen Reactions    Latex Anaphylaxis, Hives and Rash    Iodine Hives    Trazodone And Nefazodone Nausea And Vomiting    Bactrim Hives    Iodides Hives    Aspirin Hives and Nausea And Vomiting    Tylenol With Codeine [Acetaminophen-Codeine] Nausea And Vomiting    Ultram [Tramadol Hcl] Nausea And Vomiting    Vicodin Tuss [Hydrocodone-Guaifenesin] Nausea And Vomiting       Objective  Vitals:    04/12/22 1015   BP: 124/80   Pulse: 92   Resp: 16   Temp: 98.2 °F (36.8 °C)   TempSrc: Temporal SpO2: 96%   Weight: 205 lb (93 kg)   Height: 5' 1\" (1.549 m)        Physical Exam:  General: Awake, alert, and oriented to person, place, time, and purpose, appears stated age and cooperative, No acute distress  Head: Normocephalic, atraumatic  Eyes: conjunctivae/corneas clear, EOM's intact. Mouth: Mucous membranes moist with no pharyngeal exudate or erythema  Neck: no JVD, no adenopathy, no carotid bruit, supple, symmetrical, trachea midline  Back: symmetric, ROM normal, No CVA tenderness. Lungs: clear to auscultation bilaterally without wheezes, rales, or rhonchi  Heart: regular rate and rhythm, S1, S2 normal, no murmur, click, rub or gallop  Abdomen: soft, non-tender; bowel sounds normal; no masses,  no organomegaly  Extremities: atraumatic, no cyanosis, no edema  Skin: color, texture, turgor within normal limits. Dry skin  Neurologic: speech appropriate, moves all 4 extremities, normal muscle strength and tone, CN 2-12 grossly intact    Labs  Lab Results   Component Value Date    WBC 6.3 04/12/2022    HGB 11.4 (L) 04/12/2022    HCT 38.1 04/12/2022     04/12/2022     05/24/2019    K 4.1 05/24/2019     05/24/2019    CREATININE 0.9 06/03/2021    BUN 9 06/03/2021    CO2 25 05/24/2019    GLUCOSE 108 (H) 05/24/2019    ALT 24 05/24/2019    AST 22 05/24/2019     Lab Results   Component Value Date    TSH 1.220 12/05/2013     Lab Results   Component Value Date    TRIG 87 07/19/2016     Lab Results   Component Value Date    HDL 45 07/19/2016     Lab Results   Component Value Date    LDLCALC 106 (H) 07/19/2016     Lab Results   Component Value Date    LABA1C 5.4 06/07/2018     No results found for: INR, PROTIME   *All recent labs were reviewed. Please see electronic chart for a more comprehensive set of labs    Radiology  No results found.                                Health Maintenance Due   Topic Date Due    Hepatitis C screen  Never done    Colorectal Cancer Screen  Never done    Diabetes screen  06/07/2021    Lipid screen  07/19/2021         Assessment and [facility] presents today to establish care. Queen Nannette was seen today for establish care, eczema, allergies and facial pain. Diagnoses and all orders for this visit:    Other chronic sinusitis  -     cetirizine-psuedoephedrine (ZYRTEC-D) 5-120 MG per extended release tablet; Take 1 tablet by mouth 2 times daily  -     CBC with Auto Differential; Future    Flexural eczema  -     cetirizine-psuedoephedrine (ZYRTEC-D) 5-120 MG per extended release tablet; Take 1 tablet by mouth 2 times daily  -     mineral oil-hydrophilic petrolatum (AQUAPHOR) ointment; APPLY TOPICALLY AS NEEDED  -     CBC with Auto Differential; Future    Nauseous  -     ondansetron (ZOFRAN-ODT) 4 MG disintegrating tablet; Take 1 tablet by mouth 3 times daily as needed for Nausea or Vomiting    Sinobronchitis    Migraine with aura and without status migrainosus, not intractable  -     amitriptyline (ELAVIL) 150 MG tablet; TAKE ONE TABLET BY MOUTH NIGHTLY  -     SUMAtriptan (IMITREX) 100 MG tablet; Take one tablet  prn headache. May repeat in 2 hours. No more than 200 mg in 24 hours. Do not treat greater than 4 headaches in 30 days. Most recent lab results and office notes from prior PCP and other specialists were reviewed. Educational materials and/or home exercises printed for patient's review and were included in patient instructions on his/her After Visit Summary and given to patient at the end of visit. Counseled regarding above diagnosis, including possible risks and complications, especially if left uncontrolled. Counseled regarding the possible side effects, risks, benefits and alternatives to treatment; patient and/or guardian verbalizes understanding, agrees, feels comfortable with and wishes to proceed with above treatment plan.      Advised patient to call [facility] new medication issues, and read all Rx info from pharmacy to assure aware of all possible risks and side effects of medication before taking. Reviewed age and gender appropriate health screening exams and vaccinations. Advised patient regarding importance of keeping up with recommended health maintenance and to schedule as soon as possible if overdue, as this is important in assessing for undiagnosed pathology, especially cancer, as well as protecting against potentially harmful/life threatening disease. Patient verbalizes understanding and agrees with above counseling, assessment and plan. All questions answered.     Rm Artist, DO

## 2022-04-14 ENCOUNTER — TELEPHONE (OUTPATIENT)
Dept: FAMILY MEDICINE CLINIC | Age: 47
End: 2022-04-14

## 2022-06-29 ENCOUNTER — CLINICAL DOCUMENTATION (OUTPATIENT)
Dept: GENERAL RADIOLOGY | Age: 47
End: 2022-06-29

## 2022-06-29 NOTE — PROGRESS NOTES
Authorization for breast MRI (CPT 70293) has been obtained from [facility]. Authorization # [id number], valid until 9-15-22.

## 2022-07-13 ENCOUNTER — HOSPITAL ENCOUNTER (OUTPATIENT)
Dept: MRI IMAGING | Age: 47
Discharge: HOME | End: 2022-07-15
Payer: MEDICAID

## 2022-07-13 DIAGNOSIS — Z91.89 INCREASED RISK OF BREAST CANCER: ICD-10-CM

## 2022-07-13 PROCEDURE — 6360000004 HC RX CONTRAST MEDICATION: Performed by: RADIOLOGY

## 2022-07-13 PROCEDURE — C8908 MRI W/O FOL W/CONT, BREAST,: HCPCS

## 2022-07-13 PROCEDURE — A9585 GADOBUTROL INJECTION: HCPCS | Performed by: RADIOLOGY

## 2022-07-13 RX ADMIN — GADOBUTROL 9 ML: 604.72 INJECTION INTRAVENOUS at 12:21

## 2022-08-08 ENCOUNTER — OFFICE VISIT (OUTPATIENT)
Dept: FAMILY MEDICINE CLINIC | Age: 47
End: 2022-08-08
Payer: MEDICAID

## 2022-08-08 VITALS
TEMPERATURE: 96.8 F | OXYGEN SATURATION: 97 % | DIASTOLIC BLOOD PRESSURE: 74 MMHG | BODY MASS INDEX: 40.56 KG/M2 | HEIGHT: 60 IN | WEIGHT: 206.6 LBS | HEART RATE: 97 BPM | SYSTOLIC BLOOD PRESSURE: 100 MMHG | RESPIRATION RATE: 16 BRPM

## 2022-08-08 DIAGNOSIS — J40 SINOBRONCHITIS: Primary | ICD-10-CM

## 2022-08-08 DIAGNOSIS — G43.109 MIGRAINE WITH AURA AND WITHOUT STATUS MIGRAINOSUS, NOT INTRACTABLE: Chronic | ICD-10-CM

## 2022-08-08 DIAGNOSIS — J32.9 SINOBRONCHITIS: Primary | ICD-10-CM

## 2022-08-08 PROCEDURE — G8427 DOCREV CUR MEDS BY ELIG CLIN: HCPCS | Performed by: NURSE PRACTITIONER

## 2022-08-08 PROCEDURE — G8417 CALC BMI ABV UP PARAM F/U: HCPCS | Performed by: NURSE PRACTITIONER

## 2022-08-08 PROCEDURE — 99213 OFFICE O/P EST LOW 20 MIN: CPT | Performed by: NURSE PRACTITIONER

## 2022-08-08 PROCEDURE — 1036F TOBACCO NON-USER: CPT | Performed by: NURSE PRACTITIONER

## 2022-08-08 RX ORDER — ALBUTEROL SULFATE 90 UG/1
2 AEROSOL, METERED RESPIRATORY (INHALATION) EVERY 4 HOURS PRN
Qty: 1 EACH | Refills: 0 | Status: SHIPPED | OUTPATIENT
Start: 2022-08-08

## 2022-08-08 RX ORDER — AMITRIPTYLINE HYDROCHLORIDE 150 MG/1
TABLET, FILM COATED ORAL
Qty: 90 TABLET | Refills: 3 | Status: SHIPPED | OUTPATIENT
Start: 2022-08-08

## 2022-08-08 RX ORDER — DOXYCYCLINE HYCLATE 100 MG/1
100 CAPSULE ORAL 2 TIMES DAILY
Qty: 20 CAPSULE | Refills: 0 | Status: SHIPPED | OUTPATIENT
Start: 2022-08-08 | End: 2022-08-18

## 2022-08-08 RX ORDER — SUMATRIPTAN 100 MG/1
TABLET, FILM COATED ORAL
Qty: 90 TABLET | Refills: 3 | Status: SHIPPED | OUTPATIENT
Start: 2022-08-08

## 2022-08-08 RX ORDER — BROMPHENIRAMINE MALEATE, PSEUDOEPHEDRINE HYDROCHLORIDE, AND DEXTROMETHORPHAN HYDROBROMIDE 2; 30; 10 MG/5ML; MG/5ML; MG/5ML
10 SYRUP ORAL EVERY 6 HOURS PRN
Qty: 240 ML | Refills: 0 | Status: SHIPPED | OUTPATIENT
Start: 2022-08-08

## 2022-08-08 NOTE — TELEPHONE ENCOUNTER
Last Appointment:  4/12/2022  Future Appointments   Date Time Provider Kristina Mora   8/8/2022 10:10 AM Gregory Charles., APRN - CNP [facility]   8/12/2022  8:00 AM Shmuel Gandhi [facility]   8/29/2022  9:45 AM Clementina Anderson MD [address] [facility]

## 2022-08-08 NOTE — PROGRESS NOTES
22  Carol Ann Avila : 1975 Sex: female  Age 49 y.o. Subjective:  Chief Complaint   Patient presents with    Sinus Problem     X 2 weeks    Congestion    Cough     fatigue       HPI:   Carol Ann Avila , 49 y.o. female presents to the clinic for evaluation of sinus congestion x 2 weeks. The patient also reports headache, MATTA, cough, and fatigue. The patient has taken Sudafed for symptoms. The patient reports worsening symptoms over time. The patient denies ill exposure. The patient denies hx of COVID-19 and reports having the vaccines. The patient denies acute loss of taste and smell, sore throat, rash, and fever. The patient also denies chest pain, abdominal pain, and nausea / vomiting / diarrhea. ROS:   Unless otherwise stated in this report the patient's positive and negative responses for review of systems for constitutional, eyes, ENT, cardiovascular, respiratory, gastrointestinal, neurological, , musculoskeletal, and integument systems and related systems to the presenting problem are either stated in the history of present illness or were not pertinent or were negative for the symptoms and/or complaints related to the presenting medical problem. Positives and pertinent negatives as per HPI. All others reviewed and are negative.       PMH:     Past Medical History:   Diagnosis Date    Degenerative joint disease of low back 2021    Migraine 20 years    Sinusitis (chronic)        Past Surgical History:   Procedure Laterality Date    ADENOIDECTOMY      BREAST BIOPSY  approx     Patient not sure which site    CHOLECYSTECTOMY  age 24    TONSILLECTOMY  age 1    WISDOM TOOTH EXTRACTION         Family History   Problem Relation Age of Onset    Cancer Mother 29        breast, bilateral, more than once; uterine cancer    Heart Disease Mother     Asthma Mother     Stroke Mother     Migraines Mother     Arthritis Mother     Colon Cancer Mother 27        rectal cancer    High Blood Pressure Father     Cancer Father         prostate    Migraines Father     Cancer Sister 29        breast and ovarian    Stroke Sister     Breast Cancer Maternal Grandmother     Cancer Paternal Grandmother         breast    Cancer Paternal Grandfather         prostate    Migraines Sister     Breast Cancer Maternal Aunt     Cancer Maternal Aunt         ovarian cancer    Colon Cancer Maternal Aunt     Breast Cancer Paternal Aunt     Prostate Cancer Paternal Uncle     Breast Cancer Maternal Cousin         under age of 48    Cancer Maternal Cousin         ovarian    Breast Cancer Maternal Aunt     Cancer Maternal Aunt         ovarian    Colon Cancer Maternal Aunt     Breast Cancer Maternal Aunt     Breast Cancer Paternal Great Grandmother     Breast Cancer Maternal Great Grandmother     Breast Cancer Paternal Aunt     Breast Cancer Maternal Cousin         under age of 48    Breast Cancer Maternal Cousin         under age of 48    Breast Cancer Maternal Cousin         under age of 48    Breast Cancer Maternal Cousin         under age of 48       Medications:     Current Outpatient Medications:     doxycycline hyclate (VIBRAMYCIN) 100 MG capsule, Take 1 capsule by mouth in the morning and 1 capsule before bedtime. Do all this for 10 days. , Disp: 20 capsule, Rfl: 0    albuterol sulfate HFA (PROVENTIL;VENTOLIN;PROAIR) 108 (90 Base) MCG/ACT inhaler, Inhale 2 puffs into the lungs every 4 hours as needed for Wheezing or Shortness of Breath, Disp: 1 each, Rfl: 0    brompheniramine-pseudoephedrine-DM (BROMFED DM) 2-30-10 MG/5ML syrup, Take 10 mLs by mouth every 6 hours as needed for Congestion or Cough, Disp: 240 mL, Rfl: 0    Cyanocobalamin (VITAMIN B12 PO), Take by mouth daily, Disp: , Rfl:     mineral oil-hydrophilic petrolatum (AQUAPHOR) ointment, APPLY TOPICALLY AS NEEDED, Disp: 52.5 g, Rfl: 3    NONFORMULARY, Take by mouth daily Gummie for hair skin and nails, Disp: , Rfl:     ibuprofen (IBU) 400 MG tablet, Take 1 tablet by mouth every 8 hours as needed for Pain Take with full glass of water, Disp: 12 tablet, Rfl: 0    Ascorbic Acid (VITAMIN C) 250 MG tablet, Take 500 mg by mouth daily Gummie, Disp: , Rfl:     Cholecalciferol (VITAMIN D3) 50 MCG (2000 UT) CAPS, Take 1 capsule by mouth daily Gummie, Disp: , Rfl:     MULTIPLE VITAMINS PO, Take 1 tablet by mouth daily Gummie, Disp: , Rfl:     melatonin 3 MG TABS tablet, Take 3 mg by mouth nightly as needed , Disp: , Rfl:     amitriptyline (ELAVIL) 150 MG tablet, TAKE ONE TABLET BY MOUTH NIGHTLY, Disp: 90 tablet, Rfl: 3    SUMAtriptan (IMITREX) 100 MG tablet, Take one tablet  prn headache. May repeat in 2 hours. No more than 200 mg in 24 hours. Do not treat greater than 4 headaches in 30 days. , Disp: 90 tablet, Rfl: 3    Allergies: Allergies   Allergen Reactions    Latex Anaphylaxis, Hives and Rash    Iodine Hives    Trazodone And Nefazodone Nausea And Vomiting    Bactrim Hives    Iodides Hives    Aspirin Hives and Nausea And Vomiting    Tylenol With Codeine [Acetaminophen-Codeine] Nausea And Vomiting    Ultram [Tramadol Hcl] Nausea And Vomiting    Vicodin Tuss [Hydrocodone-Guaifenesin] Nausea And Vomiting       Social History:     Social History     Tobacco Use    Smoking status: Never    Smokeless tobacco: Never   Vaping Use    Vaping Use: Never used   Substance Use Topics    Alcohol use: Yes     Alcohol/week: 0.0 standard drinks     Comment: rarely     Drug use: No       Patient lives at home. Physical Exam:     Vitals:    08/08/22 1003   BP: 100/74   Site: Right Upper Arm   Position: Sitting   Cuff Size: Large Adult   Pulse: 97   Resp: 16   Temp: 96.8 °F (36 °C)   TempSrc: Infrared   SpO2: 97%   Weight: 206 lb 9.6 oz (93.7 kg)   Height: 5' (1.524 m)       Physical Exam (PE)    Physical Exam  Constitutional:       Appearance: Normal appearance. HENT:      Head: Normocephalic.       Right Ear: Tympanic membrane, ear canal and external ear normal.      Left Ear: Tympanic membrane, ear canal and external ear normal.      Nose: Congestion and rhinorrhea present. Right Turbinates: Swollen. Left Turbinates: Swollen. Right Sinus: Maxillary sinus tenderness and frontal sinus tenderness present. Left Sinus: Maxillary sinus tenderness and frontal sinus tenderness present. Mouth/Throat:      Mouth: Mucous membranes are moist.      Pharynx: Oropharynx is clear. Eyes:      Pupils: Pupils are equal, round, and reactive to light. Cardiovascular:      Rate and Rhythm: Normal rate and regular rhythm. Pulses: Normal pulses. Heart sounds: Normal heart sounds. Pulmonary:      Effort: Pulmonary effort is normal.      Breath sounds: Normal breath sounds. No wheezing, rhonchi or rales. Abdominal:      General: Bowel sounds are normal.      Palpations: Abdomen is soft. Musculoskeletal:         General: Normal range of motion. Cervical back: Normal range of motion and neck supple. Lymphadenopathy:      Cervical: No cervical adenopathy. Skin:     General: Skin is warm and dry. Capillary Refill: Capillary refill takes less than 2 seconds. Neurological:      General: No focal deficit present. Mental Status: She is alert and oriented to person, place, and time. Psychiatric:         Mood and Affect: Mood normal.         Behavior: Behavior normal.        Testing:   (All laboratory and radiology results have been personally reviewed by myself)  Labs:  No results found for this visit on 08/08/22. Imaging: All Radiology results interpreted by Radiologist unless otherwise noted. No orders to display       Assessment / Plan:   The patient's vitals, allergies, medications, and past medical history have been reviewed. Mikayla Ojeda was seen today for sinus problem, congestion and cough. Diagnoses and all orders for this visit:    Sinobronchitis  -     doxycycline hyclate (VIBRAMYCIN) 100 MG capsule;  Take 1 capsule by mouth in the morning and 1 capsule before bedtime. Do all this for 10 days. -     albuterol sulfate HFA (PROVENTIL;VENTOLIN;PROAIR) 108 (90 Base) MCG/ACT inhaler; Inhale 2 puffs into the lungs every 4 hours as needed for Wheezing or Shortness of Breath  -     brompheniramine-pseudoephedrine-DM (BROMFED DM) 2-30-10 MG/5ML syrup; Take 10 mLs by mouth every 6 hours as needed for Congestion or Cough      - Disposition: Home    - Educational material printed for patient's review and were included in patient instructions. After Visit Summary was given to patient at the end of visit. - Encouraged oral fluids and rest. Discussed symptomatic treatments with patient today including Zyrtec daily prn rhinitis and Tylenol prn for fever / pain. Schedule a follow-up with PCP in 2-3 days. Red flag symptoms were discussed with the patient today. The patient is directed to go the ED if symptoms change or worsen. Pt verbalizes understanding and is in agreement with plan of care. All questions answered. SIGNATURE: MARILY Gayle    *NOTE: This report was transcribed using voice recognition software. Every effort was made to ensure accuracy; however, inadvertent computerized transcription errors may be present.

## 2022-08-30 ENCOUNTER — TELEPHONE (OUTPATIENT)
Dept: SURGERY | Age: 47
End: 2022-08-30

## 2022-08-30 NOTE — TELEPHONE ENCOUNTER
I called Alf Campos. She missed her appointment yesterday, we got a hold of her and she said she wanted to reschedule. She has missed 6 scheduled colonoscopies since she started seeing me. This is an excessive amount. Most of my partners would only let her miss 3 and then dismissed her. I informed her that I have given her numerous chances and that I would really have wanted to get her colonoscopy done for her given her family history. But she is taking a spot up every time for something that really needs it as she only cancels the day before and we cannot fill the spot. We will be sending her official letter in the mail I let her know this and I felt like since I have known her for a while now that she deserves this phone call to know why this was occurring. I encouraged her to find another provider that can provide a colonoscopy for her.

## 2022-11-15 ENCOUNTER — TELEPHONE (OUTPATIENT)
Dept: FAMILY MEDICINE CLINIC | Age: 47
End: 2022-11-15

## 2022-11-15 DIAGNOSIS — G43.109 MIGRAINE WITH AURA AND WITHOUT STATUS MIGRAINOSUS, NOT INTRACTABLE: Chronic | ICD-10-CM

## 2022-11-15 NOTE — TELEPHONE ENCOUNTER
Patient notified she must be seen in order to receive a note. She can go to walk in or urgent care    ----- Message from Dewayne Cadet sent at 11/15/2022 12:20 PM EST -----  Subject: Message to Provider    QUESTIONS  Information for Provider? Pt is asking for a doctor's note for being off   today for a migraine. She is asking that it be emailed to her at   [email or web address]  ---------------------------------------------------------------------------  --------------  Tatiana Alvarez INFO  [phone]; OK to leave message on voicemail  ---------------------------------------------------------------------------  --------------  SCRIPT ANSWERS  Relationship to Patient?  Self

## 2022-11-15 NOTE — TELEPHONE ENCOUNTER
----- Message from Adriana Saldana sent at 11/15/2022 12:18 PM EST -----  Subject: Refill Request    QUESTIONS  Name of Medication? SUMAtriptan (IMITREX) 100 MG tablet  Patient-reported dosage and instructions? As needed  How many days do you have left? 1  Preferred Pharmacy? [facility] phone number (if available)? [phone]  ---------------------------------------------------------------------------  --------------,  Name of Medication? amitriptyline (ELAVIL) 150 MG tablet  Patient-reported dosage and instructions? Once daily before bed  How many days do you have left? 3  Preferred Pharmacy? [facility] [facility] phone number (if available)? [phone]  ---------------------------------------------------------------------------  --------------  CALL BACK INFO  What is the best way for the office to contact you? OK to leave message on   voicemail  Preferred Call Back Phone Number? [phone]  ---------------------------------------------------------------------------  --------------  SCRIPT ANSWERS  Relationship to Patient?  Self

## 2022-11-16 RX ORDER — AMITRIPTYLINE HYDROCHLORIDE 150 MG/1
TABLET, FILM COATED ORAL
Qty: 90 TABLET | Refills: 0 | Status: SHIPPED | OUTPATIENT
Start: 2022-11-16

## 2022-11-16 RX ORDER — SUMATRIPTAN 100 MG/1
TABLET, FILM COATED ORAL
Qty: 10 TABLET | Refills: 3 | Status: SHIPPED | OUTPATIENT
Start: 2022-11-16

## 2022-11-29 ENCOUNTER — OFFICE VISIT (OUTPATIENT)
Dept: FAMILY MEDICINE CLINIC | Age: 47
End: 2022-11-29
Payer: MEDICAID

## 2022-11-29 ENCOUNTER — HOSPITAL ENCOUNTER (EMERGENCY)
Age: 47
Discharge: HOME | End: 2022-11-29
Payer: MEDICAID

## 2022-11-29 VITALS
OXYGEN SATURATION: 97 % | TEMPERATURE: 98.3 F | SYSTOLIC BLOOD PRESSURE: 128 MMHG | DIASTOLIC BLOOD PRESSURE: 76 MMHG | HEART RATE: 96 BPM | RESPIRATION RATE: 24 BRPM

## 2022-11-29 VITALS
BODY MASS INDEX: 40.64 KG/M2 | HEART RATE: 97 BPM | DIASTOLIC BLOOD PRESSURE: 72 MMHG | TEMPERATURE: 97.4 F | RESPIRATION RATE: 16 BRPM | HEIGHT: 60 IN | OXYGEN SATURATION: 98 % | SYSTOLIC BLOOD PRESSURE: 112 MMHG | WEIGHT: 207 LBS

## 2022-11-29 DIAGNOSIS — L20.9 ATOPIC DERMATITIS, UNSPECIFIED TYPE: Primary | ICD-10-CM

## 2022-11-29 DIAGNOSIS — J01.00 ACUTE NON-RECURRENT MAXILLARY SINUSITIS: Primary | ICD-10-CM

## 2022-11-29 LAB
INFLUENZA A: NOT DETECTED
INFLUENZA B: NOT DETECTED
SARS-COV-2 RNA, RT PCR: NOT DETECTED

## 2022-11-29 PROCEDURE — 99213 OFFICE O/P EST LOW 20 MIN: CPT | Performed by: NURSE PRACTITIONER

## 2022-11-29 PROCEDURE — G8417 CALC BMI ABV UP PARAM F/U: HCPCS | Performed by: NURSE PRACTITIONER

## 2022-11-29 PROCEDURE — G8427 DOCREV CUR MEDS BY ELIG CLIN: HCPCS | Performed by: NURSE PRACTITIONER

## 2022-11-29 PROCEDURE — 1036F TOBACCO NON-USER: CPT | Performed by: NURSE PRACTITIONER

## 2022-11-29 PROCEDURE — G8484 FLU IMMUNIZE NO ADMIN: HCPCS | Performed by: NURSE PRACTITIONER

## 2022-11-29 PROCEDURE — 99283 EMERGENCY DEPT VISIT LOW MDM: CPT

## 2022-11-29 PROCEDURE — 87636 SARSCOV2 & INF A&B AMP PRB: CPT

## 2022-11-29 PROCEDURE — 6370000000 HC RX 637 (ALT 250 FOR IP): Performed by: NURSE PRACTITIONER

## 2022-11-29 RX ORDER — AMOXICILLIN AND CLAVULANATE POTASSIUM 875; 125 MG/1; MG/1
1 TABLET, FILM COATED ORAL 2 TIMES DAILY
Qty: 13 TABLET | Refills: 0 | Status: SHIPPED | OUTPATIENT
Start: 2022-11-29 | End: 2022-12-06

## 2022-11-29 RX ORDER — AMMONIUM LACTATE 12 G/100G
CREAM TOPICAL 2 TIMES DAILY PRN
Qty: 385 G | Refills: 0 | Status: SHIPPED | OUTPATIENT
Start: 2022-11-29

## 2022-11-29 RX ORDER — AMOXICILLIN AND CLAVULANATE POTASSIUM 875; 125 MG/1; MG/1
1 TABLET, FILM COATED ORAL ONCE
Status: COMPLETED | OUTPATIENT
Start: 2022-11-29 | End: 2022-11-29

## 2022-11-29 RX ADMIN — AMOXICILLIN AND CLAVULANATE POTASSIUM 1 TABLET: 875; 125 TABLET, FILM COATED ORAL at 16:41

## 2022-11-29 ASSESSMENT — PAIN DESCRIPTION - FREQUENCY: FREQUENCY: CONTINUOUS

## 2022-11-29 ASSESSMENT — LIFESTYLE VARIABLES
HOW MANY STANDARD DRINKS CONTAINING ALCOHOL DO YOU HAVE ON A TYPICAL DAY: PATIENT DOES NOT DRINK
HOW OFTEN DO YOU HAVE A DRINK CONTAINING ALCOHOL: NEVER

## 2022-11-29 ASSESSMENT — PAIN DESCRIPTION - ONSET: ONSET: ON-GOING

## 2022-11-29 ASSESSMENT — PAIN DESCRIPTION - PAIN TYPE: TYPE: ACUTE PAIN

## 2022-11-29 ASSESSMENT — PAIN DESCRIPTION - LOCATION: LOCATION: FACE

## 2022-11-29 ASSESSMENT — PAIN - FUNCTIONAL ASSESSMENT: PAIN_FUNCTIONAL_ASSESSMENT: 0-10

## 2022-11-29 ASSESSMENT — PAIN DESCRIPTION - DESCRIPTORS: DESCRIPTORS: PRESSURE

## 2022-11-29 ASSESSMENT — PAIN SCALES - GENERAL: PAINLEVEL_OUTOF10: 10

## 2022-11-29 NOTE — Clinical Note
Stevie Boateng was seen and treated in our emergency department on 11/29/2022. She may return to work on 12/01/2022. If you have any questions or concerns, please don't hesitate to call.       NANCY Tobias - CNP

## 2022-11-29 NOTE — ED PROVIDER NOTES
[facility]  Department of Emergency Medicine   ED  Encounter Note  Admit Date/RoomTime: 2022  4:17 PM  ED Room: [facility]    NAME: Andre Angulo  : 1975  MRN: [id number]     Chief Complaint:  URI (Sinus issues cough, drainage fever at times)    History of Present Illness       Andre Angulo is a 49 y.o. old female who presents to the emergency department by private vehicle, for fever, chills, and nasal congestion, which began 2 week(s) prior to arrival.  Since onset the symptoms have been remaining constant and moderate in severity. The symptoms are associated with dry cough and facial pain. There has been no abdominal pain, decreased appetite, chest tightness, conjunctivitis, watery eyes, productive cough, nausea, vomiting, diarrhea, dizziness, dysuria, urinary frequency, earache, ear pulling, fatigue, headache, hoarseness, irritability, joint swelling, malaise, muscle aches, neck stiffness, rash, sneezing, sore throat, scratchy throat, swollen glands, wheezing, loss of taste, loss of smell, or shortness of breath or chest pain. Patient states she has had sinus issues and has been using saline rinses, Flonase and allergy medications with no relief. .     ROS   Pertinent positives and negatives are stated within HPI, all other systems reviewed and are negative. Past Medical History:  has a past medical history of Degenerative joint disease of low back, Migraine, and Sinusitis (chronic). Surgical History:  has a past surgical history that includes Cholecystectomy (age 24); Tonsillectomy (age 1); [place] tooth extraction; Adenoidectomy; and Breast biopsy (approx ). Social History:  reports that she has never smoked. She has never used smokeless tobacco. She reports current alcohol use. She reports that she does not use drugs.     Family History: family history includes Arthritis in her mother; Asthma in her mother; Breast Cancer in her maternal aunt, maternal aunt, maternal aunt, maternal cousin, maternal cousin, maternal cousin, maternal cousin, maternal cousin, maternal grandmother, maternal great grandmother, paternal aunt, paternal aunt, and paternal great grandmother; Cancer in her father, maternal aunt, maternal aunt, maternal cousin, paternal grandfather, and paternal grandmother; Cancer (age of onset: 29) in her mother and sister; Heath Rumps in her maternal aunt and maternal aunt; Colon Cancer (age of onset: 27) in her mother; Heart Disease in her mother; High Blood Pressure in her father; Migraines in her father, mother, and sister; Prostate Cancer in her paternal uncle; Stroke in her mother and sister. Allergies: Latex, Iodine, Trazodone and nefazodone, Bactrim, Iodides, Aspirin, Tylenol with codeine [acetaminophen-codeine], Ultram [tramadol hcl], and Vicodin tuss [hydrocodone-guaifenesin]    Physical Exam   Oxygen Saturation Interpretation: Normal on room air analysis. ED Triage Vitals [11/29/22 1533]   BP Temp Temp Source Heart Rate Resp SpO2 Height Weight   128/76 98.3 °F (36.8 °C) Oral (!) 118 24 97 % -- --         Constitutional:  Alert, development consistent with age. Ears:  External Ears: Bilateral normal.               TM's & External Canals: normal TM's and external ear canals bilaterally. Nose:   There is clear rhinorrhea. Sinuses: moderate bilateral maxillary sinus tenderness. moderate bilateral frontal sinus tenderness. Mouth:  normal tongue and buccal mucosa. Throat: no erythema or exudates noted. Teeth and gums normal..  Airway patent. Neck:  Supple. No meningeal signs. There is no  preauricular, submental, parotid, anterior cervical, and posterior cervical node tenderness. Respiratory:   Breath sounds: bilateral normal.  Lung sounds: normal.   CV:  Regular rate and rhythm, normal heart sounds, without pathological murmurs, ectopy, gallops, or rubs.   GI:  Abdomen Soft, nontender, good bowel sounds. No firm or pulsatile mass. Integument:  Normal turgor. Warm, dry, without visible rash. Neurological:  Oriented. Motor functions intact. Lab / Imaging Results   (All laboratory and radiology results have been personally reviewed by myself)  Labs:  Results for orders placed or performed during the hospital encounter of 11/29/22   COVID-19 & Influenza Combo    Specimen: Nasopharyngeal Swab   Result Value Ref Range    SARS-CoV-2 RNA, RT PCR NOT DETECTED NOT DETECTED    INFLUENZA A NOT DETECTED NOT DETECTED    INFLUENZA B NOT DETECTED NOT DETECTED       Imaging: All Radiology results interpreted by Radiologist unless otherwise noted. No orders to display       ED Course / Medical Decision Making     Medications   amoxicillin-clavulanate (AUGMENTIN) 875-125 MG per tablet 1 tablet (1 tablet Oral Given 11/29/22 1641)        Re-examination:  11/29/22       Time:1647-reevaluated patient. Patient's heart rate has decreased. Patient discharged home with Augmentin. Educated on appropriate use and potential side effects of starting this medication. Patients condition is improving. Consults:   None    Procedures:   None    Medical Decision Making:   Patient presents to the ED for sinus pressure drainage nonproductive cough. Differential diagnoses included but not limited to sinusitis versus viral infection. Workup in the ED revealed influenza A B and COVID were unremarkable. Upon examination patient has sinus tenderness on the maxillary and frontal.  Due to patient's length of time and has already completed a rinses, Flonase and allergy medications she will be treated for acute bacterial  sinusitis with Augmentin. Patient continues to be non-toxic on re-evaluation. Findings were discussed with the patient and reasons to immediately return to the ED were articulated to them. They will follow-up with their PMD.      Assessment     1.  Acute non-recurrent maxillary sinusitis      Plan Discharged home. Patient condition is stable    New Medications     Discharge Medication List as of 11/29/2022  4:59 PM        START taking these medications    Details   amoxicillin-clavulanate (AUGMENTIN) 875-125 MG per tablet Take 1 tablet by mouth 2 times daily for 7 days, Disp-13 tablet, R-0Normal           Electronically signed by NANCY Lyman CNP   DD: 11/29/22  **This report was transcribed using voice recognition software. Every effort was made to ensure accuracy; however, inadvertent computerized transcription errors may be present.   END OF ED PROVIDER NOTE          NANCY Lyman CNP  11/29/22 7035

## 2022-11-29 NOTE — PROGRESS NOTES
22  Elfgeo Sun : 1975 Sex: female  Age 49 y.o. Subjective:  Chief Complaint   Patient presents with    Eczema     Flare up on hands and wrists. HPI:   Elfego Sun , 49 y.o. female presents to the clinic for evaluation of rash to hands x 2 weeks. The patient reports associated pruritus with hx of eczema. The patient has used Aquaphor for symptoms. The patient reports unchanged symptoms over time. Denies any known cause for the rash including any new soaps, detergents, lotions, foods, or medications. Denies recent travel, recent illness, and ill exposure. Denies any bleeding, drainage, lymphangitic streaking, arthralgia, myalgia,or lethargy. The patient also denies headache, fever, chest pain, abdominal pain, shortness of breath, and nausea / vomiting / diarrhea. ROS:   Unless otherwise stated in this report the patient's positive and negative responses for review of systems for constitutional, eyes, ENT, cardiovascular, respiratory, gastrointestinal, neurological, , musculoskeletal, and integument systems and related systems to the presenting problem are either stated in the history of present illness or were not pertinent or were negative for the symptoms and/or complaints related to the presenting medical problem. Positives and pertinent negatives as per HPI. All others reviewed and are negative.       PMH:     Past Medical History:   Diagnosis Date    Degenerative joint disease of low back 2021    Migraine 19 years    Sinusitis (chronic)        Past Surgical History:   Procedure Laterality Date    ADENOIDECTOMY      BREAST BIOPSY  approx     Patient not sure which site    CHOLECYSTECTOMY  age 24    TONSILLECTOMY  age 1    WISDOM TOOTH EXTRACTION         Family History   Problem Relation Age of Onset    Cancer Mother 29        breast, bilateral, more than once; uterine cancer    Heart Disease Mother     Asthma Mother     Stroke Mother     Migraines Mother Arthritis Mother     Colon Cancer Mother 27        rectal cancer    High Blood Pressure Father     Cancer Father         prostate    Migraines Father     Cancer Sister 29        breast and ovarian    Stroke Sister     Breast Cancer Maternal Grandmother     Cancer Paternal Grandmother         breast    Cancer Paternal Grandfather         prostate    Migraines Sister     Breast Cancer Maternal Aunt     Cancer Maternal Aunt         ovarian cancer    Colon Cancer Maternal Aunt     Breast Cancer Paternal Aunt     Prostate Cancer Paternal Uncle     Breast Cancer Maternal Cousin         under age of 48    Cancer Maternal Cousin         ovarian    Breast Cancer Maternal Aunt     Cancer Maternal Aunt         ovarian    Colon Cancer Maternal Aunt     Breast Cancer Maternal Aunt     Breast Cancer Paternal Great Grandmother     Breast Cancer Maternal Great Grandmother     Breast Cancer Paternal Aunt     Breast Cancer Maternal Cousin         under age of 48    Breast Cancer Maternal Cousin         under age of 48    Breast Cancer Maternal Cousin         under age of 48    Breast Cancer Maternal Cousin         under age of 48       Medications:     Current Outpatient Medications:     hydrocortisone 2.5 % cream, Apply topically 2 times daily. Do not use longer than 14 days. , Disp: 28 g, Rfl: 0    ammonium lactate (LAC-HYDRIN) 12 % cream, Apply topically 2 times daily as needed for Dry Skin Apply topically 2 times daily as needed. , Disp: 385 g, Rfl: 0    amitriptyline (ELAVIL) 150 MG tablet, TAKE ONE TABLET BY MOUTH NIGHTLY, Disp: 90 tablet, Rfl: 0    SUMAtriptan (IMITREX) 100 MG tablet, Take one tablet  prn headache. May repeat in 2 hours. No more than 200 mg in 24 hours. Do not treat greater than 4 headaches in 30 days. , Disp: 10 tablet, Rfl: 3    Cyanocobalamin (VITAMIN B12 PO), Take by mouth daily, Disp: , Rfl:     mineral oil-hydrophilic petrolatum (AQUAPHOR) ointment, APPLY TOPICALLY AS NEEDED, Disp: 52.5 g, Rfl: 3 NONFORMULARY, Take by mouth daily Gummie for hair skin and nails, Disp: , Rfl:     ibuprofen (IBU) 400 MG tablet, Take 1 tablet by mouth every 8 hours as needed for Pain Take with full glass of water, Disp: 12 tablet, Rfl: 0    Ascorbic Acid (VITAMIN C) 250 MG tablet, Take 500 mg by mouth daily Gummie, Disp: , Rfl:     Cholecalciferol (VITAMIN D3) 50 MCG (2000 UT) CAPS, Take 1 capsule by mouth daily Gummie, Disp: , Rfl:     MULTIPLE VITAMINS PO, Take 1 tablet by mouth daily Gummie, Disp: , Rfl:     melatonin 3 MG TABS tablet, Take 3 mg by mouth nightly as needed , Disp: , Rfl:     Allergies: Allergies   Allergen Reactions    Latex Anaphylaxis, Hives and Rash    Iodine Hives    Trazodone And Nefazodone Nausea And Vomiting    Bactrim Hives    Iodides Hives    Aspirin Hives and Nausea And Vomiting    Tylenol With Codeine [Acetaminophen-Codeine] Nausea And Vomiting    Ultram [Tramadol Hcl] Nausea And Vomiting    Vicodin Tuss [Hydrocodone-Guaifenesin] Nausea And Vomiting       Social History:     Social History     Tobacco Use    Smoking status: Never    Smokeless tobacco: Never   Vaping Use    Vaping Use: Never used   Substance Use Topics    Alcohol use: Yes     Alcohol/week: 0.0 standard drinks     Comment: rarely     Drug use: No       Physical Exam:     Vitals:    11/29/22 1032   BP: 112/72   Pulse: 97   Resp: 16   Temp: 97.4 °F (36.3 °C)   TempSrc: Temporal   SpO2: 98%   Weight: 207 lb (93.9 kg)   Height: 5' (1.524 m)       Physical Exam (PE)   Constitutional: Alert, development consistent with age. HENT:      Head: Normocephalic. Right Ear: External ear normal.      Left Ear: External ear normal.      Nose: Normal.      Mouth/Throat:     Mouth: Mucous membranes are moist.      Pharynx: Oropharynx is clear. Eyes: Pupils: Pupils are equal, round, and reactive to light. Neck: Normal ROM. Supple. Cardiovascular: Heart RRR without pathologic murmurs or gallops.    Pulmonary: Respiratory effort normal. Normal breath sounds. Abdomen: Soft, nontender, normal bowel sounds. Skin:  Normal turgor and appropriately dry to touch. Dry, erythematous skin noted over the posterior hand / wrist. Signs of excoriation. No TTP, vesicles, pustules, induration, or fluctuance. No bleeding or discharge noted. No lymphangitic streaking. Musculoskeletal: General: Normal strength / ROM. Neurological:  Orientation age-appropriate unless noted elsewhere. Motor functions intact. Psychiatric: Mood and Affect: Mood normal. Behavior: Behavior normal    Testing:   (All laboratory and radiology results have been personally reviewed by myself)  Labs:  No results found for this visit on 11/29/22. Imaging: All Radiology results interpreted by Radiologist unless otherwise noted. No orders to display       Assessment / Plan:   The patient's vitals, allergies, medications, and past medical history have been reviewed. Cornelia Fleischer was seen today for eczema. Diagnoses and all orders for this visit:    Atopic dermatitis, unspecified type  -     hydrocortisone 2.5 % cream; Apply topically 2 times daily. Do not use longer than 14 days. -     ammonium lactate (LAC-HYDRIN) 12 % cream; Apply topically 2 times daily as needed for Dry Skin Apply topically 2 times daily as needed. - Disposition: Home    - Educational material printed for patient's review and were included in patient instructions. After Visit Summary was given to patient at the end of visit. - Discussed symptomatic treatments with the patient today. The patient is to schedule a follow-up with PCP in the next 2-3 days for reevaluation. Red flag symptoms were also discussed with the patient today. If symptoms worsen the patient is to go directly to the emergency department for reevaluation and treatment. Pt verbalizes understanding and is in agreement with plan of care. All questions answered.     SIGNATURE: MARILY Monroe    *NOTE: This report was transcribed using voice recognition software. Every effort was made to ensure accuracy; however, inadvertent computerized transcription errors may be present.

## 2022-12-10 ENCOUNTER — HOSPITAL ENCOUNTER (EMERGENCY)
Age: 47
Discharge: HOME | End: 2022-12-10
Payer: MEDICAID

## 2022-12-10 VITALS
RESPIRATION RATE: 16 BRPM | BODY MASS INDEX: 40.84 KG/M2 | TEMPERATURE: 98.4 F | WEIGHT: 208 LBS | DIASTOLIC BLOOD PRESSURE: 79 MMHG | HEIGHT: 60 IN | HEART RATE: 86 BPM | OXYGEN SATURATION: 98 % | SYSTOLIC BLOOD PRESSURE: 129 MMHG

## 2022-12-10 DIAGNOSIS — J06.9 UPPER RESPIRATORY TRACT INFECTION, UNSPECIFIED TYPE: Primary | ICD-10-CM

## 2022-12-10 LAB
INFLUENZA A: NOT DETECTED
INFLUENZA B: NOT DETECTED
SARS-COV-2 RNA, RT PCR: NOT DETECTED

## 2022-12-10 PROCEDURE — 99283 EMERGENCY DEPT VISIT LOW MDM: CPT

## 2022-12-10 PROCEDURE — 87636 SARSCOV2 & INF A&B AMP PRB: CPT

## 2022-12-10 PROCEDURE — 6370000000 HC RX 637 (ALT 250 FOR IP)

## 2022-12-10 RX ORDER — LORATADINE 10 MG/1
10 TABLET ORAL DAILY
Qty: 20 TABLET | Refills: 0 | Status: SHIPPED | OUTPATIENT
Start: 2022-12-10

## 2022-12-10 RX ORDER — METHYLPREDNISOLONE 4 MG/1
TABLET ORAL
Qty: 21 TABLET | Refills: 0 | Status: SHIPPED | OUTPATIENT
Start: 2022-12-10 | End: 2022-12-16

## 2022-12-10 RX ORDER — IBUPROFEN 600 MG/1
600 TABLET ORAL ONCE
Status: COMPLETED | OUTPATIENT
Start: 2022-12-10 | End: 2022-12-10

## 2022-12-10 RX ADMIN — IBUPROFEN 600 MG: 600 TABLET ORAL at 13:34

## 2022-12-10 ASSESSMENT — PAIN SCALES - GENERAL
PAINLEVEL_OUTOF10: 10
PAINLEVEL_OUTOF10: 10

## 2022-12-10 ASSESSMENT — PAIN - FUNCTIONAL ASSESSMENT: PAIN_FUNCTIONAL_ASSESSMENT: 0-10

## 2022-12-10 NOTE — DISCHARGE INSTRUCTIONS
Take all medications as ordered. Take over the counter ibuprofen as needed for symptom relief. Follow up with your primary care doctor. Return to the emergency room if symptoms worsen or having difficulty breathing.

## 2022-12-10 NOTE — Clinical Note
Lefty Ramirez was seen and treated in our emergency department on 12/10/2022. She may return to work on 12/11/2022. If you have any questions or concerns, please don't hesitate to call.       Angelina Pickett, APRN - CNP

## 2022-12-10 NOTE — ED PROVIDER NOTES
[facility]  Department of Emergency Medicine   ED  Encounter Note  Admit Date/RoomTime: 12/10/2022 12:13 PM  ED Room:     NAME: Arvind Subramanian  : 1975  MRN: [id number]     Chief Complaint:  URI (Swollen sinuses, \"dried out\" cough. \"On and off fever\". Fatigue.)    History of Present Illness       Arvind Subramanian is a 49 y.o. old female who presents to the emergency department by private vehicle, for on and off low grade fever, nasal congestion with green drainage, dry cough, fatigue, and facial pain, which began last week. Since onset the symptoms have been remaining constant and mild-moderate in severity. Patient denies chest pain and shortness of breath. Patient came to the Emergency Department on 2022 for fever and nasal congestion and was prescribed Augmentin. Patient states that she did finish her entire course of antibiotics as ordered. ROS   Pertinent positives and negatives are stated within HPI, all other systems reviewed and are negative. Past Medical History:  has a past medical history of Degenerative joint disease of low back, Migraine, and Sinusitis (chronic). Surgical History:  has a past surgical history that includes Cholecystectomy (age 24); Tonsillectomy (age 1); [place] tooth extraction; Adenoidectomy; and Breast biopsy (approx ). Social History:  reports that she has never smoked. She has never used smokeless tobacco. She reports current alcohol use. She reports that she does not use drugs.     Family History: family history includes Arthritis in her mother; Asthma in her mother; Breast Cancer in her maternal aunt, maternal aunt, maternal aunt, maternal cousin, maternal cousin, maternal cousin, maternal cousin, maternal cousin, maternal grandmother, maternal great grandmother, paternal aunt, paternal aunt, and paternal great grandmother; Cancer in her father, maternal aunt, maternal aunt, maternal cousin, paternal grandfather, and paternal grandmother; Cancer (age of onset: 29) in her mother and sister; Colon Cancer in her maternal aunt and maternal aunt; Colon Cancer (age of onset: 27) in her mother; Heart Disease in her mother; High Blood Pressure in her father; Migraines in her father, mother, and sister; Prostate Cancer in her paternal uncle; Stroke in her mother and sister. Allergies: Latex, Iodine, Trazodone and nefazodone, Bactrim, Codeine, Iodides, Aspirin, Tylenol with codeine [acetaminophen-codeine], Ultram [tramadol hcl], and Vicodin tuss [hydrocodone-guaifenesin]    Physical Exam   Oxygen Saturation Interpretation: Normal on room air analysis. ED Triage Vitals   BP Temp Temp Source Heart Rate Resp SpO2 Height Weight   12/10/22 1136 12/10/22 1136 12/10/22 1136 12/10/22 1136 12/10/22 1136 12/10/22 1136 12/10/22 1145 12/10/22 1145   129/79 98.4 °F (36.9 °C) Oral 86 16 98 % 5' (1.524 m) 208 lb (94.3 kg)         Constitutional:  Alert, development consistent with age. Ears:  External Ears: Bilateral normal.               TM's & External Canals: normal TM's and external ear canals bilaterally. Nose:   There is no discharge, swelling or lesions noted. Sinuses: moderate bilateral maxillary sinus tenderness. moderate bilateral frontal sinus tenderness. Mouth:  normal tongue and buccal mucosa. Throat: no erythema or exudates noted. Teeth and gums normal..  Airway patent. Neck:  Supple. No meningeal signs. There is no  preauricular, submental, parotid, anterior cervical, posterior cervical, supraclavicular, and epitrochlear node tenderness. Respiratory:   Breath sounds: bilateral normal.  Lung sounds: normal.   CV:  Regular rate and rhythm, normal heart sounds, without pathological murmurs, ectopy, gallops, or rubs. GI:  Abdomen Soft, nontender, good bowel sounds. No firm or pulsatile mass. Integument:  Normal turgor. Warm, dry, without visible rash.   Neurological: Oriented. Motor functions intact. Lab / Imaging Results   (All laboratory and radiology results have been personally reviewed by myself)  Labs:  Results for orders placed or performed during the hospital encounter of 12/10/22   COVID-19 & Influenza Combo    Specimen: Nasopharyngeal Swab   Result Value Ref Range    SARS-CoV-2 RNA, RT PCR NOT DETECTED NOT DETECTED    INFLUENZA A NOT DETECTED NOT DETECTED    INFLUENZA B NOT DETECTED NOT DETECTED       Imaging: All Radiology results interpreted by Radiologist unless otherwise noted. No orders to display       ED Course / Medical Decision Making     Medications   ibuprofen (ADVIL;MOTRIN) tablet 600 mg (600 mg Oral Given 12/10/22 1334)          Medical Decision Making:   Patient presents to the emergency department by private vehicle, for on and off low grade fever, nasal congestion with green drainage, dry cough, fatigue, and facial pain, which began last week. Since onset the symptoms have been remaining constant and mild-moderate in severity. Patient denies chest pain and shortness of breath. Patient came to the Emergency Department on November 29, 2022 for fever and nasal congestion and was prescribed Augmentin. Patient states that she did finish her entire course of antibiotics as ordered and is still having symptoms. Patient does have tenderness to bilateral frontal and bilateral maxillary sinuses on palpation. Lungs clear to auscultation in all lobes. Rapid covid and flu resulted negative. Results  discussed with patient. Patient given Ibuprofen for sinus pain. Patient discharged with a prescription for a medrol dose shell and claritin by mouth. Instructed to follow up with PCP. Discharge plan discussed with patient. Patient verbalizes understanding of discharge plan and has no questions at this time. Assessment     1. Upper respiratory tract infection, unspecified type      Plan   Discharged home.   Patient condition is good    New Medications     New Prescriptions    LORATADINE (CLARITIN) 10 MG TABLET    Take 1 tablet by mouth daily    METHYLPREDNISOLONE (MEDROL, WEN,) 4 MG TABLET    Take as directed on package insert days 1-6     Electronically signed by NANCY Bills CNP   DD: 12/10/22  **This report was transcribed using voice recognition software. Every effort was made to ensure accuracy; however, inadvertent computerized transcription errors may be present.   END OF ED PROVIDER NOTE        NANCY Espinoza CNP  12/10/22 8971

## 2023-01-05 ENCOUNTER — OFFICE VISIT (OUTPATIENT)
Dept: OBGYN | Age: 48
End: 2023-01-05
Payer: MEDICAID

## 2023-01-05 VITALS
SYSTOLIC BLOOD PRESSURE: 123 MMHG | WEIGHT: 209 LBS | DIASTOLIC BLOOD PRESSURE: 79 MMHG | HEART RATE: 91 BPM | BODY MASS INDEX: 40.82 KG/M2

## 2023-01-05 DIAGNOSIS — R23.2 HOT FLASHES: ICD-10-CM

## 2023-01-05 DIAGNOSIS — N93.9 ABNORMAL UTERINE BLEEDING: Primary | ICD-10-CM

## 2023-01-05 PROCEDURE — 1036F TOBACCO NON-USER: CPT | Performed by: OBSTETRICS & GYNECOLOGY

## 2023-01-05 PROCEDURE — 99213 OFFICE O/P EST LOW 20 MIN: CPT | Performed by: OBSTETRICS & GYNECOLOGY

## 2023-01-05 PROCEDURE — G8427 DOCREV CUR MEDS BY ELIG CLIN: HCPCS | Performed by: OBSTETRICS & GYNECOLOGY

## 2023-01-05 PROCEDURE — 99212 OFFICE O/P EST SF 10 MIN: CPT | Performed by: OBSTETRICS & GYNECOLOGY

## 2023-01-05 PROCEDURE — G8484 FLU IMMUNIZE NO ADMIN: HCPCS | Performed by: OBSTETRICS & GYNECOLOGY

## 2023-01-05 PROCEDURE — G8417 CALC BMI ABV UP PARAM F/U: HCPCS | Performed by: OBSTETRICS & GYNECOLOGY

## 2023-01-05 RX ORDER — PAROXETINE 10 MG/1
10 TABLET, FILM COATED ORAL DAILY
Qty: 30 TABLET | Refills: 3 | Status: SHIPPED | OUTPATIENT
Start: 2023-01-05

## 2023-01-05 NOTE — PROGRESS NOTES
Kitty Louise     Patient presents for menopausal symptoms. Patient states she has been having hot flashes and sweating episodes. States she is smelling more. She is also more irritable. Patient states she has irregular periods that are heavy. They can come monthly but never at the same date. Patient does have migraines with aura. Will avoid estrogen at this time.      Past Medical History:   Diagnosis Date    Degenerative joint disease of low back 2/11/2021    Migraine 20 years    Sinusitis (chronic)         Past Surgical History:   Procedure Laterality Date    ADENOIDECTOMY      BREAST BIOPSY  approx 2020    Patient not sure which site    CHOLECYSTECTOMY  age 24    TONSILLECTOMY  age 1    WISDOM TOOTH EXTRACTION          Family History   Problem Relation Age of Onset    Cancer Mother 29        breast, bilateral, more than once; uterine cancer    Heart Disease Mother     Asthma Mother     Stroke Mother     Migraines Mother     Arthritis Mother     Colon Cancer Mother 27        rectal cancer    High Blood Pressure Father     Cancer Father         prostate    Migraines Father     Cancer Sister 29        breast and ovarian    Stroke Sister     Breast Cancer Maternal Grandmother     Cancer Paternal Grandmother         breast    Cancer Paternal Grandfather         prostate    Migraines Sister     Breast Cancer Maternal Aunt     Cancer Maternal Aunt         ovarian cancer    Colon Cancer Maternal Aunt     Breast Cancer Paternal Aunt     Prostate Cancer Paternal Uncle     Breast Cancer Maternal Cousin         under age of 48    Cancer Maternal Cousin         ovarian    Breast Cancer Maternal Aunt     Cancer Maternal Aunt         ovarian    Colon Cancer Maternal Aunt     Breast Cancer Maternal Aunt     Breast Cancer Paternal Great Grandmother     Breast Cancer Maternal Great Grandmother     Breast Cancer Paternal Aunt     Breast Cancer Maternal Cousin         under age of 48    Breast Cancer Maternal Cousin under age of 48    Breast Cancer Maternal Cousin         under age of 48    Breast Cancer Maternal Cousin         under age of 48        Social History       Tobacco History       Smoking Status  Never      Smokeless Tobacco Use  Never              Alcohol History       Alcohol Use Status  Yes Drinks/Week  0 Standard drinks or equivalent per week Amount  0.0 standard drinks of alcohol/wk Comment  rarely               Drug Use       Drug Use Status  No              Sexual Activity       Sexually Active  Not Currently Partners  Male                      Current Outpatient Medications:     Drospirenone 4 MG TABS, Take 1 tablet by mouth daily, Disp: 28 tablet, Rfl: 30    PARoxetine (PAXIL) 10 MG tablet, Take 1 tablet by mouth daily, Disp: 30 tablet, Rfl: 3    loratadine (CLARITIN) 10 MG tablet, Take 1 tablet by mouth daily, Disp: 20 tablet, Rfl: 0    hydrocortisone 2.5 % cream, Apply topically 2 times daily. Do not use longer than 14 days. , Disp: 28 g, Rfl: 0    ammonium lactate (LAC-HYDRIN) 12 % cream, Apply topically 2 times daily as needed for Dry Skin Apply topically 2 times daily as needed. , Disp: 385 g, Rfl: 0    amitriptyline (ELAVIL) 150 MG tablet, TAKE ONE TABLET BY MOUTH NIGHTLY, Disp: 90 tablet, Rfl: 0    SUMAtriptan (IMITREX) 100 MG tablet, Take one tablet  prn headache. May repeat in 2 hours. No more than 200 mg in 24 hours. Do not treat greater than 4 headaches in 30 days. , Disp: 10 tablet, Rfl: 3    Cyanocobalamin (VITAMIN B12 PO), Take by mouth daily, Disp: , Rfl:     mineral oil-hydrophilic petrolatum (AQUAPHOR) ointment, APPLY TOPICALLY AS NEEDED, Disp: 52.5 g, Rfl: 3    NONFORMULARY, Take by mouth daily Gummie for hair skin and nails, Disp: , Rfl:     ibuprofen (IBU) 400 MG tablet, Take 1 tablet by mouth every 8 hours as needed for Pain Take with full glass of water, Disp: 12 tablet, Rfl: 0    Ascorbic Acid (VITAMIN C) 250 MG tablet, Take 500 mg by mouth daily Gummie, Disp: , Rfl: Cholecalciferol (VITAMIN D3) 50 MCG (2000 UT) CAPS, Take 1 capsule by mouth daily Gummie, Disp: , Rfl:     MULTIPLE VITAMINS PO, Take 1 tablet by mouth daily Gummie, Disp: , Rfl:     melatonin 3 MG TABS tablet, Take 3 mg by mouth nightly as needed , Disp: , Rfl:      Allergies   Allergen Reactions    Latex Anaphylaxis, Hives and Rash    Iodine Hives    Trazodone And Nefazodone Nausea And Vomiting    Bactrim Hives    Codeine     Iodides Hives    Aspirin Hives and Nausea And Vomiting    Tylenol With Codeine [Acetaminophen-Codeine] Nausea And Vomiting    Ultram [Tramadol Hcl] Nausea And Vomiting    Vicodin Tuss [Hydrocodone-Guaifenesin] Nausea And Vomiting        Vitals:    01/05/23 1001   BP: 123/79   Pulse: 91        Physical Exam:  General: pleasant, alert     Breasts: deferred     Pelvic exam: deferred     Floy [place] was seen today for other. Diagnoses and all orders for this visit:    Abnormal uterine bleeding  -     Drospirenone 4 MG TABS; Take 1 tablet by mouth daily    Hot flashes  -     PARoxetine (PAXIL) 10 MG tablet; Take 1 tablet by mouth daily    Risks of medications reviewed. Return in about 1 month (around 2/5/2023) for Follow-up.      April Dunn MD

## 2023-01-05 NOTE — PROGRESS NOTES
Patient alert and pleasant with concerns about hot flashes. Patient states she has body odor with theses hot flashes   Discharge instructions have been discussed with the patient. Patient advised to call our office with any questions or concerns. Voiced understanding.

## 2023-01-12 ENCOUNTER — TELEPHONE (OUTPATIENT)
Dept: OBGYN | Age: 48
End: 2023-01-12

## 2023-01-12 NOTE — TELEPHONE ENCOUNTER
Patient called in stating that the order for Drospirenone was not filled by [facility] Called the pharmacy and it was cancelled d/t the refill being 30. Please resend with correct number of refills.

## 2023-01-17 DIAGNOSIS — N93.9 ABNORMAL UTERINE BLEEDING: ICD-10-CM

## 2023-01-27 ENCOUNTER — TELEPHONE (OUTPATIENT)
Dept: OBGYN | Age: 48
End: 2023-01-27

## 2023-02-03 ENCOUNTER — OFFICE VISIT (OUTPATIENT)
Dept: FAMILY MEDICINE CLINIC | Age: 48
End: 2023-02-03
Payer: MEDICAID

## 2023-02-03 VITALS
DIASTOLIC BLOOD PRESSURE: 62 MMHG | RESPIRATION RATE: 16 BRPM | HEIGHT: 60 IN | HEART RATE: 88 BPM | OXYGEN SATURATION: 97 % | WEIGHT: 211 LBS | TEMPERATURE: 97.8 F | SYSTOLIC BLOOD PRESSURE: 108 MMHG | BODY MASS INDEX: 41.43 KG/M2

## 2023-02-03 DIAGNOSIS — G43.109 MIGRAINE WITH AURA AND WITHOUT STATUS MIGRAINOSUS, NOT INTRACTABLE: Chronic | ICD-10-CM

## 2023-02-03 DIAGNOSIS — G43.109 MIGRAINE WITH AURA AND WITHOUT STATUS MIGRAINOSUS, NOT INTRACTABLE: Primary | Chronic | ICD-10-CM

## 2023-02-03 DIAGNOSIS — Z12.11 COLON CANCER SCREENING: ICD-10-CM

## 2023-02-03 DIAGNOSIS — R43.2 TASTE SENSE ALTERED: ICD-10-CM

## 2023-02-03 PROCEDURE — G8417 CALC BMI ABV UP PARAM F/U: HCPCS | Performed by: INTERNAL MEDICINE

## 2023-02-03 PROCEDURE — 1036F TOBACCO NON-USER: CPT | Performed by: INTERNAL MEDICINE

## 2023-02-03 PROCEDURE — 99213 OFFICE O/P EST LOW 20 MIN: CPT | Performed by: INTERNAL MEDICINE

## 2023-02-03 PROCEDURE — G8484 FLU IMMUNIZE NO ADMIN: HCPCS | Performed by: INTERNAL MEDICINE

## 2023-02-03 PROCEDURE — G8427 DOCREV CUR MEDS BY ELIG CLIN: HCPCS | Performed by: INTERNAL MEDICINE

## 2023-02-03 RX ORDER — SUMATRIPTAN 100 MG/1
TABLET, FILM COATED ORAL
Qty: 10 TABLET | Refills: 3 | Status: CANCELLED | OUTPATIENT
Start: 2023-02-03

## 2023-02-03 RX ORDER — AMITRIPTYLINE HYDROCHLORIDE 150 MG/1
TABLET, FILM COATED ORAL
Qty: 90 TABLET | Refills: 1 | Status: SHIPPED | OUTPATIENT
Start: 2023-02-03

## 2023-02-03 RX ORDER — SUMATRIPTAN 100 MG/1
TABLET, FILM COATED ORAL
Qty: 10 TABLET | Refills: 3 | Status: SHIPPED | OUTPATIENT
Start: 2023-02-03

## 2023-02-03 RX ORDER — AMITRIPTYLINE HYDROCHLORIDE 150 MG/1
TABLET, FILM COATED ORAL
Qty: 90 TABLET | Refills: 0 | Status: CANCELLED | OUTPATIENT
Start: 2023-02-03

## 2023-02-03 SDOH — ECONOMIC STABILITY: INCOME INSECURITY: HOW HARD IS IT FOR YOU TO PAY FOR THE VERY BASICS LIKE FOOD, HOUSING, MEDICAL CARE, AND HEATING?: HARD

## 2023-02-03 SDOH — ECONOMIC STABILITY: FOOD INSECURITY: WITHIN THE PAST 12 MONTHS, THE FOOD YOU BOUGHT JUST DIDN'T LAST AND YOU DIDN'T HAVE MONEY TO GET MORE.: NEVER TRUE

## 2023-02-03 SDOH — ECONOMIC STABILITY: FOOD INSECURITY: WITHIN THE PAST 12 MONTHS, YOU WORRIED THAT YOUR FOOD WOULD RUN OUT BEFORE YOU GOT MONEY TO BUY MORE.: NEVER TRUE

## 2023-02-03 SDOH — ECONOMIC STABILITY: HOUSING INSECURITY
IN THE LAST 12 MONTHS, WAS THERE A TIME WHEN YOU DID NOT HAVE A STEADY PLACE TO SLEEP OR SLEPT IN A SHELTER (INCLUDING NOW)?: NO

## 2023-02-03 ASSESSMENT — PATIENT HEALTH QUESTIONNAIRE - PHQ9
1. LITTLE INTEREST OR PLEASURE IN DOING THINGS: 0
SUM OF ALL RESPONSES TO PHQ9 QUESTIONS 1 & 2: 0
SUM OF ALL RESPONSES TO PHQ QUESTIONS 1-9: 0
SUM OF ALL RESPONSES TO PHQ QUESTIONS 1-9: 0
2. FEELING DOWN, DEPRESSED OR HOPELESS: 0
SUM OF ALL RESPONSES TO PHQ QUESTIONS 1-9: 0
SUM OF ALL RESPONSES TO PHQ QUESTIONS 1-9: 0

## 2023-02-03 NOTE — PROGRESS NOTES
[facility] PRIMARY CARE  [address] [address]  Dept: [phone]  Dept Fax: [phone]     NAME: Pilar Sun        :  1975        MRN:  [id number]    Chief Complaint   Patient presents with    Medication Refill    Taste Change     Some things taste stale to her. Migraine       Subjective     History of Present Illness  Pilar Sun is a 49 y.o. female who presents today for an acute visit with the complaint as above. Needing refills on her amitriptyline and sumatriptan     Review of Systems  Please see HPI/CC above. Comprehensive review of systems negative unless otherwise noted above. Home Medications:  Current Outpatient Medications   Medication Sig Dispense Refill    amitriptyline (ELAVIL) 150 MG tablet TAKE ONE TABLET BY MOUTH NIGHTLY 90 tablet 1    SUMAtriptan (IMITREX) 100 MG tablet Take one tablet  prn headache. May repeat in 2 hours. No more than 200 mg in 24 hours. Do not treat greater than 4 headaches in 30 days. 10 tablet 3    Drospirenone 4 MG TABS Take 1 tablet by mouth daily 28 tablet 11    PARoxetine (PAXIL) 10 MG tablet Take 1 tablet by mouth daily 30 tablet 3    loratadine (CLARITIN) 10 MG tablet Take 1 tablet by mouth daily 20 tablet 0    hydrocortisone 2.5 % cream Apply topically 2 times daily. Do not use longer than 14 days. 28 g 0    ammonium lactate (LAC-HYDRIN) 12 % cream Apply topically 2 times daily as needed for Dry Skin Apply topically 2 times daily as needed.  385 g 0    Cyanocobalamin (VITAMIN B12 PO) Take by mouth daily      mineral oil-hydrophilic petrolatum (AQUAPHOR) ointment APPLY TOPICALLY AS NEEDED 52.5 g 3    NONFORMULARY Take by mouth daily Gummie for hair skin and nails      ibuprofen (IBU) 400 MG tablet Take 1 tablet by mouth every 8 hours as needed for Pain Take with full glass of water 12 tablet 0    Ascorbic Acid (VITAMIN C) 250 MG tablet Take 500 mg by mouth daily Gummie Cholecalciferol (VITAMIN D3) 50 MCG (2000 UT) CAPS Take 1 capsule by mouth daily Gummie      MULTIPLE VITAMINS PO Take 1 tablet by mouth daily Gummie      melatonin 3 MG TABS tablet Take 3 mg by mouth nightly as needed        No current facility-administered medications for this visit. Allergies: Allergies   Allergen Reactions    Latex Anaphylaxis, Hives and Rash    Iodine Hives    Trazodone And Nefazodone Nausea And Vomiting    Bactrim Hives    Codeine     Iodides Hives    Aspirin Hives and Nausea And Vomiting    Tylenol With Codeine [Acetaminophen-Codeine] Nausea And Vomiting    Ultram [Tramadol Hcl] Nausea And Vomiting    Vicodin Tuss [Hydrocodone-Guaifenesin] Nausea And Vomiting       Objective     Vitals:    02/03/23 0834   BP: 108/62   Pulse: 88   Resp: 16   Temp: 97.8 °F (36.6 °C)   TempSrc: Temporal   SpO2: 97%   Weight: 211 lb (95.7 kg)   Height: 5' (1.524 m)        Physical Exam  General: Awake, alert, and oriented to person, place, time, and purpose, appears stated age and cooperative, No acute distress  Head: Normocephalic, atraumatic  Eyes: conjunctivae/corneas clear, EOMI  Mouth: Mucous membranes moist with no pharyngeal exudate or erythema  Ears: Bilateral TMs without erythema, bulging, or perforation  Neck: symmetrical, trachea midline  Back: symmetric, ROM normal, No CVA tenderness. Lungs: clear to auscultation bilaterally without wheezes, rales, or rhonchi  Heart: regular rate and rhythm, S1, S2 normal, no murmur, click, rub or gallop  Abdomen: soft, non-tender; bowel sounds normal; no masses,  no organomegaly  Extremities: atraumatic, no cyanosis, no edema  Skin: color, texture, turgor within normal limits  Neurologic:speech appropriate, moves all 4 extremities, normal muscle strength and tone, CN 2-12 grossly intact      Assessment and Plan     Patient is a 49 y.o. female who presented to the office for an acute visit.      Eddie Jensen was seen today for medication refill, taste change and migraine. Diagnoses and all orders for this visit:    Migraine with aura and without status migrainosus, not intractable  -     amitriptyline (ELAVIL) 150 MG tablet; TAKE ONE TABLET BY MOUTH NIGHTLY  -     SUMAtriptan (IMITREX) 100 MG tablet; Take one tablet  prn headache. May repeat in 2 hours. No more than 200 mg in 24 hours. Do not treat greater than 4 headaches in 30 days. - stable, continue medication as above    Taste sense altered  - patient has chronic sinusitis issues and does think her sinuses are currently inflamed, this is likely causing alterations in her taste  - unlikely related to menopause    Colon cancer screening  -     Fecal DNA Colorectal cancer screening (Cologuard)      Educational materials and/or home exercises printed for patient's review and were included in patient instructions on his/her After Visit Summary and given to patient at the end of visit. Counseled regarding above diagnosis, including possible risks and complications, especially if left uncontrolled or untreated. Advised to present to the Emergency Department if symptoms worsen. Counseled regarding the possible side effects, risks, benefits and alternatives to treatment; patient and/or guardian verbalizes understanding, agrees, feels comfortable with and wishes to proceed with above treatment plan. Advised patient to call [facility] new medication issues, and read all Rx info from pharmacy to assure aware of all possible risks and side effects of any medication before taking. Patient verbalizes understanding and agrees with above counseling, assessment and plan. All questions answered.     Christian Salas,

## 2023-02-16 ENCOUNTER — OFFICE VISIT (OUTPATIENT)
Dept: OBGYN | Age: 48
End: 2023-02-16
Payer: MEDICAID

## 2023-02-16 VITALS — BODY MASS INDEX: 40.84 KG/M2 | WEIGHT: 208 LBS | HEIGHT: 60 IN

## 2023-02-16 DIAGNOSIS — Z12.31 ENCOUNTER FOR SCREENING MAMMOGRAM FOR BREAST CANCER: ICD-10-CM

## 2023-02-16 DIAGNOSIS — Z91.89 INCREASED RISK OF BREAST CANCER: ICD-10-CM

## 2023-02-16 DIAGNOSIS — R23.2 HOT FLASHES: ICD-10-CM

## 2023-02-16 DIAGNOSIS — N93.9 ABNORMAL UTERINE BLEEDING: Primary | ICD-10-CM

## 2023-02-16 PROCEDURE — 99213 OFFICE O/P EST LOW 20 MIN: CPT | Performed by: OBSTETRICS & GYNECOLOGY

## 2023-02-16 PROCEDURE — G8484 FLU IMMUNIZE NO ADMIN: HCPCS | Performed by: OBSTETRICS & GYNECOLOGY

## 2023-02-16 PROCEDURE — G8417 CALC BMI ABV UP PARAM F/U: HCPCS | Performed by: OBSTETRICS & GYNECOLOGY

## 2023-02-16 PROCEDURE — 99212 OFFICE O/P EST SF 10 MIN: CPT | Performed by: OBSTETRICS & GYNECOLOGY

## 2023-02-16 PROCEDURE — G8427 DOCREV CUR MEDS BY ELIG CLIN: HCPCS | Performed by: OBSTETRICS & GYNECOLOGY

## 2023-02-16 PROCEDURE — 1036F TOBACCO NON-USER: CPT | Performed by: OBSTETRICS & GYNECOLOGY

## 2023-02-16 RX ORDER — PAROXETINE 10 MG/1
10 TABLET, FILM COATED ORAL DAILY
Qty: 30 TABLET | Refills: 11 | Status: SHIPPED | OUTPATIENT
Start: 2023-02-16

## 2023-02-16 NOTE — PROGRESS NOTES
Mitchell Varela     Patient presents for follow up. Patient was seen in January with complaints of abnormal uterine bleeding, as well has hot flashes. She was started on slynd for cycle control and paxil for hot flashes. Patient states both medications are working well. She had one cycle since starting the slynd and it was not as heavy. Her hot flashes have improved as well. Patient had a mammogram in 2/2022 that had an elevated Brigida Jumbo. She was counseled on increase risk of breast cancer. It was recommended to get a breast MRI in July but patient did not. Will order mammogram for this month, encouraged patient to get the breast MRI in 6 months.      Past Medical History:   Diagnosis Date    Degenerative joint disease of low back 2/11/2021    Migraine 20 years    Sinusitis (chronic)         Past Surgical History:   Procedure Laterality Date    ADENOIDECTOMY      BREAST BIOPSY  approx 2020    Patient not sure which site    CHOLECYSTECTOMY  age 24    TONSILLECTOMY  age 1    WISDOM TOOTH EXTRACTION          Family History   Problem Relation Age of Onset    Cancer Mother 29        breast, bilateral, more than once; uterine cancer    Heart Disease Mother     Asthma Mother     Stroke Mother     Migraines Mother     Arthritis Mother     Colon Cancer Mother 27        rectal cancer    High Blood Pressure Father     Cancer Father         prostate    Migraines Father     Cancer Sister 29        breast and ovarian    Stroke Sister     Breast Cancer Maternal Grandmother     Cancer Paternal Grandmother         breast    Cancer Paternal Grandfather         prostate    Migraines Sister     Breast Cancer Maternal Aunt     Cancer Maternal Aunt         ovarian cancer    Colon Cancer Maternal Aunt     Breast Cancer Paternal Aunt     Prostate Cancer Paternal Uncle     Breast Cancer Maternal Cousin         under age of 48    Cancer Maternal Cousin         ovarian    Breast Cancer Maternal Aunt     Cancer Maternal Aunt ovarian    Colon Cancer Maternal Aunt     Breast Cancer Maternal Aunt     Breast Cancer Paternal Great Grandmother     Breast Cancer Maternal Great Grandmother     Breast Cancer Paternal Aunt     Breast Cancer Maternal Cousin         under age of 48    Breast Cancer Maternal Cousin         under age of 48    Breast Cancer Maternal Cousin         under age of 48    Breast Cancer Maternal Cousin         under age of 48        Social History       Tobacco History       Smoking Status  Never      Smokeless Tobacco Use  Never              Alcohol History       Alcohol Use Status  Yes Drinks/Week  0 Standard drinks or equivalent per week Amount  0.0 standard drinks of alcohol/wk Comment  rarely               Drug Use       Drug Use Status  No              Sexual Activity       Sexually Active  Not Currently Partners  Male                      Current Outpatient Medications:     Drospirenone 4 MG TABS, Take 1 tablet by mouth daily, Disp: 28 tablet, Rfl: 11    PARoxetine (PAXIL) 10 MG tablet, Take 1 tablet by mouth daily, Disp: 30 tablet, Rfl: 11    amitriptyline (ELAVIL) 150 MG tablet, TAKE ONE TABLET BY MOUTH NIGHTLY, Disp: 90 tablet, Rfl: 1    SUMAtriptan (IMITREX) 100 MG tablet, Take one tablet  prn headache. May repeat in 2 hours. No more than 200 mg in 24 hours. Do not treat greater than 4 headaches in 30 days. , Disp: 10 tablet, Rfl: 3    loratadine (CLARITIN) 10 MG tablet, Take 1 tablet by mouth daily, Disp: 20 tablet, Rfl: 0    hydrocortisone 2.5 % cream, Apply topically 2 times daily. Do not use longer than 14 days. , Disp: 28 g, Rfl: 0    ammonium lactate (LAC-HYDRIN) 12 % cream, Apply topically 2 times daily as needed for Dry Skin Apply topically 2 times daily as needed. , Disp: 385 g, Rfl: 0    Cyanocobalamin (VITAMIN B12 PO), Take by mouth daily, Disp: , Rfl:     mineral oil-hydrophilic petrolatum (AQUAPHOR) ointment, APPLY TOPICALLY AS NEEDED, Disp: 52.5 g, Rfl: 3    NONFORMULARY, Take by mouth daily Gummie for hair skin and nails, Disp: , Rfl:     ibuprofen (IBU) 400 MG tablet, Take 1 tablet by mouth every 8 hours as needed for Pain Take with full glass of water, Disp: 12 tablet, Rfl: 0    Ascorbic Acid (VITAMIN C) 250 MG tablet, Take 500 mg by mouth daily Gummie, Disp: , Rfl:     Cholecalciferol (VITAMIN D3) 50 MCG (2000 UT) CAPS, Take 1 capsule by mouth daily Gummie, Disp: , Rfl:     MULTIPLE VITAMINS PO, Take 1 tablet by mouth daily Gummie, Disp: , Rfl:     melatonin 3 MG TABS tablet, Take 3 mg by mouth nightly as needed , Disp: , Rfl:      Allergies   Allergen Reactions    Latex Anaphylaxis, Hives and Rash    Iodine Hives    Trazodone And Nefazodone Nausea And Vomiting    Bactrim Hives    Codeine     Iodides Hives    Aspirin Hives and Nausea And Vomiting    Tylenol With Codeine [Acetaminophen-Codeine] Nausea And Vomiting    Ultram [Tramadol Hcl] Nausea And Vomiting    Vicodin Tuss [Hydrocodone-Guaifenesin] Nausea And Vomiting        There were no vitals filed for this visit. Physical Exam:  General: pleasant, alert     Breasts: deferred     Pelvic exam: deferred     Abdi Able was seen today for other. Diagnoses and all orders for this visit:    Abnormal uterine bleeding  -     Drospirenone 4 MG TABS; Take 1 tablet by mouth daily    Hot flashes  -     PARoxetine (PAXIL) 10 MG tablet; Take 1 tablet by mouth daily    Encounter for screening mammogram for breast cancer  -     LISE BELEN DIGITAL SCREEN BILATERAL; Future    Increased risk of breast cancer  -     Basic Metabolic Panel; Future  -     Protime-INR; Future  -     MRI BREAST BILATERAL WO CONTRAST; Future    Medications reviewed, advised to call with questions or concerns. Return in about 1 year (around 2/16/2024) for Annual, or as needed.      Jaye Perla MD

## 2023-02-16 NOTE — PROGRESS NOTES
Patient alert and pleasant with no new concerns  Here today for medication follow up   Discharge instructions have been discussed with the patient. Patient advised to call our office with any questions or concerns. Voiced understanding.

## 2023-02-17 ENCOUNTER — TELEPHONE (OUTPATIENT)
Dept: GENERAL RADIOLOGY | Age: 48
End: 2023-02-17

## 2023-02-17 NOTE — TELEPHONE ENCOUNTER
Returned patient's call regarding scheduling breast MRI. Patient is not due until after 7-13-23. Left patient a voice mail.

## 2023-02-28 ENCOUNTER — HOSPITAL ENCOUNTER (OUTPATIENT)
Dept: GENERAL RADIOLOGY | Age: 48
Discharge: HOME | End: 2023-03-02
Payer: MEDICAID

## 2023-02-28 DIAGNOSIS — Z12.31 ENCOUNTER FOR SCREENING MAMMOGRAM FOR BREAST CANCER: ICD-10-CM

## 2023-02-28 PROCEDURE — 77067 SCR MAMMO BI INCL CAD: CPT

## 2023-03-07 DIAGNOSIS — Z91.89 INCREASED RISK OF BREAST CANCER: Primary | ICD-10-CM

## 2023-05-06 DIAGNOSIS — J01.90 ACUTE SINUSITIS, RECURRENCE NOT SPECIFIED, UNSPECIFIED LOCATION: ICD-10-CM

## 2023-05-08 RX ORDER — METHYLPREDNISOLONE 4 MG/1
TABLET ORAL
Refills: 0 | OUTPATIENT
Start: 2023-05-08

## 2023-07-28 ENCOUNTER — OFFICE VISIT (OUTPATIENT)
Dept: FAMILY MEDICINE CLINIC | Age: 48
End: 2023-07-28
Payer: MEDICAID

## 2023-07-28 VITALS
SYSTOLIC BLOOD PRESSURE: 96 MMHG | RESPIRATION RATE: 18 BRPM | HEIGHT: 60 IN | WEIGHT: 212.2 LBS | BODY MASS INDEX: 41.66 KG/M2 | OXYGEN SATURATION: 97 % | DIASTOLIC BLOOD PRESSURE: 64 MMHG | TEMPERATURE: 97.9 F | HEART RATE: 104 BPM

## 2023-07-28 DIAGNOSIS — Z13.220 LIPID SCREENING: ICD-10-CM

## 2023-07-28 DIAGNOSIS — Z00.00 ENCOUNTER FOR WELL ADULT EXAM WITHOUT ABNORMAL FINDINGS: Primary | ICD-10-CM

## 2023-07-28 DIAGNOSIS — R11.0 NAUSEA: ICD-10-CM

## 2023-07-28 DIAGNOSIS — J32.8 OTHER CHRONIC SINUSITIS: ICD-10-CM

## 2023-07-28 DIAGNOSIS — G43.109 MIGRAINE WITH AURA AND WITHOUT STATUS MIGRAINOSUS, NOT INTRACTABLE: Chronic | ICD-10-CM

## 2023-07-28 DIAGNOSIS — R23.2 HOT FLASHES: ICD-10-CM

## 2023-07-28 DIAGNOSIS — M25.561 ACUTE PAIN OF RIGHT KNEE: ICD-10-CM

## 2023-07-28 LAB
ALBUMIN SERPL-MCNC: 4.5 G/DL (ref 3.5–5.2)
ALP BLD-CCNC: 91 U/L (ref 35–104)
ALT SERPL-CCNC: 20 U/L (ref 0–32)
ANION GAP SERPL CALCULATED.3IONS-SCNC: 14 MMOL/L (ref 7–16)
AST SERPL-CCNC: 21 U/L (ref 0–31)
BASOPHILS ABSOLUTE: 0.08 K/UL (ref 0–0.2)
BASOPHILS RELATIVE PERCENT: 1 % (ref 0–2)
BILIRUB SERPL-MCNC: 0.3 MG/DL (ref 0–1.2)
BUN BLDV-MCNC: 8 MG/DL (ref 6–20)
CALCIUM SERPL-MCNC: 9.3 MG/DL (ref 8.6–10.2)
CHLORIDE BLD-SCNC: 108 MMOL/L (ref 98–107)
CHOLESTEROL: 167 MG/DL
CO2: 20 MMOL/L (ref 22–29)
CREAT SERPL-MCNC: 1 MG/DL (ref 0.5–1)
EOSINOPHILS ABSOLUTE: 0.13 K/UL (ref 0.05–0.5)
EOSINOPHILS RELATIVE PERCENT: 2 % (ref 0–6)
GFR SERPL CREATININE-BSD FRML MDRD: >60 ML/MIN/1.73M2
GLUCOSE BLD-MCNC: 94 MG/DL (ref 74–99)
HCT VFR BLD CALC: 38.7 % (ref 34–48)
HDLC SERPL-MCNC: 43 MG/DL
HEMOGLOBIN: 11.1 G/DL (ref 11.5–15.5)
LDL CHOLESTEROL: 100 MG/DL
LYMPHOCYTES ABSOLUTE: 1.82 K/UL (ref 1.5–4)
LYMPHOCYTES RELATIVE PERCENT: 23 % (ref 20–42)
MCH RBC QN AUTO: 23.6 PG (ref 26–35)
MCHC RBC AUTO-ENTMCNC: 28.7 G/DL (ref 32–34.5)
MCV RBC AUTO: 82.3 FL (ref 80–99.9)
MONOCYTES ABSOLUTE: 0.5 K/UL (ref 0.1–0.95)
MONOCYTES RELATIVE PERCENT: 6 % (ref 2–12)
NEUTROPHILS ABSOLUTE: 5.33 K/UL (ref 1.8–7.3)
NEUTROPHILS RELATIVE PERCENT: 68 % (ref 43–80)
PDW BLD-RTO: 17.5 % (ref 11.5–15)
PLATELET # BLD: 363 K/UL (ref 130–450)
PMV BLD AUTO: 10.1 FL (ref 7–12)
POTASSIUM SERPL-SCNC: 4.8 MMOL/L (ref 3.5–5)
RBC # BLD: 4.7 M/UL (ref 3.5–5.5)
RBC # BLD: ABNORMAL 10*6/UL
SODIUM BLD-SCNC: 142 MMOL/L (ref 132–146)
TOTAL PROTEIN: 7.8 G/DL (ref 6.4–8.3)
TRIGL SERPL-MCNC: 118 MG/DL
VLDLC SERPL CALC-MCNC: 24 MG/DL
WBC # BLD: 7.9 K/UL (ref 4.5–11.5)

## 2023-07-28 PROCEDURE — 99396 PREV VISIT EST AGE 40-64: CPT | Performed by: INTERNAL MEDICINE

## 2023-07-28 RX ORDER — SUMATRIPTAN 100 MG/1
TABLET, FILM COATED ORAL
Qty: 10 TABLET | Refills: 5 | Status: SHIPPED | OUTPATIENT
Start: 2023-07-28

## 2023-07-28 RX ORDER — ONDANSETRON 4 MG/1
TABLET, ORALLY DISINTEGRATING ORAL
Qty: 30 TABLET | Refills: 5 | Status: SHIPPED | OUTPATIENT
Start: 2023-07-28

## 2023-07-28 RX ORDER — LORATADINE 10 MG/1
10 TABLET ORAL DAILY
Qty: 90 TABLET | Refills: 3 | Status: SHIPPED | OUTPATIENT
Start: 2023-07-28

## 2023-07-28 RX ORDER — PAROXETINE 10 MG/1
10 TABLET, FILM COATED ORAL DAILY
Qty: 90 TABLET | Refills: 3 | Status: SHIPPED | OUTPATIENT
Start: 2023-07-28

## 2023-07-28 RX ORDER — AMITRIPTYLINE HYDROCHLORIDE 150 MG/1
150 TABLET ORAL NIGHTLY
Qty: 90 TABLET | Refills: 3 | Status: SHIPPED | OUTPATIENT
Start: 2023-07-28

## 2023-07-28 NOTE — PROGRESS NOTES
[facility] PRIMARY CARE  [address] [place] [id number]  Dept: [phone]  Dept Fax: [phone]     NAME: Paz Oden        :  1975        MRN:  [id number]    Chief Complaint   Patient presents with    Follow-up     Back of neck along hairline small bump not painful occasional itch,     Migraine    Knee Pain     Right knee popping and painful x 3 days ago, when in high school had water on the knee and wondering if this could happen again. Subjective     History of Present Illness  Paz Oden is a 51 y.o. female who presents today for routine follow up and medication refill. Has had knee effusions in the past due to high school sports, no surgeries or ligament injuries in the past     Has a small bump felt on her posterior neck at the kati of her skull on the left. Has been present for several years, not painful. Review of Systems  Please see HPI above. Comprehensive review of systems negative unless otherwise noted above.     Past Medical Hx:  Past Medical History:   Diagnosis Date    Degenerative joint disease of low back 2021    Migraine 20 years    Sinusitis (chronic)        Past Surgical Hx:  Past Surgical History:   Procedure Laterality Date    ADENOIDECTOMY      BREAST BIOPSY  approx     Patient not sure which site    CHOLECYSTECTOMY  age 24    TONSILLECTOMY  age 1    WISDOM TOOTH EXTRACTION         Family Hx:  Family History   Problem Relation Age of Onset    Cancer Mother 29        breast, bilateral, more than once; uterine cancer    Heart Disease Mother     Asthma Mother     Stroke Mother     Migraines Mother     Arthritis Mother     Colon Cancer Mother 27        rectal cancer    High Blood Pressure Father     Cancer Father         prostate    Migraines Father     Cancer Sister 29        breast and ovarian    Stroke Sister     Breast Cancer Maternal Grandmother     Cancer Paternal Grandmother         breast

## 2023-09-25 ENCOUNTER — OFFICE VISIT (OUTPATIENT)
Dept: FAMILY MEDICINE CLINIC | Age: 48
End: 2023-09-25
Payer: MEDICAID

## 2023-09-25 VITALS
WEIGHT: 200 LBS | HEART RATE: 98 BPM | RESPIRATION RATE: 16 BRPM | SYSTOLIC BLOOD PRESSURE: 110 MMHG | OXYGEN SATURATION: 98 % | HEIGHT: 60 IN | BODY MASS INDEX: 39.27 KG/M2 | TEMPERATURE: 97 F | DIASTOLIC BLOOD PRESSURE: 70 MMHG

## 2023-09-25 DIAGNOSIS — J32.9 SINOBRONCHITIS: Primary | ICD-10-CM

## 2023-09-25 DIAGNOSIS — J40 SINOBRONCHITIS: Primary | ICD-10-CM

## 2023-09-25 DIAGNOSIS — R09.81 SINUS CONGESTION: ICD-10-CM

## 2023-09-25 DIAGNOSIS — R05.1 ACUTE COUGH: ICD-10-CM

## 2023-09-25 PROCEDURE — 99213 OFFICE O/P EST LOW 20 MIN: CPT | Performed by: NURSE PRACTITIONER

## 2023-09-25 PROCEDURE — 1036F TOBACCO NON-USER: CPT | Performed by: NURSE PRACTITIONER

## 2023-09-25 PROCEDURE — G8427 DOCREV CUR MEDS BY ELIG CLIN: HCPCS | Performed by: NURSE PRACTITIONER

## 2023-09-25 PROCEDURE — G8417 CALC BMI ABV UP PARAM F/U: HCPCS | Performed by: NURSE PRACTITIONER

## 2023-09-25 RX ORDER — AMOXICILLIN AND CLAVULANATE POTASSIUM 875; 125 MG/1; MG/1
1 TABLET, FILM COATED ORAL 2 TIMES DAILY
Qty: 20 TABLET | Refills: 0 | Status: SHIPPED | OUTPATIENT
Start: 2023-09-25 | End: 2023-10-05

## 2023-09-25 RX ORDER — ALBUTEROL SULFATE 90 UG/1
2 AEROSOL, METERED RESPIRATORY (INHALATION) EVERY 4 HOURS PRN
Qty: 1 EACH | Refills: 0 | Status: SHIPPED | OUTPATIENT
Start: 2023-09-25

## 2023-09-25 RX ORDER — BENZONATATE 100 MG/1
CAPSULE ORAL
Qty: 30 CAPSULE | Refills: 0 | Status: SHIPPED | OUTPATIENT
Start: 2023-09-25

## 2023-09-25 NOTE — PROGRESS NOTES
myself)  Labs:  No results found for this visit on 09/25/23. Imaging: All Radiology results interpreted by Radiologist unless otherwise noted. No orders to display       Assessment / Plan:   The patient's vitals, allergies, medications, and past medical history have been reviewed. Kerry Suarez was seen today for otalgia, drainage and cough. Diagnoses and all orders for this visit:    Sinobronchitis  -     amoxicillin-clavulanate (AUGMENTIN) 875-125 MG per tablet; Take 1 tablet by mouth 2 times daily for 10 days  -     benzonatate (TESSALON PERLES) 100 MG capsule; 1-2 tablets every 8 hours as needed for cough    Sinus congestion    Acute cough  -     albuterol sulfate HFA (PROVENTIL;VENTOLIN;PROAIR) 108 (90 Base) MCG/ACT inhaler; Inhale 2 puffs into the lungs every 4 hours as needed for Wheezing or Shortness of Breath        - Disposition: Home    - Educational material printed for patient's review and were included in patient instructions. After Visit Summary was given to patient at the end of visit. - Encouraged oral fluids and rest. Discussed symptomatic treatments with patient today (Mucinex prn congestion). The patient is to follow-up with PCP in the next 2-3 days for reevaluation. Red flag symptoms were also discussed with the patient today. If symptoms worsen the patient is to go directly to the emergency department for reevaluation and treatment. Pt verbalizes understanding and is in agreement with plan of care. All questions answered. SIGNATURE: MARILY Hill    *NOTE: This report was transcribed using voice recognition software. Every effort was made to ensure accuracy; however, inadvertent computerized transcription errors may be present.

## 2023-10-13 ENCOUNTER — TELEPHONE (OUTPATIENT)
Dept: GENERAL RADIOLOGY | Age: 48
End: 2023-10-13

## 2023-10-13 NOTE — TELEPHONE ENCOUNTER
Patient left a voice mail that she wanted to schedule breast MRI. I returned her call, and left her a voice mail. MRI is authorized, order given to schedulers, they will contact her next.

## 2023-10-31 ENCOUNTER — HOSPITAL ENCOUNTER (OUTPATIENT)
Dept: MRI IMAGING | Age: 48
Discharge: HOME | End: 2023-11-02
Attending: OBSTETRICS & GYNECOLOGY
Payer: MEDICAID

## 2023-10-31 ENCOUNTER — OFFICE VISIT (OUTPATIENT)
Dept: FAMILY MEDICINE CLINIC | Age: 48
End: 2023-10-31
Payer: MEDICAID

## 2023-10-31 VITALS
TEMPERATURE: 98.6 F | OXYGEN SATURATION: 95 % | SYSTOLIC BLOOD PRESSURE: 124 MMHG | DIASTOLIC BLOOD PRESSURE: 80 MMHG | BODY MASS INDEX: 41.23 KG/M2 | HEART RATE: 100 BPM | HEIGHT: 60 IN | WEIGHT: 210 LBS

## 2023-10-31 DIAGNOSIS — J32.8 OTHER CHRONIC SINUSITIS: ICD-10-CM

## 2023-10-31 DIAGNOSIS — G43.109 MIGRAINE WITH AURA AND WITHOUT STATUS MIGRAINOSUS, NOT INTRACTABLE: Primary | Chronic | ICD-10-CM

## 2023-10-31 DIAGNOSIS — Z91.89 INCREASED RISK OF BREAST CANCER: ICD-10-CM

## 2023-10-31 DIAGNOSIS — R42 VERTIGO: ICD-10-CM

## 2023-10-31 DIAGNOSIS — F32.89 OTHER DEPRESSION: ICD-10-CM

## 2023-10-31 DIAGNOSIS — R23.2 HOT FLASHES: ICD-10-CM

## 2023-10-31 PROBLEM — F32.A DEPRESSION: Status: ACTIVE | Noted: 2023-10-31

## 2023-10-31 PROCEDURE — 99214 OFFICE O/P EST MOD 30 MIN: CPT | Performed by: INTERNAL MEDICINE

## 2023-10-31 PROCEDURE — G8484 FLU IMMUNIZE NO ADMIN: HCPCS | Performed by: INTERNAL MEDICINE

## 2023-10-31 PROCEDURE — A9585 GADOBUTROL INJECTION: HCPCS | Performed by: RADIOLOGY

## 2023-10-31 PROCEDURE — 1036F TOBACCO NON-USER: CPT | Performed by: INTERNAL MEDICINE

## 2023-10-31 PROCEDURE — 6360000004 HC RX CONTRAST MEDICATION: Performed by: RADIOLOGY

## 2023-10-31 PROCEDURE — G8427 DOCREV CUR MEDS BY ELIG CLIN: HCPCS | Performed by: INTERNAL MEDICINE

## 2023-10-31 PROCEDURE — G8417 CALC BMI ABV UP PARAM F/U: HCPCS | Performed by: INTERNAL MEDICINE

## 2023-10-31 PROCEDURE — C8908 MRI W/O FOL W/CONT, BREAST,: HCPCS

## 2023-10-31 RX ORDER — SUMATRIPTAN 100 MG/1
TABLET, FILM COATED ORAL
Qty: 10 TABLET | Refills: 5 | Status: SHIPPED | OUTPATIENT
Start: 2023-10-31

## 2023-10-31 RX ORDER — AMITRIPTYLINE HYDROCHLORIDE 150 MG/1
150 TABLET ORAL NIGHTLY
Qty: 90 TABLET | Refills: 3 | Status: SHIPPED | OUTPATIENT
Start: 2023-10-31

## 2023-10-31 RX ORDER — PAROXETINE 10 MG/1
10 TABLET, FILM COATED ORAL DAILY
Qty: 90 TABLET | Refills: 3 | Status: SHIPPED | OUTPATIENT
Start: 2023-10-31

## 2023-10-31 RX ORDER — MECLIZINE HYDROCHLORIDE 25 MG/1
25 TABLET ORAL 3 TIMES DAILY PRN
Qty: 15 TABLET | Refills: 0 | Status: SHIPPED | OUTPATIENT
Start: 2023-10-31 | End: 2023-11-10

## 2023-10-31 RX ORDER — GADOBUTROL 604.72 MG/ML
10 INJECTION INTRAVENOUS
Status: COMPLETED | OUTPATIENT
Start: 2023-10-31 | End: 2023-10-31

## 2023-10-31 RX ORDER — LORATADINE 10 MG/1
10 TABLET ORAL DAILY
Qty: 90 TABLET | Refills: 3 | Status: SHIPPED | OUTPATIENT
Start: 2023-10-31

## 2023-10-31 RX ADMIN — GADOBUTROL 10 ML: 604.72 INJECTION INTRAVENOUS at 12:20

## 2024-02-22 ENCOUNTER — TELEPHONE (OUTPATIENT)
Dept: OBGYN | Age: 49
End: 2024-02-22

## 2024-02-26 DIAGNOSIS — Z12.31 ENCOUNTER FOR SCREENING MAMMOGRAM FOR BREAST CANCER: Primary | ICD-10-CM

## 2024-02-26 NOTE — TELEPHONE ENCOUNTER
Called patient, no answer. Message left on voicemail with the number to [facility].

## 2024-03-06 ENCOUNTER — OFFICE VISIT (OUTPATIENT)
Dept: FAMILY MEDICINE CLINIC | Age: 49
End: 2024-03-06
Payer: MEDICAID

## 2024-03-06 VITALS
BODY MASS INDEX: 40.52 KG/M2 | HEIGHT: 60 IN | HEART RATE: 91 BPM | WEIGHT: 206.4 LBS | OXYGEN SATURATION: 97 % | TEMPERATURE: 97.4 F | DIASTOLIC BLOOD PRESSURE: 70 MMHG | SYSTOLIC BLOOD PRESSURE: 100 MMHG

## 2024-03-06 DIAGNOSIS — J01.90 ACUTE SINUSITIS, RECURRENCE NOT SPECIFIED, UNSPECIFIED LOCATION: Primary | ICD-10-CM

## 2024-03-06 PROCEDURE — G8427 DOCREV CUR MEDS BY ELIG CLIN: HCPCS

## 2024-03-06 PROCEDURE — 1036F TOBACCO NON-USER: CPT

## 2024-03-06 PROCEDURE — G8417 CALC BMI ABV UP PARAM F/U: HCPCS

## 2024-03-06 PROCEDURE — 99213 OFFICE O/P EST LOW 20 MIN: CPT

## 2024-03-06 PROCEDURE — G8482 FLU IMMUNIZE ORDER/ADMIN: HCPCS

## 2024-03-06 RX ORDER — DROSPIRENONE 4 MG/1
1 TABLET, FILM COATED ORAL DAILY
COMMUNITY
Start: 2024-02-03

## 2024-03-06 RX ORDER — CEFDINIR 300 MG/1
300 CAPSULE ORAL 2 TIMES DAILY
Qty: 20 CAPSULE | Refills: 0 | Status: SHIPPED | OUTPATIENT
Start: 2024-03-06 | End: 2024-03-16

## 2024-03-06 RX ORDER — AZELASTINE HYDROCHLORIDE 0.5 MG/ML
SOLUTION/ DROPS OPHTHALMIC
COMMUNITY
Start: 2023-12-23

## 2024-03-06 RX ORDER — CHLORPHENIRAMINE MALEATE 4 MG/1
4 TABLET ORAL
COMMUNITY
Start: 2019-11-12

## 2024-03-06 RX ORDER — METHYLPREDNISOLONE 4 MG/1
TABLET ORAL
Qty: 1 KIT | Refills: 0 | Status: SHIPPED | OUTPATIENT
Start: 2024-03-06

## 2024-03-06 RX ORDER — CHLORHEXIDINE GLUCONATE ORAL RINSE 1.2 MG/ML
SOLUTION DENTAL
COMMUNITY
Start: 2024-02-06

## 2024-03-06 SDOH — ECONOMIC STABILITY: FOOD INSECURITY: WITHIN THE PAST 12 MONTHS, THE FOOD YOU BOUGHT JUST DIDN'T LAST AND YOU DIDN'T HAVE MONEY TO GET MORE.: NEVER TRUE

## 2024-03-06 SDOH — ECONOMIC STABILITY: FOOD INSECURITY: WITHIN THE PAST 12 MONTHS, YOU WORRIED THAT YOUR FOOD WOULD RUN OUT BEFORE YOU GOT MONEY TO BUY MORE.: NEVER TRUE

## 2024-03-06 SDOH — ECONOMIC STABILITY: INCOME INSECURITY: HOW HARD IS IT FOR YOU TO PAY FOR THE VERY BASICS LIKE FOOD, HOUSING, MEDICAL CARE, AND HEATING?: NOT HARD AT ALL

## 2024-03-06 ASSESSMENT — PATIENT HEALTH QUESTIONNAIRE - PHQ9
2. FEELING DOWN, DEPRESSED OR HOPELESS: 0
9. THOUGHTS THAT YOU WOULD BE BETTER OFF DEAD, OR OF HURTING YOURSELF: 0
SUM OF ALL RESPONSES TO PHQ QUESTIONS 1-9: 0
SUM OF ALL RESPONSES TO PHQ QUESTIONS 1-9: 0
5. POOR APPETITE OR OVEREATING: 0
6. FEELING BAD ABOUT YOURSELF - OR THAT YOU ARE A FAILURE OR HAVE LET YOURSELF OR YOUR FAMILY DOWN: 0
8. MOVING OR SPEAKING SO SLOWLY THAT OTHER PEOPLE COULD HAVE NOTICED. OR THE OPPOSITE, BEING SO FIGETY OR RESTLESS THAT YOU HAVE BEEN MOVING AROUND A LOT MORE THAN USUAL: 0
7. TROUBLE CONCENTRATING ON THINGS, SUCH AS READING THE NEWSPAPER OR WATCHING TELEVISION: 0
3. TROUBLE FALLING OR STAYING ASLEEP: 0
1. LITTLE INTEREST OR PLEASURE IN DOING THINGS: 0
4. FEELING TIRED OR HAVING LITTLE ENERGY: 0
SUM OF ALL RESPONSES TO PHQ QUESTIONS 1-9: 0
SUM OF ALL RESPONSES TO PHQ9 QUESTIONS 1 & 2: 0
10. IF YOU CHECKED OFF ANY PROBLEMS, HOW DIFFICULT HAVE THESE PROBLEMS MADE IT FOR YOU TO DO YOUR WORK, TAKE CARE OF THINGS AT HOME, OR GET ALONG WITH OTHER PEOPLE: 0
SUM OF ALL RESPONSES TO PHQ QUESTIONS 1-9: 0

## 2024-03-06 NOTE — PROGRESS NOTES
Chief Complaint       Drainage (Down throat), Nausea, Headache, Fatigue, Nasal Congestion (Blowing out greenish snot), Cough (Greenish thick, dry), Facial Pain (Frontal pressure, and symptoms over a month.), and Otalgia (Bilateral ears pain and feels like in a cave or tunnel.)    History of Present Illness   Source of history provided by:  patient.      Samantha Anglin is a 48 y.o. old female presenting to the walk in clinic for evaluation of sinus pressure, nasal congestion, discolored nasal drainage, bilateral ear pressure, mild non-productive cough and sore throat x the past few weeks.  Denies any fever, chills, wheezing, CP, SOB, or GI symptoms.  Denies any hx of asthma, COPD, or tobacco use.     ROS    Unless otherwise stated in this report or unable to obtain because of the patient's clinical or mental status as evidenced by the medical record, this patients's positive and negative responses for Review of Systems, constitutional, psych, eyes, ENT, cardiovascular, respiratory, gastrointestinal, neurological, genitourinary, musculoskeletal, integument systems and systems related to the presenting problem are either stated in the preceding or were not pertinent or were negative for the symptoms and/or complaints related to the medical problem.    Physical Exam         VS:  /70   Pulse 91   Temp 97.4 °F (36.3 °C) (Temporal)   Ht 1.524 m (5')   Wt 93.6 kg (206 lb 6.4 oz)   LMP  (LMP Unknown)   SpO2 97%   BMI 40.31 kg/m²    Oxygen Saturation Interpretation: Normal.    Constitutional:  Alert, development consistent with age.  Head: Mild TTP over the frontal sinuses.  Ears:  External Ears: Bilateral pinna normal. TMs with serous effusions bilaterally without erythema or perforation bilaterally.  Canals normal bilaterally without swelling or exudate  Nose: Mild congestion of the nasal mucosa. There is no injection to middle turbinates bilaterally.   Throat: Mild posterior pharyngeal erythema with

## 2024-03-11 ENCOUNTER — TELEPHONE (OUTPATIENT)
Dept: OBGYN | Age: 49
End: 2024-03-11

## 2024-03-11 NOTE — TELEPHONE ENCOUNTER
Patient called in requesting a refill on her Birth Control, her last annual was 2021.  She is coming in to see Socorro Blair in April, will you refill her medication since she will be out of it?

## 2024-03-25 ENCOUNTER — OFFICE VISIT (OUTPATIENT)
Dept: FAMILY MEDICINE CLINIC | Age: 49
End: 2024-03-25
Payer: MEDICAID

## 2024-03-25 VITALS
TEMPERATURE: 97.5 F | RESPIRATION RATE: 18 BRPM | HEART RATE: 114 BPM | DIASTOLIC BLOOD PRESSURE: 68 MMHG | WEIGHT: 208 LBS | BODY MASS INDEX: 40.84 KG/M2 | OXYGEN SATURATION: 96 % | SYSTOLIC BLOOD PRESSURE: 98 MMHG | HEIGHT: 60 IN

## 2024-03-25 DIAGNOSIS — M25.561 CHRONIC PAIN OF BOTH KNEES: ICD-10-CM

## 2024-03-25 DIAGNOSIS — Z00.00 ENCOUNTER FOR WELL ADULT EXAM WITHOUT ABNORMAL FINDINGS: Primary | ICD-10-CM

## 2024-03-25 DIAGNOSIS — G89.29 CHRONIC PAIN OF BOTH KNEES: ICD-10-CM

## 2024-03-25 DIAGNOSIS — M25.562 CHRONIC PAIN OF BOTH KNEES: ICD-10-CM

## 2024-03-25 DIAGNOSIS — R06.02 SHORTNESS OF BREATH: ICD-10-CM

## 2024-03-25 PROCEDURE — 99396 PREV VISIT EST AGE 40-64: CPT | Performed by: INTERNAL MEDICINE

## 2024-03-25 PROCEDURE — G8482 FLU IMMUNIZE ORDER/ADMIN: HCPCS | Performed by: INTERNAL MEDICINE

## 2024-03-25 RX ORDER — ALBUTEROL SULFATE 90 UG/1
2 AEROSOL, METERED RESPIRATORY (INHALATION) EVERY 6 HOURS PRN
Qty: 18 G | Refills: 5 | Status: SHIPPED | OUTPATIENT
Start: 2024-03-25

## 2024-03-25 NOTE — PROGRESS NOTES
[facility] [facility] PRIMARY CARE  [address] [address]  Dept: [phone]  Dept Fax: [phone]     NAME: Samantha Anglin        :  1975        MRN:  [id number]    Chief Complaint   Patient presents with    Breathing Problem     Has reaction to smoke and people are outside home smoking marijuana/cigs , causing her to have breathing issues    Other     Paperwork filled out for transportation to work, [employer]     Annual Exam       Subjective     History of Present Illness  Samantha Anglin is a 48 y.o. female who presents today for routine follow up and [employer] paperwork.       Review of Systems  Please see HPI above. Comprehensive review of systems negative unless otherwise noted above.    Past Medical Hx:  Past Medical History:   Diagnosis Date    Degenerative joint disease of low back 2021    Depression 10/31/2023    Migraine 20 years    Sinusitis (chronic)        Past Surgical Hx:  Past Surgical History:   Procedure Laterality Date    ADENOIDECTOMY      BREAST BIOPSY  approx     Patient not sure which site    CHOLECYSTECTOMY  age 23    TONSILLECTOMY  age 2    WISDOM TOOTH EXTRACTION         Family Hx:  Family History   Problem Relation Age of Onset    Cancer Mother 34        breast, bilateral, more than once; uterine cancer    Heart Disease Mother     Asthma Mother     Stroke Mother     Migraines Mother     Arthritis Mother     Colon Cancer Mother 30        rectal cancer    High Blood Pressure Father     Cancer Father         prostate    Migraines Father     Cancer Sister 34        breast and ovarian    Stroke Sister     Breast Cancer Maternal Grandmother     Cancer Paternal Grandmother         breast    Cancer Paternal Grandfather         prostate    Migraines Sister     Breast Cancer Maternal Aunt     Cancer Maternal Aunt         ovarian cancer    Colon Cancer Maternal Aunt     Breast Cancer Paternal Aunt     Prostate Cancer Paternal Uncle

## 2024-03-25 NOTE — PATIENT INSTRUCTIONS
hands, brush your teeth twice a day, and wear a seat belt in the car.   Where can you learn more?  Go to [email or web address] and enter P072 to learn more about \"Well Visit, Ages 18 to 65: Care Instructions.\"  Current as of: August 6, 2023               Content Version: 14.0  © 6232-8304 [employer].   Care instructions adapted under license by [employer]. If you have questions about a medical condition or this instruction, always ask your healthcare professional. Healthwise, [employer] disclaims any warranty or liability for your use of this information.

## 2024-03-29 ENCOUNTER — HOSPITAL ENCOUNTER (OUTPATIENT)
Dept: GENERAL RADIOLOGY | Age: 49
End: 2024-03-29
Attending: OBSTETRICS & GYNECOLOGY
Payer: MEDICAID

## 2024-03-29 DIAGNOSIS — Z12.31 ENCOUNTER FOR SCREENING MAMMOGRAM FOR BREAST CANCER: ICD-10-CM

## 2024-03-29 PROCEDURE — 77063 BREAST TOMOSYNTHESIS BI: CPT

## 2024-04-03 ENCOUNTER — TELEPHONE (OUTPATIENT)
Dept: FAMILY MEDICINE CLINIC | Age: 49
End: 2024-04-03

## 2024-04-03 DIAGNOSIS — R29.898 ANKLE WEAKNESS: Primary | ICD-10-CM

## 2024-04-09 DIAGNOSIS — Z91.89 INCREASED RISK OF BREAST CANCER: Primary | ICD-10-CM

## 2024-05-06 ENCOUNTER — TELEPHONE (OUTPATIENT)
Dept: ADMINISTRATIVE | Age: 49
End: 2024-05-06

## 2024-05-06 NOTE — TELEPHONE ENCOUNTER
Pt called to reschedule her annual gyn appt w/[facility]; unable to reach office due to patient care;  please call cell first [phone] [email or web address]

## 2024-05-28 ENCOUNTER — HOSPITAL ENCOUNTER (OUTPATIENT)
Dept: PHYSICAL THERAPY | Age: 49
Setting detail: THERAPIES SERIES
Discharge: HOME | End: 2024-05-28
Payer: MEDICAID

## 2024-05-28 ENCOUNTER — OFFICE VISIT (OUTPATIENT)
Dept: OBGYN | Age: 49
End: 2024-05-28
Payer: MEDICAID

## 2024-05-28 ENCOUNTER — OFFICE VISIT (OUTPATIENT)
Dept: FAMILY MEDICINE CLINIC | Age: 49
End: 2024-05-28
Payer: MEDICAID

## 2024-05-28 VITALS
RESPIRATION RATE: 16 BRPM | HEART RATE: 94 BPM | WEIGHT: 206.7 LBS | DIASTOLIC BLOOD PRESSURE: 70 MMHG | TEMPERATURE: 97.5 F | SYSTOLIC BLOOD PRESSURE: 110 MMHG | HEIGHT: 60 IN | OXYGEN SATURATION: 97 % | BODY MASS INDEX: 40.58 KG/M2

## 2024-05-28 VITALS
HEART RATE: 100 BPM | DIASTOLIC BLOOD PRESSURE: 66 MMHG | SYSTOLIC BLOOD PRESSURE: 137 MMHG | WEIGHT: 207 LBS | BODY MASS INDEX: 40.64 KG/M2 | HEIGHT: 60 IN

## 2024-05-28 DIAGNOSIS — Z01.419 ENCOUNTER FOR ANNUAL ROUTINE GYNECOLOGICAL EXAMINATION: Primary | ICD-10-CM

## 2024-05-28 DIAGNOSIS — R23.2 HOT FLASHES: ICD-10-CM

## 2024-05-28 DIAGNOSIS — M25.561 ACUTE PAIN OF RIGHT KNEE: Primary | ICD-10-CM

## 2024-05-28 DIAGNOSIS — F32.89 OTHER DEPRESSION: ICD-10-CM

## 2024-05-28 PROCEDURE — G8417 CALC BMI ABV UP PARAM F/U: HCPCS | Performed by: MIDWIFE

## 2024-05-28 PROCEDURE — 1036F TOBACCO NON-USER: CPT | Performed by: NURSE PRACTITIONER

## 2024-05-28 PROCEDURE — G8427 DOCREV CUR MEDS BY ELIG CLIN: HCPCS | Performed by: NURSE PRACTITIONER

## 2024-05-28 PROCEDURE — 1036F TOBACCO NON-USER: CPT | Performed by: MIDWIFE

## 2024-05-28 PROCEDURE — G8427 DOCREV CUR MEDS BY ELIG CLIN: HCPCS | Performed by: MIDWIFE

## 2024-05-28 PROCEDURE — G8417 CALC BMI ABV UP PARAM F/U: HCPCS | Performed by: NURSE PRACTITIONER

## 2024-05-28 PROCEDURE — 3017F COLORECTAL CA SCREEN DOC REV: CPT | Performed by: NURSE PRACTITIONER

## 2024-05-28 PROCEDURE — 99214 OFFICE O/P EST MOD 30 MIN: CPT | Performed by: NURSE PRACTITIONER

## 2024-05-28 PROCEDURE — 97161 PT EVAL LOW COMPLEX 20 MIN: CPT | Performed by: PHYSICAL THERAPIST

## 2024-05-28 PROCEDURE — 99396 PREV VISIT EST AGE 40-64: CPT | Performed by: MIDWIFE

## 2024-05-28 PROCEDURE — 3017F COLORECTAL CA SCREEN DOC REV: CPT | Performed by: MIDWIFE

## 2024-05-28 RX ORDER — METHYLPREDNISOLONE 4 MG/1
TABLET ORAL
Qty: 1 KIT | Refills: 0 | Status: SHIPPED | OUTPATIENT
Start: 2024-05-28

## 2024-05-28 RX ORDER — DROSPIRENONE 4 MG/1
1 TABLET, FILM COATED ORAL DAILY
Qty: 28 TABLET | Refills: 5 | Status: SHIPPED | OUTPATIENT
Start: 2024-05-28

## 2024-05-28 RX ORDER — PAROXETINE 10 MG/1
10 TABLET, FILM COATED ORAL DAILY
Qty: 90 TABLET | Refills: 3 | Status: SHIPPED | OUTPATIENT
Start: 2024-05-28

## 2024-05-28 ASSESSMENT — PAIN DESCRIPTION - ORIENTATION: ORIENTATION: RIGHT;LEFT

## 2024-05-28 ASSESSMENT — PAIN DESCRIPTION - LOCATION: LOCATION: ANKLE

## 2024-05-28 ASSESSMENT — PAIN SCALES - GENERAL: PAINLEVEL_OUTOF10: 10

## 2024-05-28 ASSESSMENT — PAIN DESCRIPTION - PAIN TYPE: TYPE: CHRONIC PAIN

## 2024-05-28 ASSESSMENT — PAIN DESCRIPTION - DESCRIPTORS: DESCRIPTORS: ACHING;BURNING;NUMBNESS;TINGLING

## 2024-05-28 NOTE — PROGRESS NOTES
Subjective:      Samantha Anglin is a 49 y.o.  female,  presents with a complaint of Gynecologic Exam (Last PAP 10/5/21/Mammogram 3/29/24 /Needs Paxil refill )        Current Complaints: Routine Gyn Exam  Patient here for routine exam.  Current Complaints: no complaints.  Would like refill on paxil and OCP for hot flashes..  Pt no currently having cycles.        Gynecologic History  No LMP recorded (lmp unknown). Patient is postmenopausal.  Contraception: OCP (estrogen/progesterone)  Last Pap: 10/2021  Results: normal  Last Mammogram: 2024  Results: normal    Obstetric History  : 1  Para: 0  AB: 0      Gynecologic History  No LMP recorded (lmp unknown). Patient is postmenopausal.  Contraception: OCP (estrogen/progesterone)  Exercise: walking, active around house  OB History          1    Para   0    Term   0            AB   1    Living             SAB        IAB        Ectopic        Molar        Multiple        Live Births                  Patient's medications, allergies, past medical, surgical, social and family histories were reviewed and updated as appropriate.    Review of Systems  Constitutional: negative  Eyes: negative  Ears, nose, mouth, throat, and face: negative  Respiratory: negative  Cardiovascular: negative  Gastrointestinal: negative  Genitourinary:see above  Integument/breast: negative  Hematologic/lymphatic: negative  Musculoskeletal:negative  Neurological: negative  Behavioral/Psych: negative  Endocrine: negative  Allergic/Immunologic: negative     Objective:       /66   Pulse 100   Ht 1.524 m (5')   Wt 93.9 kg (207 lb)   LMP  (LMP Unknown)   BMI 40.43 kg/m²   General appearance: alert, appears stated age, and cooperative  Head: Normocephalic, without obvious abnormality, atraumatic  Eyes: No gross abnormalities.  Ears: normal  external ear canals both ears  Neck: no adenopathy, supple, symmetrical, trachea midline, and thyroid not enlarged,

## 2024-05-28 NOTE — PROGRESS NOTES
erythema noted.  Gait:  Steady gait, no difficulty noted.  Lymphatics: No lymphangitis or adenopathy noted.  Neurological:  Alert and oriented.  Motor functions intact.  Psychiatric: Mood and Affect: Mood normal. Behavior: Behavior normal    Testing:   (All laboratory and radiology results have been personally reviewed by myself)  Labs:  No results found for this visit on 05/28/24.    Imaging:  All Radiology results interpreted by Radiologist unless otherwise noted.  XR KNEE RIGHT (3 VIEWS)    (Results Pending)       Assessment / Plan:   The patient's vitals, allergies, medications, and past medical history have been reviewed.  Samantha was seen today for knee pain.    Diagnoses and all orders for this visit:    Acute pain of right knee  -     methylPREDNISolone (MEDROL DOSEPACK) 4 MG tablet; Take by mouth.  -     XR KNEE RIGHT (3 VIEWS); Future        - Disposition: Home    - Educational material printed for patient's review and were included in patient instructions. After Visit Summary was given to patient at the end of visit.    - The patient is instructed to avoid activities that exacerbate pain and benefits of RICE therapy. Discussed symptomatic treatments with the patient today (compression knee sleeve with ambulation). The patient is to follow-up with PCP in the next 2-3 days for reevaluation. Red flag symptoms were also discussed with the patient today. If symptoms worsen the patient is to go directly to the emergency department for reevaluation and treatment. Pt verbalizes understanding and is in agreement with plan of care. All questions answered.    SIGNATURE: NANCY Gunderson-CNP    *NOTE: This report was transcribed using voice recognition software. Every effort was made to ensure accuracy; however, inadvertent computerized transcription errors may be present.

## 2024-05-28 NOTE — PROGRESS NOTES
Physical Therapy: Initial Evaluation    Patient: Samantha Anglin (49 y.o. female)   Examination Date: 2024  Plan of Care Certification Period: 2024 to        :  1975 ;    Confirmed: Yes MRN: [id number]  CSN: [id number]   Insurance: Payor: [facility] MEDICAID / Plan: [facility] MEDICA / Product Type: *No Product type* /   Insurance ID: [id number] - (Medicaid Managed) Secondary Insurance (if applicable):    Referring Physician: Marielena Crocker, *     PCP: Marielena rCocker,  Visits to Date/Visits Approved:     No Show/Cancelled Appts:   /       Medical Diagnosis: Other symptoms and signs involving the musculoskeletal system [R29.898]    Treatment Diagnosis:       PERTINENT MEDICAL HISTORY           Medical History: Chart Reviewed: Yes   Past Medical History:   Diagnosis Date    Degenerative joint disease of low back 2021    Depression 10/31/2023    Migraine 20 years    Sinusitis (chronic)      Surgical History:   Past Surgical History:   Procedure Laterality Date    ADENOIDECTOMY      BREAST BIOPSY  approx     Patient not sure which site    CHOLECYSTECTOMY  age 23    TONSILLECTOMY  age 2    WISDOM TOOTH EXTRACTION         Medications:   Current Outpatient Medications:     albuterol sulfate HFA (PROVENTIL;VENTOLIN;PROAIR) 108 (90 Base) MCG/ACT inhaler, Inhale 2 puffs into the lungs every 6 hours as needed for Wheezing or Shortness of Breath, Disp: 18 g, Rfl: 5    azelastine (OPTIVAR) 0.05 % ophthalmic solution, INSTILL 1 DROP INTO BOTH EYES ONCE OR TWICE DAILY DEPENDING ON SEVERITY OF SYMPTOMS REMOVE CONTACT LENSES BEFORE USE, Disp: , Rfl:     chlorhexidine (PERIDEX) 0.12 % solution, SWISH 15 ML IN MOUTH FOR 30 SECONDS THEN SPIT OUT TWICE A DAY AFTER MEALS, Disp: , Rfl:     chlorpheniramine (CHLOR-TRIMETON) 4 MG tablet, Take 1 tablet by mouth, Disp: , Rfl:     SLYND 4 MG TABS, Take 1 tablet by mouth daily, Disp: , Rfl:     SUMAtriptan (IMITREX)

## 2024-05-28 NOTE — PROGRESS NOTES
[facility]                Phone: [phone]   Fax: [phone]    Physical Therapy Daily Treatment Note  Date:  2024    Patient Name:  Samantha Anglin    :  1975  MRN: [id number]    Evaluating therapist:  JOYCE Mccrary        (24)  Restrictions/Precautions:    Diagnosis:  ankle weakness  Treatment Diagnosis:    Insurance/Certification information:  [facility]  Referring Physician:  JAYSON Crocker  Plan of care signed (Y/N):    Visit# / total visits:    Pain level: 10/10   Time In:  Time Out:    Subjective:      Exercises:  Exercise/Equipment Resistance/Repetitions Other comments   toe crunches              [place] ankle:  PF/DF                          IN/EV            Total Gym squats          toe raises     step ups             side             rocker board:  A/P                            M/L     tandem amb     side/side amb     foam marching                            Other Therapeutic Activities:      Home Exercise Program:  provided 24    Manual Treatments:      Modalities:  IFC/MH to B ankles       Timed Code Treatment Minutes:      Total Treatment Minutes:      Treatment/Activity Tolerance:  [] Patient tolerated treatment well [] Patient limited by fatique  [] Patient limited by pain  [] Patient limited by other medical complications  [] Other:     Prognosis: [] Good [] Fair  [] Poor    Patient Requires Follow-up: [] Yes  [] No    Plan:   [] Continue per plan of care [] Alter current plan (see comments)  [] Plan of care initiated [] Hold pending MD visit [] Discharge  Plan for Next Session:      See Weekly Progress Note: []  Yes  []  No  Next due:        Electronically signed by:  Isai Hooks PT

## 2024-05-30 ENCOUNTER — HOSPITAL ENCOUNTER (OUTPATIENT)
Dept: GENERAL RADIOLOGY | Age: 49
Discharge: HOME | End: 2024-06-01
Payer: MEDICAID

## 2024-05-30 ENCOUNTER — HOSPITAL ENCOUNTER (OUTPATIENT)
Age: 49
Discharge: HOME | End: 2024-06-01
Payer: MEDICAID

## 2024-05-30 DIAGNOSIS — M25.561 ACUTE PAIN OF RIGHT KNEE: ICD-10-CM

## 2024-05-30 PROCEDURE — 73562 X-RAY EXAM OF KNEE 3: CPT

## 2024-05-31 ENCOUNTER — HOSPITAL ENCOUNTER (OUTPATIENT)
Dept: PHYSICAL THERAPY | Age: 49
Setting detail: THERAPIES SERIES
End: 2024-05-31
Payer: MEDICAID

## 2024-05-31 NOTE — PROGRESS NOTES
[facility]                Phone: [phone]  Fax: [phone]    Physical Therapy  Cancellation/No-show Note  Patient Name:  Samantha Anglin  :  1975   Date:  2024    For today's appointment patient:  []  Cancelled  []  Rescheduled appointment  [x]  No-show     Reason given by patient:  []  Patient ill  []  Conflicting appointment  []  No transportation    []  Conflict with work  [x]  No reason given  []  Other:     Comments:      Electronically signed by:  Isai Hooks, PT

## 2024-06-01 LAB
HPV SAMPLE: NORMAL
HPV SOURCE: NORMAL
HPV, GENOTYPE 16: NOT DETECTED
HPV, GENOTYPE 18: NOT DETECTED
HPV, HIGH RISK OTHER: NOT DETECTED
HPV, INTERPRETATION: NORMAL

## 2024-06-03 LAB — GYNECOLOGY CYTOLOGY REPORT: NORMAL

## 2024-06-17 ENCOUNTER — OFFICE VISIT (OUTPATIENT)
Dept: ORTHOPEDIC SURGERY | Age: 49
End: 2024-06-17
Payer: MEDICAID

## 2024-06-17 VITALS — HEIGHT: 60 IN | BODY MASS INDEX: 40.44 KG/M2 | WEIGHT: 206 LBS

## 2024-06-17 DIAGNOSIS — G89.29 CHRONIC PAIN OF RIGHT ANKLE: ICD-10-CM

## 2024-06-17 DIAGNOSIS — G89.29 CHRONIC PAIN OF RIGHT KNEE: Primary | ICD-10-CM

## 2024-06-17 DIAGNOSIS — M25.571 CHRONIC PAIN OF RIGHT ANKLE: ICD-10-CM

## 2024-06-17 DIAGNOSIS — M25.572 CHRONIC PAIN OF LEFT ANKLE: ICD-10-CM

## 2024-06-17 DIAGNOSIS — G89.29 CHRONIC PAIN OF LEFT ANKLE: ICD-10-CM

## 2024-06-17 DIAGNOSIS — M17.11 PRIMARY OSTEOARTHRITIS OF RIGHT KNEE: ICD-10-CM

## 2024-06-17 DIAGNOSIS — M25.561 CHRONIC PAIN OF RIGHT KNEE: Primary | ICD-10-CM

## 2024-06-17 PROCEDURE — 99203 OFFICE O/P NEW LOW 30 MIN: CPT | Performed by: FAMILY MEDICINE

## 2024-06-17 PROCEDURE — G8427 DOCREV CUR MEDS BY ELIG CLIN: HCPCS | Performed by: FAMILY MEDICINE

## 2024-06-17 PROCEDURE — 3017F COLORECTAL CA SCREEN DOC REV: CPT | Performed by: FAMILY MEDICINE

## 2024-06-17 PROCEDURE — 1036F TOBACCO NON-USER: CPT | Performed by: FAMILY MEDICINE

## 2024-06-17 PROCEDURE — G8417 CALC BMI ABV UP PARAM F/U: HCPCS | Performed by: FAMILY MEDICINE

## 2024-06-17 NOTE — PROGRESS NOTES
Ankle:  No swelling or ecchymosis noted.  TTP: ( - ) Lateral Ligaments, ( - ) Medial Ligaments, ( - ) Anterior Joint Line, ( - ) Achilles, ( - ) Lateral Malleolus, ( - ) Medial Malleolus, ( - ) 5th Metatarsal, ( - ) Plantar fascia orgin  Special tests: ( - ) Anterior drawer, ( - ) Talar Tilt, ( - ) Calcaneal Squeeze, ( - ) Krishnan, ( - ) Metatarsal Squeeze, ( - ) Eversion Stress, ( - ) Tibial Squeeze, ( - ) Lisfranc Stress   ______________________________________________________________________    Assessment & Plan :    1. Chronic pain of right knee  2. Primary osteoarthritis of right knee  Patient presents to the office today for evaluation of right knee pain.  History, referring provider note, physical exam and imaging (as interpreted by me) are consistent with mild right knee osteoarthritis.  Treatment options discussed with patient in the office today including activity modification, oral anti-inflammatories, physical therapy, injection options, advanced imaging the form of a MRI and referral to an orthopedic surgeon for discussion of surgical opinion. Patient wishes to continue with conservative treatment in the form of OTC medication and activity modification.  She will contact our office should her symptoms worsen or fail to improve and she wishes to proceed with corticosteroid injections.  Patient is agreeable with above plan all questions and concerns were addressed in the office today.     3. Chronic pain of left ankle  4. Chronic pain of right ankle  Patient presents to the office today for evaluation of bilateral ankle pain.  History, referring provider note, physical exam and imaging (as interpreted by me) are consistent with no acute bony abnormalities.  Treatment options discussed with patient in the office today including activity modification, oral anti-inflammatories, physical therapy, injection options, advanced imaging in the form of a MRI and referral to an orthopedic surgeon for discussion of

## 2024-07-11 ENCOUNTER — OFFICE VISIT (OUTPATIENT)
Dept: OBGYN | Age: 49
End: 2024-07-11
Payer: MEDICAID

## 2024-07-11 VITALS
DIASTOLIC BLOOD PRESSURE: 66 MMHG | BODY MASS INDEX: 40.44 KG/M2 | HEIGHT: 60 IN | HEART RATE: 113 BPM | WEIGHT: 206 LBS | SYSTOLIC BLOOD PRESSURE: 133 MMHG

## 2024-07-11 DIAGNOSIS — N95.0 POST-MENOPAUSAL BLEEDING: Primary | ICD-10-CM

## 2024-07-11 LAB
FOLLICLE STIMULATING HORMONE: 19.8 MIU/ML
LH: 11.1 MIU/ML

## 2024-07-11 PROCEDURE — G8417 CALC BMI ABV UP PARAM F/U: HCPCS | Performed by: MIDWIFE

## 2024-07-11 PROCEDURE — 3017F COLORECTAL CA SCREEN DOC REV: CPT | Performed by: MIDWIFE

## 2024-07-11 PROCEDURE — 1036F TOBACCO NON-USER: CPT | Performed by: MIDWIFE

## 2024-07-11 PROCEDURE — 36415 COLL VENOUS BLD VENIPUNCTURE: CPT | Performed by: MIDWIFE

## 2024-07-11 PROCEDURE — 99212 OFFICE O/P EST SF 10 MIN: CPT | Performed by: MIDWIFE

## 2024-07-11 PROCEDURE — G8427 DOCREV CUR MEDS BY ELIG CLIN: HCPCS | Performed by: MIDWIFE

## 2024-07-11 NOTE — PROGRESS NOTES
Subjective:      Samantha Anglin is a 49 y.o.  female,  presents with a complaint of Other (Patient states she has \"FUAD\" and is concerned and feels self conscious /She does bath routinely. /Does have hot flashes that have increased over the past couple months  )        Current Complaints:Pt had not had period for 2 years, had period last month, having hot flashes and taking paxil and working. Pt states her mother told her she has body odor       Gynecologic History  No LMP recorded (lmp unknown). Patient is postmenopausal.  Contraception: OCP (estrogen/progesterone)  Exercise: walking  OB History          1    Para   0    Term   0            AB   1    Living             SAB        IAB        Ectopic        Molar        Multiple        Live Births                  Patient's medications, allergies, past medical, surgical, social and family histories were reviewed and updated as appropriate.    Review of Systems  Constitutional: negative  Eyes: negative  Ears, nose, mouth, throat, and face: negative  Respiratory: negative  Cardiovascular: negative  Gastrointestinal: negative  Genitourinary:see above  Integument/breast: negative  Hematologic/lymphatic: negative  Musculoskeletal:negative  Neurological: negative  Behavioral/Psych: negative  Endocrine: negative  Allergic/Immunologic: negative     Objective:       /66   Pulse (!) 113   Ht 1.524 m (5')   Wt 93.4 kg (206 lb)   LMP  (LMP Unknown)   BMI 40.23 kg/m²   General appearance: alert, appears stated age, and cooperative  Head: Normocephalic, without obvious abnormality, atraumatic  Eyes: No gross abnormalities.  Ears: normal  external ear canals both ears  Neck: no adenopathy, supple, symmetrical, trachea midline, and thyroid not enlarged, symmetric, no tenderness/mass/nodules  Heart: Normal S1 and S2.  Regular rhythm. No murmurs, gallops, or rubs.   Lungs: clear to auscultation bilaterally  Abdomen: soft, non-tender; bowel sounds

## 2024-07-11 NOTE — PROGRESS NOTES
Patient here today to discuss hot flashes and body odor  Blood work obtained labeled and sent to lab   Discharge instructions have been discussed with the patient. Patient advised to call our office with any questions or concerns.   Voiced understanding.

## 2024-11-08 ENCOUNTER — OFFICE VISIT (OUTPATIENT)
Dept: FAMILY MEDICINE CLINIC | Age: 49
End: 2024-11-08
Payer: MEDICAID

## 2024-11-08 VITALS
RESPIRATION RATE: 22 BRPM | OXYGEN SATURATION: 97 % | WEIGHT: 202.8 LBS | TEMPERATURE: 97.9 F | DIASTOLIC BLOOD PRESSURE: 80 MMHG | BODY MASS INDEX: 39.82 KG/M2 | SYSTOLIC BLOOD PRESSURE: 120 MMHG | HEART RATE: 83 BPM | HEIGHT: 60 IN

## 2024-11-08 DIAGNOSIS — J32.9 SINOBRONCHITIS: Primary | ICD-10-CM

## 2024-11-08 DIAGNOSIS — R11.0 NAUSEA: ICD-10-CM

## 2024-11-08 DIAGNOSIS — J40 SINOBRONCHITIS: Primary | ICD-10-CM

## 2024-11-08 PROCEDURE — G8427 DOCREV CUR MEDS BY ELIG CLIN: HCPCS

## 2024-11-08 PROCEDURE — G8484 FLU IMMUNIZE NO ADMIN: HCPCS

## 2024-11-08 PROCEDURE — 99213 OFFICE O/P EST LOW 20 MIN: CPT

## 2024-11-08 PROCEDURE — G8417 CALC BMI ABV UP PARAM F/U: HCPCS

## 2024-11-08 PROCEDURE — 3017F COLORECTAL CA SCREEN DOC REV: CPT

## 2024-11-08 PROCEDURE — 1036F TOBACCO NON-USER: CPT

## 2024-11-08 RX ORDER — ONDANSETRON 4 MG/1
4 TABLET, ORALLY DISINTEGRATING ORAL EVERY 8 HOURS PRN
Qty: 21 TABLET | Refills: 0 | Status: SHIPPED | OUTPATIENT
Start: 2024-11-08

## 2024-11-08 RX ORDER — METHYLPREDNISOLONE 4 MG/1
TABLET ORAL
Qty: 1 KIT | Refills: 0 | Status: SHIPPED | OUTPATIENT
Start: 2024-11-08

## 2024-11-08 NOTE — PROGRESS NOTES
would be informed prior to start of the visit. The patient (or guardian, if applicable) and other individuals in attendance at the appointment consented to the use of AI if was utilized, including the recording.  Every effort was made to ensure accuracy; however, inadvertent computerized transcription errors may be present.

## 2024-11-21 ENCOUNTER — OFFICE VISIT (OUTPATIENT)
Dept: FAMILY MEDICINE CLINIC | Age: 49
End: 2024-11-21
Payer: MEDICAID

## 2024-11-21 VITALS
DIASTOLIC BLOOD PRESSURE: 80 MMHG | WEIGHT: 202.4 LBS | HEIGHT: 60 IN | TEMPERATURE: 97.2 F | HEART RATE: 104 BPM | SYSTOLIC BLOOD PRESSURE: 122 MMHG | OXYGEN SATURATION: 98 % | BODY MASS INDEX: 39.74 KG/M2

## 2024-11-21 DIAGNOSIS — R73.9 HYPERGLYCEMIA: ICD-10-CM

## 2024-11-21 DIAGNOSIS — M17.11 OSTEOARTHRITIS OF RIGHT KNEE, UNSPECIFIED OSTEOARTHRITIS TYPE: Primary | ICD-10-CM

## 2024-11-21 DIAGNOSIS — G43.109 MIGRAINE WITH AURA AND WITHOUT STATUS MIGRAINOSUS, NOT INTRACTABLE: Chronic | ICD-10-CM

## 2024-11-21 DIAGNOSIS — F32.89 OTHER DEPRESSION: ICD-10-CM

## 2024-11-21 DIAGNOSIS — E55.9 VITAMIN D DEFICIENCY: ICD-10-CM

## 2024-11-21 DIAGNOSIS — E78.5 HYPERLIPIDEMIA, UNSPECIFIED HYPERLIPIDEMIA TYPE: ICD-10-CM

## 2024-11-21 PROBLEM — Z80.0 FAMILY HISTORY OF COLON CANCER: Status: RESOLVED | Noted: 2020-10-07 | Resolved: 2024-11-21

## 2024-11-21 PROBLEM — Z80.3 FAMILY HISTORY OF BREAST CANCER: Status: RESOLVED | Noted: 2020-10-07 | Resolved: 2024-11-21

## 2024-11-21 LAB
ALBUMIN: 4.3 G/DL (ref 3.5–5.2)
ALP BLD-CCNC: 95 U/L (ref 35–104)
ALT SERPL-CCNC: 9 U/L (ref 0–32)
ANION GAP SERPL CALCULATED.3IONS-SCNC: 16 MMOL/L (ref 7–16)
AST SERPL-CCNC: 13 U/L (ref 0–31)
BASOPHILS ABSOLUTE: 0.08 K/UL (ref 0–0.2)
BASOPHILS RELATIVE PERCENT: 1 % (ref 0–2)
BILIRUB SERPL-MCNC: 0.2 MG/DL (ref 0–1.2)
BUN BLDV-MCNC: 6 MG/DL (ref 6–20)
CALCIUM SERPL-MCNC: 9.6 MG/DL (ref 8.6–10.2)
CHLORIDE BLD-SCNC: 104 MMOL/L (ref 98–107)
CHOLESTEROL, TOTAL: 174 MG/DL
CO2: 21 MMOL/L (ref 22–29)
CREAT SERPL-MCNC: 0.9 MG/DL (ref 0.5–1)
EOSINOPHILS ABSOLUTE: 0.18 K/UL (ref 0.05–0.5)
EOSINOPHILS RELATIVE PERCENT: 3 % (ref 0–6)
GFR, ESTIMATED: 80 ML/MIN/1.73M2
GLUCOSE BLD-MCNC: 95 MG/DL (ref 74–99)
HBA1C MFR BLD: 5.6 % (ref 4–5.6)
HCT VFR BLD CALC: 36.1 % (ref 34–48)
HDLC SERPL-MCNC: 44 MG/DL
HEMOGLOBIN: 11 G/DL (ref 11.5–15.5)
IMMATURE GRANULOCYTES %: 0 % (ref 0–5)
IMMATURE GRANULOCYTES ABSOLUTE: 0.03 K/UL (ref 0–0.58)
LDL CHOLESTEROL: 119 MG/DL
LYMPHOCYTES ABSOLUTE: 1.9 K/UL (ref 1.5–4)
LYMPHOCYTES RELATIVE PERCENT: 28 % (ref 20–42)
MAGNESIUM: 2.3 MG/DL (ref 1.6–2.6)
MCH RBC QN AUTO: 24 PG (ref 26–35)
MCHC RBC AUTO-ENTMCNC: 30.5 G/DL (ref 32–34.5)
MCV RBC AUTO: 78.8 FL (ref 80–99.9)
MONOCYTES ABSOLUTE: 0.5 K/UL (ref 0.1–0.95)
MONOCYTES RELATIVE PERCENT: 7 % (ref 2–12)
NEUTROPHILS ABSOLUTE: 4.1 K/UL (ref 1.8–7.3)
NEUTROPHILS RELATIVE PERCENT: 60 % (ref 43–80)
PDW BLD-RTO: 15.9 % (ref 11.5–15)
PLATELET # BLD: 395 K/UL (ref 130–450)
PMV BLD AUTO: 10.2 FL (ref 7–12)
POTASSIUM SERPL-SCNC: 3.2 MMOL/L (ref 3.5–5)
RBC # BLD: 4.58 M/UL (ref 3.5–5.5)
SODIUM BLD-SCNC: 141 MMOL/L (ref 132–146)
TOTAL PROTEIN: 7.4 G/DL (ref 6.4–8.3)
TRIGL SERPL-MCNC: 57 MG/DL
TSH SERPL DL<=0.05 MIU/L-ACNC: 1.54 UIU/ML (ref 0.27–4.2)
VITAMIN D 25-HYDROXY: 24.8 NG/ML (ref 30–100)
VLDLC SERPL CALC-MCNC: 11 MG/DL
WBC # BLD: 6.8 K/UL (ref 4.5–11.5)

## 2024-11-21 PROCEDURE — G8484 FLU IMMUNIZE NO ADMIN: HCPCS | Performed by: STUDENT IN AN ORGANIZED HEALTH CARE EDUCATION/TRAINING PROGRAM

## 2024-11-21 PROCEDURE — 36415 COLL VENOUS BLD VENIPUNCTURE: CPT | Performed by: STUDENT IN AN ORGANIZED HEALTH CARE EDUCATION/TRAINING PROGRAM

## 2024-11-21 PROCEDURE — 3017F COLORECTAL CA SCREEN DOC REV: CPT | Performed by: STUDENT IN AN ORGANIZED HEALTH CARE EDUCATION/TRAINING PROGRAM

## 2024-11-21 PROCEDURE — 1036F TOBACCO NON-USER: CPT | Performed by: STUDENT IN AN ORGANIZED HEALTH CARE EDUCATION/TRAINING PROGRAM

## 2024-11-21 PROCEDURE — 99214 OFFICE O/P EST MOD 30 MIN: CPT | Performed by: STUDENT IN AN ORGANIZED HEALTH CARE EDUCATION/TRAINING PROGRAM

## 2024-11-21 PROCEDURE — G8417 CALC BMI ABV UP PARAM F/U: HCPCS | Performed by: STUDENT IN AN ORGANIZED HEALTH CARE EDUCATION/TRAINING PROGRAM

## 2024-11-21 PROCEDURE — G8427 DOCREV CUR MEDS BY ELIG CLIN: HCPCS | Performed by: STUDENT IN AN ORGANIZED HEALTH CARE EDUCATION/TRAINING PROGRAM

## 2024-11-21 RX ORDER — DROSPIRENONE 4 MG/1
1 TABLET, FILM COATED ORAL DAILY
Qty: 28 TABLET | Refills: 5 | Status: SHIPPED | OUTPATIENT
Start: 2024-11-21

## 2024-11-21 RX ORDER — MELOXICAM 7.5 MG/1
7.5 TABLET ORAL DAILY
Qty: 30 TABLET | Refills: 3 | Status: SHIPPED | OUTPATIENT
Start: 2024-11-21

## 2024-11-21 RX ORDER — AMITRIPTYLINE HYDROCHLORIDE 150 MG/1
150 TABLET ORAL NIGHTLY
Qty: 90 TABLET | Refills: 3 | Status: SHIPPED | OUTPATIENT
Start: 2024-11-21

## 2024-11-21 ASSESSMENT — ENCOUNTER SYMPTOMS
COUGH: 0
ABDOMINAL PAIN: 0
SORE THROAT: 0
EYE PAIN: 0
SINUS PAIN: 0
SHORTNESS OF BREATH: 0
WHEEZING: 0
NAUSEA: 0
VOMITING: 0

## 2024-11-21 NOTE — PROGRESS NOTES
[facility]      Department of Family Medicine  Phone: [phone]   Fax: [phone]    11/20/24    Samantha Anglin is a 49 y.o. 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; was working for [employer] at [employer]  Children/grandchildren: none   Pets: none  Hobbies: walk, play [employer] games, read   Alcohol: denies   Drugs: denies   Tobacco: denies   Vape: denies     Medical History:  Migraines: Elavil 150 mg nightly, Imitrex 100 mg prn; controlled   Chronic sinusitis: just finished up therapy after seen in walk in; claritin daily  Eczema: controlled, hydrocortisone cream prn  Depression: Elavil 150 mg nightly  OA right knee: worsening (See below)  Menopausal symptoms:    Health Maintenance:  Vaccines: utd  Colonoscopy: cologuard negative 2/2023  Labs: due   Pap: 5/2024  DEXA/Mammogram: March 2024  Lung CT: n/a     Concerns:    R knee pain  Worsening over last 2 months  Trouble getting down and coming back up   X ray in May 2024 did show mild OA  Sometimes will need to take half day and elevate  Ibuprofen helps   Did also see Sports med in June and declined injections at that time  Did go through PT at that time       Allergies   Allergen Reactions    Latex Anaphylaxis, Hives and Rash    Bee Venom Anaphylaxis     Spiders, snakes. Hornets, wasps,     Iodine Hives    Trazodone And Nefazodone Nausea And Vomiting    Bactrim Hives    Codeine     Iodides Hives    Aspirin Hives and Nausea And Vomiting    Tylenol With Codeine [Acetaminophen-Codeine] Nausea And Vomiting    Ultram [Tramadol Hcl] Nausea And

## 2024-11-22 DIAGNOSIS — D50.0 IRON DEFICIENCY ANEMIA DUE TO CHRONIC BLOOD LOSS: ICD-10-CM

## 2024-11-22 DIAGNOSIS — E87.6 HYPOKALEMIA: Primary | ICD-10-CM

## 2024-11-22 RX ORDER — POTASSIUM CHLORIDE 1500 MG/1
20 TABLET, EXTENDED RELEASE ORAL DAILY
Qty: 30 TABLET | Refills: 0 | Status: SHIPPED | OUTPATIENT
Start: 2024-11-22

## 2024-11-22 RX ORDER — FERROUS SULFATE 325(65) MG
325 TABLET ORAL
Qty: 90 TABLET | Refills: 0 | Status: SHIPPED | OUTPATIENT
Start: 2024-11-22

## 2024-11-22 NOTE — RESULT ENCOUNTER NOTE
Vitamin D is slightly low.  Recommend supplementation with 2000 units daily    Potassium is low.  Raimundo order supplementation to be taken until Monday and then labs to be rechecked.    The LDL cholesterol is very slightly elevated but otherwise panel is good and overall risk remains low.  The 10-year ASCVD risk score (Clementina MUSE, et al., 2019) is: 1.2%    Values used to calculate the score:      Age: 49 years      Sex: Female      Is Non- : No      Diabetic: No      Tobacco smoker: No      Systolic Blood Pressure: 122 mmHg      Is BP treated: No      HDL Cholesterol: 44 mg/dL      Total Cholesterol: 174 mg/dL      Blood count has been low over the past few years.  I do recommend iron supplementation at this time.  I know that she has seen gynecology in the past for postmenopausal bleeding.  I do recommend following up with them because in their last note they did talk about potential biopsy as this is likely the cause of the drop in blood count.    Otherwise labs are normal.

## 2024-12-02 ENCOUNTER — TELEPHONE (OUTPATIENT)
Dept: OBGYN | Age: 49
End: 2024-12-02

## 2024-12-02 DIAGNOSIS — G43.109 MIGRAINE WITH AURA AND WITHOUT STATUS MIGRAINOSUS, NOT INTRACTABLE: Primary | ICD-10-CM

## 2024-12-02 RX ORDER — RIMEGEPANT SULFATE 75 MG/75MG
75 TABLET, ORALLY DISINTEGRATING ORAL EVERY OTHER DAY
Qty: 30 TABLET | Refills: 0 | Status: SHIPPED | OUTPATIENT
Start: 2024-12-02

## 2024-12-02 NOTE — TELEPHONE ENCOUNTER
Pt phnd states she took herself off of the Imitrex 1 month ago - would like placed on Nurtec.  Pt wants to know if she can get this RX & if it will interfere with her Paxil.  Please call pt to advise [phone]

## 2024-12-03 NOTE — TELEPHONE ENCOUNTER
Called patient but no answer.  Detailed message left on voicemail advising her to contact her PCP.

## 2025-03-07 ENCOUNTER — OFFICE VISIT (OUTPATIENT)
Dept: FAMILY MEDICINE CLINIC | Age: 50
End: 2025-03-07
Payer: MEDICAID

## 2025-03-07 VITALS
WEIGHT: 201.4 LBS | DIASTOLIC BLOOD PRESSURE: 70 MMHG | OXYGEN SATURATION: 96 % | HEIGHT: 60 IN | HEART RATE: 96 BPM | SYSTOLIC BLOOD PRESSURE: 100 MMHG | RESPIRATION RATE: 17 BRPM | TEMPERATURE: 97.7 F | BODY MASS INDEX: 39.54 KG/M2

## 2025-03-07 DIAGNOSIS — R11.0 NAUSEA: ICD-10-CM

## 2025-03-07 DIAGNOSIS — J01.90 ACUTE SINUSITIS, RECURRENCE NOT SPECIFIED, UNSPECIFIED LOCATION: Primary | ICD-10-CM

## 2025-03-07 PROCEDURE — 99213 OFFICE O/P EST LOW 20 MIN: CPT

## 2025-03-07 PROCEDURE — 1036F TOBACCO NON-USER: CPT

## 2025-03-07 PROCEDURE — 3017F COLORECTAL CA SCREEN DOC REV: CPT

## 2025-03-07 PROCEDURE — G8417 CALC BMI ABV UP PARAM F/U: HCPCS

## 2025-03-07 PROCEDURE — G8427 DOCREV CUR MEDS BY ELIG CLIN: HCPCS

## 2025-03-07 RX ORDER — ONDANSETRON 4 MG/1
4 TABLET, ORALLY DISINTEGRATING ORAL EVERY 8 HOURS PRN
Qty: 21 TABLET | Refills: 0 | Status: SHIPPED | OUTPATIENT
Start: 2025-03-07

## 2025-03-07 RX ORDER — METHYLPREDNISOLONE 4 MG/1
TABLET ORAL
Qty: 1 KIT | Refills: 0 | Status: SHIPPED | OUTPATIENT
Start: 2025-03-07

## 2025-03-07 NOTE — PROGRESS NOTES
record and process the conversation to generate a clinical note they would be informed prior to start of the visit. The patient (or guardian, if applicable) and other individuals in attendance at the appointment consented to the use of AI if was utilized, including the recording.  Every effort was made to ensure accuracy; however, inadvertent computerized transcription errors may be present.

## 2025-03-26 ENCOUNTER — TELEPHONE (OUTPATIENT)
Dept: ADMINISTRATIVE | Age: 50
End: 2025-03-26

## 2025-03-26 NOTE — TELEPHONE ENCOUNTER
Alivia Mcconnell is calling to get a Mammogram order faxed over to [phone].    She has an appt on 3/31.

## 2025-03-26 NOTE — TELEPHONE ENCOUNTER
Dr. Crowell's patient needs a mammogram order sent to the [facility]. She has an appointment on 03/31/2025 to have it completed

## 2025-03-27 DIAGNOSIS — Z12.31 ENCOUNTER FOR SCREENING MAMMOGRAM FOR MALIGNANT NEOPLASM OF BREAST: Primary | ICD-10-CM

## 2025-04-22 ENCOUNTER — OFFICE VISIT (OUTPATIENT)
Dept: FAMILY MEDICINE CLINIC | Age: 50
End: 2025-04-22
Payer: MEDICAID

## 2025-04-22 VITALS
WEIGHT: 201 LBS | TEMPERATURE: 97.9 F | DIASTOLIC BLOOD PRESSURE: 68 MMHG | HEART RATE: 100 BPM | BODY MASS INDEX: 39.26 KG/M2 | OXYGEN SATURATION: 97 % | SYSTOLIC BLOOD PRESSURE: 130 MMHG

## 2025-04-22 DIAGNOSIS — R10.84 GENERALIZED ABDOMINAL PAIN: ICD-10-CM

## 2025-04-22 DIAGNOSIS — K59.00 CONSTIPATION, UNSPECIFIED CONSTIPATION TYPE: Primary | ICD-10-CM

## 2025-04-22 PROCEDURE — G8427 DOCREV CUR MEDS BY ELIG CLIN: HCPCS | Performed by: PHYSICIAN ASSISTANT

## 2025-04-22 PROCEDURE — 3017F COLORECTAL CA SCREEN DOC REV: CPT | Performed by: PHYSICIAN ASSISTANT

## 2025-04-22 PROCEDURE — G8417 CALC BMI ABV UP PARAM F/U: HCPCS | Performed by: PHYSICIAN ASSISTANT

## 2025-04-22 PROCEDURE — 99213 OFFICE O/P EST LOW 20 MIN: CPT | Performed by: PHYSICIAN ASSISTANT

## 2025-04-22 PROCEDURE — 1036F TOBACCO NON-USER: CPT | Performed by: PHYSICIAN ASSISTANT

## 2025-04-22 SDOH — ECONOMIC STABILITY: FOOD INSECURITY: WITHIN THE PAST 12 MONTHS, YOU WORRIED THAT YOUR FOOD WOULD RUN OUT BEFORE YOU GOT MONEY TO BUY MORE.: NEVER TRUE

## 2025-04-22 SDOH — ECONOMIC STABILITY: FOOD INSECURITY: WITHIN THE PAST 12 MONTHS, THE FOOD YOU BOUGHT JUST DIDN'T LAST AND YOU DIDN'T HAVE MONEY TO GET MORE.: NEVER TRUE

## 2025-04-22 ASSESSMENT — PATIENT HEALTH QUESTIONNAIRE - PHQ9
8. MOVING OR SPEAKING SO SLOWLY THAT OTHER PEOPLE COULD HAVE NOTICED. OR THE OPPOSITE, BEING SO FIGETY OR RESTLESS THAT YOU HAVE BEEN MOVING AROUND A LOT MORE THAN USUAL: NOT AT ALL
3. TROUBLE FALLING OR STAYING ASLEEP: NOT AT ALL
9. THOUGHTS THAT YOU WOULD BE BETTER OFF DEAD, OR OF HURTING YOURSELF: NOT AT ALL
2. FEELING DOWN, DEPRESSED OR HOPELESS: NOT AT ALL
1. LITTLE INTEREST OR PLEASURE IN DOING THINGS: NOT AT ALL
6. FEELING BAD ABOUT YOURSELF - OR THAT YOU ARE A FAILURE OR HAVE LET YOURSELF OR YOUR FAMILY DOWN: NOT AT ALL
SUM OF ALL RESPONSES TO PHQ QUESTIONS 1-9: 0
SUM OF ALL RESPONSES TO PHQ QUESTIONS 1-9: 0
5. POOR APPETITE OR OVEREATING: NOT AT ALL
SUM OF ALL RESPONSES TO PHQ QUESTIONS 1-9: 0
7. TROUBLE CONCENTRATING ON THINGS, SUCH AS READING THE NEWSPAPER OR WATCHING TELEVISION: NOT AT ALL
4. FEELING TIRED OR HAVING LITTLE ENERGY: NOT AT ALL
10. IF YOU CHECKED OFF ANY PROBLEMS, HOW DIFFICULT HAVE THESE PROBLEMS MADE IT FOR YOU TO DO YOUR WORK, TAKE CARE OF THINGS AT HOME, OR GET ALONG WITH OTHER PEOPLE: NOT DIFFICULT AT ALL
SUM OF ALL RESPONSES TO PHQ QUESTIONS 1-9: 0

## 2025-04-22 NOTE — PROGRESS NOTES
Chief Complaint:       Constipation (Has been constipated for two weeks)    History of Present Illness   Source of history provided by:  patient.      Samantha Anglin is a 50 y.o. old female who has a past medical history of:   Past Medical History:   Diagnosis Date    Degenerative joint disease of low back 02/11/2021    Depression 10/31/2023    Family history of breast cancer 10/07/2020    Family history of colon cancer 10/07/2020    Migraine 20 years    Sinusitis (chronic)     presents to the [facility] care for complaints of generalized abdominal pain and constipation. States that last BM was 2 weeks ago. Describes the pain as constant, worse with position changes. States the pain does not radiate. Reports associated bloating, but denies any nausea, vomiting or diarrhea. Has tried taking multiple OTC medications without symptomatic relief- ex-lax, stool softener, juices. Denies any fever, chills, CP, SOB, dysuria, hematuria, change in stool color/consistency, HA, sore throat, rash, or lethargy.      ROS    Unless otherwise stated in this report or unable to obtain because of the patient's clinical or mental status as evidenced by the medical record, this patients's positive and negative responses for Review of Systems, constitutional, psych, eyes, ENT, cardiovascular, respiratory, gastrointestinal, neurological, genitourinary, musculoskeletal, integument systems and systems related to the presenting problem are either stated in the preceding or were not pertinent or were negative for the symptoms and/or complaints related to the medical problem.    Past Medical History:   Past Surgical History:   Procedure Laterality Date    ADENOIDECTOMY      BREAST BIOPSY  approx 2020    Patient not sure which site    CHOLECYSTECTOMY  age 23    TONSILLECTOMY  age 2    WISDOM TOOTH EXTRACTION       Social History:  reports that she has never smoked. She has been exposed to tobacco smoke. She has never used smokeless tobacco. She

## 2025-06-02 ENCOUNTER — HOSPITAL ENCOUNTER (OUTPATIENT)
Dept: GENERAL RADIOLOGY | Age: 50
Discharge: HOME | End: 2025-06-04
Attending: STUDENT IN AN ORGANIZED HEALTH CARE EDUCATION/TRAINING PROGRAM
Payer: MEDICAID

## 2025-06-02 VITALS — BODY MASS INDEX: 39.06 KG/M2 | WEIGHT: 200 LBS

## 2025-06-02 DIAGNOSIS — Z12.31 ENCOUNTER FOR SCREENING MAMMOGRAM FOR MALIGNANT NEOPLASM OF BREAST: ICD-10-CM

## 2025-06-02 PROCEDURE — 77067 SCR MAMMO BI INCL CAD: CPT

## 2025-06-16 ENCOUNTER — TELEPHONE (OUTPATIENT)
Dept: OBGYN | Age: 50
End: 2025-06-16

## 2025-06-23 RX ORDER — DROSPIRENONE 4 MG/1
1 TABLET, FILM COATED ORAL DAILY
Qty: 28 TABLET | Refills: 0 | Status: SHIPPED | OUTPATIENT
Start: 2025-06-23

## 2025-06-30 ENCOUNTER — RESULTS FOLLOW-UP (OUTPATIENT)
Dept: FAMILY MEDICINE CLINIC | Age: 50
End: 2025-06-30

## 2025-06-30 ENCOUNTER — OFFICE VISIT (OUTPATIENT)
Dept: FAMILY MEDICINE CLINIC | Age: 50
End: 2025-06-30
Payer: MEDICAID

## 2025-06-30 VITALS
TEMPERATURE: 97.1 F | HEART RATE: 100 BPM | BODY MASS INDEX: 38.67 KG/M2 | SYSTOLIC BLOOD PRESSURE: 128 MMHG | OXYGEN SATURATION: 97 % | DIASTOLIC BLOOD PRESSURE: 64 MMHG | WEIGHT: 198 LBS

## 2025-06-30 DIAGNOSIS — E87.6 HYPOKALEMIA: ICD-10-CM

## 2025-06-30 DIAGNOSIS — R11.0 NAUSEA: ICD-10-CM

## 2025-06-30 DIAGNOSIS — G43.109 MIGRAINE WITH AURA AND WITHOUT STATUS MIGRAINOSUS, NOT INTRACTABLE: Primary | ICD-10-CM

## 2025-06-30 DIAGNOSIS — D64.9 ANEMIA, UNSPECIFIED TYPE: ICD-10-CM

## 2025-06-30 DIAGNOSIS — M17.11 PRIMARY OSTEOARTHRITIS OF RIGHT KNEE: ICD-10-CM

## 2025-06-30 DIAGNOSIS — J32.8 OTHER CHRONIC SINUSITIS: ICD-10-CM

## 2025-06-30 LAB
ALBUMIN: 4.1 G/DL (ref 3.5–5.2)
ALP BLD-CCNC: 94 U/L (ref 35–104)
ALT SERPL-CCNC: 15 U/L (ref 0–35)
ANION GAP SERPL CALCULATED.3IONS-SCNC: 14 MMOL/L (ref 7–16)
AST SERPL-CCNC: 26 U/L (ref 0–35)
BILIRUB SERPL-MCNC: <0.2 MG/DL (ref 0–1.2)
BUN BLDV-MCNC: 9 MG/DL (ref 6–20)
CALCIUM SERPL-MCNC: 9.4 MG/DL (ref 8.6–10)
CHLORIDE BLD-SCNC: 106 MMOL/L (ref 98–107)
CO2: 19 MMOL/L (ref 22–29)
CREAT SERPL-MCNC: 1 MG/DL (ref 0.5–1)
GFR, ESTIMATED: 72 ML/MIN/1.73M2
GLUCOSE BLD-MCNC: 89 MG/DL (ref 74–99)
HCT VFR BLD CALC: 35 % (ref 34–48)
HEMOGLOBIN: 10.7 G/DL (ref 11.5–15.5)
MCH RBC QN AUTO: 22.9 PG (ref 26–35)
MCHC RBC AUTO-ENTMCNC: 30.6 G/DL (ref 32–34.5)
MCV RBC AUTO: 74.8 FL (ref 80–99.9)
PDW BLD-RTO: 16.3 % (ref 11.5–15)
PLATELET # BLD: 385 K/UL (ref 130–450)
PMV BLD AUTO: 9.9 FL (ref 7–12)
POTASSIUM SERPL-SCNC: 3.8 MMOL/L (ref 3.5–5.1)
RBC # BLD: 4.68 M/UL (ref 3.5–5.5)
SODIUM BLD-SCNC: 139 MMOL/L (ref 136–145)
TOTAL PROTEIN: 7.3 G/DL (ref 6.4–8.3)
WBC # BLD: 8.1 K/UL (ref 4.5–11.5)

## 2025-06-30 PROCEDURE — 1036F TOBACCO NON-USER: CPT | Performed by: STUDENT IN AN ORGANIZED HEALTH CARE EDUCATION/TRAINING PROGRAM

## 2025-06-30 PROCEDURE — 99214 OFFICE O/P EST MOD 30 MIN: CPT | Performed by: STUDENT IN AN ORGANIZED HEALTH CARE EDUCATION/TRAINING PROGRAM

## 2025-06-30 PROCEDURE — G8427 DOCREV CUR MEDS BY ELIG CLIN: HCPCS | Performed by: STUDENT IN AN ORGANIZED HEALTH CARE EDUCATION/TRAINING PROGRAM

## 2025-06-30 PROCEDURE — G8417 CALC BMI ABV UP PARAM F/U: HCPCS | Performed by: STUDENT IN AN ORGANIZED HEALTH CARE EDUCATION/TRAINING PROGRAM

## 2025-06-30 PROCEDURE — 36415 COLL VENOUS BLD VENIPUNCTURE: CPT | Performed by: STUDENT IN AN ORGANIZED HEALTH CARE EDUCATION/TRAINING PROGRAM

## 2025-06-30 PROCEDURE — 3017F COLORECTAL CA SCREEN DOC REV: CPT | Performed by: STUDENT IN AN ORGANIZED HEALTH CARE EDUCATION/TRAINING PROGRAM

## 2025-06-30 RX ORDER — RIMEGEPANT SULFATE 75 MG/75MG
75 TABLET, ORALLY DISINTEGRATING ORAL EVERY OTHER DAY
Qty: 30 TABLET | Refills: 5 | Status: SHIPPED | OUTPATIENT
Start: 2025-06-30

## 2025-06-30 RX ORDER — LORATADINE 10 MG/1
10 TABLET ORAL DAILY
Qty: 90 TABLET | Refills: 3 | Status: SHIPPED | OUTPATIENT
Start: 2025-06-30

## 2025-06-30 RX ORDER — MELOXICAM 7.5 MG/1
7.5 TABLET ORAL DAILY
Qty: 30 TABLET | Refills: 3 | Status: SHIPPED | OUTPATIENT
Start: 2025-06-30

## 2025-06-30 RX ORDER — ONDANSETRON 4 MG/1
4 TABLET, ORALLY DISINTEGRATING ORAL EVERY 8 HOURS PRN
Qty: 21 TABLET | Refills: 0 | Status: SHIPPED | OUTPATIENT
Start: 2025-06-30

## 2025-06-30 NOTE — PROGRESS NOTES
rash in bilateral dorsal side of hands.   Neurological:      Mental Status: She is alert.   Psychiatric:         Mood and Affect: Mood normal.         Behavior: Behavior normal.           Assessment and Plan       1. Migraine with aura and without status migrainosus, not intractable  Comments:  Stable.  Continue current regimen of amitriptyline 150 mg nightly and Nurtec 75 mg  Orders:  -     rimegepant sulfate (NURTEC) 75 MG TBDP; Take 1 tablet by mouth every other day, Disp-30 tablet, R-5Normal  -     Comprehensive Metabolic Panel  2. Other chronic sinusitis  Comments:  Continue current regiment of Claritin 10 mg daily  Orders:  -     loratadine (CLARITIN) 10 MG tablet; Take 1 tablet by mouth daily, Disp-90 tablet, R-3Normal  3. Primary osteoarthritis of right knee  Comments:  Stable.  Pain tolerated with oral Mobic 7.5 mg daily  Orders:  -     meloxicam (MOBIC) 7.5 MG tablet; Take 1 tablet by mouth daily, Disp-30 tablet, R-3Normal  4. Hypokalemia  Comments:  Not at goal.  Has discontinued supplemental potassium.  Currently taking OTC multivitamin.  Repeat CMP.  Orders:  -     CBC  -     Comprehensive Metabolic Panel  5. Anemia, unspecified type  Comments:  Not at goal.  Continue to monitor.  Repeat CBC  Orders:  -     CBC  6. Nausea  -     ondansetron (ZOFRAN-ODT) 4 MG disintegrating tablet; Take 1 tablet by mouth every 8 hours as needed for Nausea or Vomiting, Disp-21 tablet, R-0Normal           Return in about 6 months (around 12/30/2025).    This note may have been created using dictation software. Efforts were made to reduce grammatical or syntax errors, but some may persist.     Counseled regarding above diagnosis, including possible risks and complications, especially if left uncontrolled. Counseled regarding the possible side effects, risks, benefits and alternatives to treatment; patient and/or guardian verbalizes understanding, agrees, feels comfortable with, and wishes to proceed with above treatment

## 2025-08-01 DIAGNOSIS — F32.89 OTHER DEPRESSION: ICD-10-CM

## 2025-08-01 DIAGNOSIS — R23.2 HOT FLASHES: ICD-10-CM

## 2025-08-01 RX ORDER — PAROXETINE 10 MG/1
10 TABLET, FILM COATED ORAL DAILY
Qty: 90 TABLET | Refills: 0 | OUTPATIENT
Start: 2025-08-01

## 2025-08-01 RX ORDER — DROSPIRENONE 4 MG/1
1 TABLET, FILM COATED ORAL DAILY
Qty: 28 TABLET | Refills: 5 | Status: SHIPPED | OUTPATIENT
Start: 2025-08-01

## 2025-08-01 NOTE — TELEPHONE ENCOUNTER
Name of Medication(s) Requested:  Requested Prescriptions      No prescriptions requested or ordered in this encounter       Medication is on current medication list Yes    Dosage and directions were verified? Yes    Quantity verified: 90 day supply     Pharmacy Verified?  Yes    Last Appointment:  11/21/2024    Future appts:  No future appointments.     (If no appt send self scheduling link. .REFILLAPPT)  Scheduling request sent?     [] Yes  [] No    Does patient need updated?  [] Yes  [] No

## 2025-08-01 NOTE — PROGRESS NOTES
Received refill request for Paxil prescription. Patient missed her annual appointment yesterday. Called pharmacy because the existing prescription still had refills but it was past the expiration date so she was not able to refill them. Gave verbal order to [facility]:    Order:      PARoxetine (PAXIL) 10 MG tablet [[id number]]    Order Details  Dose: 10 mg Route: Oral Frequency: DAILY   Dispense Quantity: 90 tablet Refills: 0          Sig: Take 1 tablet by mouth daily         Start Date: 08/01/25 End Date: --   Written Date: 08/01/25 Expiration Date: 08/01/26   Medication Notes:   Patricia Wall LPN  -- 8/1/2025 12:14   >> PATRICIA WALL   Fri Aug 1, 2025 12:13 PM   Patient will have to make an appointment with her provider to receive further refills.       Associated Diagnoses: Hot flashes [R23.2]; Other depression [F32.89]   Original Order: PARoxetine (PAXIL) 10 MG tablet [[id number]]   Providers    Authorizing Provider: Janay Shannon APRN - CNM NPI: [id number]   Ordering User: Patricia Wall LPN    Cosigner: Janay Shannon APRN - CNM Signed: --          Patient notified

## 2025-08-05 RX ORDER — PAROXETINE 10 MG/1
10 TABLET, FILM COATED ORAL DAILY
Qty: 90 TABLET | Refills: 0 | Status: SHIPPED | OUTPATIENT
Start: 2025-08-05